# Patient Record
Sex: MALE | Race: WHITE | Employment: OTHER | URBAN - NONMETROPOLITAN AREA
[De-identification: names, ages, dates, MRNs, and addresses within clinical notes are randomized per-mention and may not be internally consistent; named-entity substitution may affect disease eponyms.]

---

## 2017-03-15 ENCOUNTER — OFFICE VISIT (OUTPATIENT)
Dept: WOUND CARE | Facility: OTHER | Age: 72
End: 2017-03-15
Attending: NURSE PRACTITIONER
Payer: COMMERCIAL

## 2017-03-15 VITALS
DIASTOLIC BLOOD PRESSURE: 67 MMHG | TEMPERATURE: 97.7 F | SYSTOLIC BLOOD PRESSURE: 126 MMHG | WEIGHT: 262.8 LBS | OXYGEN SATURATION: 97 % | HEIGHT: 68 IN | HEART RATE: 80 BPM | BODY MASS INDEX: 39.83 KG/M2 | RESPIRATION RATE: 24 BRPM

## 2017-03-15 DIAGNOSIS — E11.65 TYPE 2 DIABETES MELLITUS WITH HYPERGLYCEMIA, WITH LONG-TERM CURRENT USE OF INSULIN (H): Primary | ICD-10-CM

## 2017-03-15 DIAGNOSIS — Z79.4 TYPE 2 DIABETES MELLITUS WITH HYPERGLYCEMIA, WITH LONG-TERM CURRENT USE OF INSULIN (H): Primary | ICD-10-CM

## 2017-03-15 DIAGNOSIS — Z71.89 ACP (ADVANCE CARE PLANNING): ICD-10-CM

## 2017-03-15 LAB — HBA1C MFR BLD: 8.3 % (ref 0–5.7)

## 2017-03-15 PROCEDURE — 83036 HEMOGLOBIN GLYCOSYLATED A1C: CPT | Performed by: NURSE PRACTITIONER

## 2017-03-15 PROCEDURE — 99212 OFFICE O/P EST SF 10 MIN: CPT

## 2017-03-15 PROCEDURE — 36416 COLLJ CAPILLARY BLOOD SPEC: CPT | Performed by: NURSE PRACTITIONER

## 2017-03-15 PROCEDURE — 99213 OFFICE O/P EST LOW 20 MIN: CPT | Performed by: NURSE PRACTITIONER

## 2017-03-15 RX ORDER — ALOE VERA
GEL (GRAM) TOPICAL
Start: 2017-03-15 | End: 2018-06-09

## 2017-03-15 ASSESSMENT — PATIENT HEALTH QUESTIONNAIRE - PHQ9: 5. POOR APPETITE OR OVEREATING: NOT AT ALL

## 2017-03-15 ASSESSMENT — ANXIETY QUESTIONNAIRES
5. BEING SO RESTLESS THAT IT IS HARD TO SIT STILL: NOT AT ALL
6. BECOMING EASILY ANNOYED OR IRRITABLE: NOT AT ALL
2. NOT BEING ABLE TO STOP OR CONTROL WORRYING: NOT AT ALL
1. FEELING NERVOUS, ANXIOUS, OR ON EDGE: NOT AT ALL
GAD7 TOTAL SCORE: 0
3. WORRYING TOO MUCH ABOUT DIFFERENT THINGS: NOT AT ALL
7. FEELING AFRAID AS IF SOMETHING AWFUL MIGHT HAPPEN: NOT AT ALL

## 2017-03-15 ASSESSMENT — PAIN SCALES - GENERAL: PAINLEVEL: NO PAIN (0)

## 2017-03-15 NOTE — PATIENT INSTRUCTIONS
A1c 8.3%    Continue working on healthy eating and moving (start low and slow, work up to 30 min, 5x/week)    BG goals:  Fasting and before meals <130, >80  2 hour after eating <180    We only need 1/2 of these numbers to be within target then your A1c will be within target    Medication changes   STOP TOUJEO    1.  Starting tomorrow (3/16/17) morning, start Tresiba U-200 96 units daily    Follow up   Call me in one week.    Follow up in 3-4    Call me sooner if any problems/concerns and/or questions develop including consistent low BGs <70 or consistent high BGs >200  [phone] (Unit Coordinator)    [phone] (Nurse)

## 2017-03-15 NOTE — MR AVS SNAPSHOT
After Visit Summary   3/15/2017    Duane E Arola    MRN: [id number]           Patient Information     Date Of Birth          1945        Visit Information        Provider Department      3/15/2017 3:00 PM Dahiana Reyna NP [facility]        Today's Diagnoses     Type 2 diabetes mellitus with hyperglycemia, with long-term current use of insulin (H)    -  1    ACP (advance care planning)          Care Instructions    A1c 8.3%    Continue working on healthy eating and moving (start low and slow, work up to 30 min, 5x/week)    BG goals:  Fasting and before meals <130, >80  2 hour after eating <180    We only need 1/2 of these numbers to be within target then your A1c will be within target    Medication changes   STOP TOUJEO    1.  Starting tomorrow (3/16/17) morning, start Tresiba U-200 96 units daily    Follow up   Call me in one week.    Follow up in 3-4    Call me sooner if any problems/concerns and/or questions develop including consistent low BGs <70 or consistent high BGs >200  [phone] (Unit Coordinator)    [phone] (Nurse)          Follow-ups after your visit        Follow-up notes from your care team     Return in about 3 weeks (around 4/5/2017).      Who to contact     If you have questions or need follow up information about today's clinic visit or your schedule please contact [facility] directly at [phone].  Normal or non-critical lab and imaging results will be communicated to you by MyChart, letter or phone within 4 business days after the clinic has received the results. If you do not hear from us within 7 days, please contact the clinic through MyChart or phone. If you have a critical or abnormal lab result, we will notify you by phone as soon as possible.  Submit refill requests through [employer] or call your pharmacy and they will forward the refill request to us. Please allow 3 business days for your refill to be completed.           "Additional Information About Your Visit        MyChart Information     eWise lets you send messages to your doctor, view your test results, renew your prescriptions, schedule appointments and more. To sign up, go to [email or web address] . Click on \"Log in\" on the left side of the screen, which will take you to the Welcome page. Then click on \"Sign up Now\" on the right side of the page.     You will be asked to enter the access code listed below, as well as some personal information. Please follow the directions to create your username and password.     Your access code is: [email or web address]  Expires: 2017  3:02 PM     Your access code will  in 90 days. If you need help or a new code, please call your [facility] clinic or [phone].        Care EveryWhere ID     This is your Care EveryWhere ID. This could be used by other organizations to access your [facility] medical records  [id number]        Your Vitals Were     Pulse Temperature Respirations Height Pulse Oximetry BMI (Body Mass Index)    80 97.7  F (36.5  C) (Tympanic) 24 5' 7.75\" (1.721 m) 97% 40.25 kg/m2       Blood Pressure from Last 3 Encounters:   03/15/17 126/67   16 134/71   16 144/82    Weight from Last 3 Encounters:   03/15/17 262 lb 12.8 oz (119.2 kg)   16 264 lb 6.4 oz (119.9 kg)   16 256 lb (116.1 kg)              Today, you had the following     No orders found for display         Today's Medication Changes          These changes are accurate as of: 3/15/17  3:51 PM.  If you have any questions, ask your nurse or doctor.               Start taking these medicines.        Dose/Directions    aloe vera Gel   Used for:  Type 2 diabetes mellitus with hyperglycemia, with long-term current use of insulin (H)   Started by:  Dahiana Reyna NP        Uses it in his coffee daily   Refills:  0       insulin degludec 200 UNIT/ML pen   Commonly known as:  TRESIBA FLEXTOUCH   Used for:  Type 2 diabetes mellitus with " hyperglycemia, with long-term current use of insulin (H)   Started by:  Dahiana Reyna NP        Dose:  110 Units   Inject 110 Units Subcutaneous daily   Quantity:  15 mL   Refills:  3         Stop taking these medicines if you haven't already. Please contact your care team if you have questions.     insulin glargine 100 UNIT/ML injection   Commonly known as:  LANTUS   Stopped by:  Dahiana Reyna NP           insulin glargine U-300 300 UNIT/ML injection   Commonly known as:  TOUJEO   Stopped by:  Dahiana Reyna NP                Where to get your medicines      These medications were sent to [facility] - [place], [place] - [address]  [address], [facility] [address]     Phone:  [phone]     insulin degludec 200 UNIT/ML pen         Some of these will need a paper prescription and others can be bought over the counter.  Ask your nurse if you have questions.     You don't need a prescription for these medications     aloe vera Gel                Primary Care Provider Office Phone # Fax #    Donavan Melgar [phone]       [facility] [address] [facility] [address]        Thank you!     Thank you for choosing [facility]  for your care. Our goal is always to provide you with excellent care. Hearing back from our patients is one way we can continue to improve our services. Please take a few minutes to complete the written survey that you may receive in the mail after your visit with us. Thank you!             Your Updated Medication List - Protect others around you: Learn how to safely use, store and throw away your medicines at www.disposemymeds.org.          This list is accurate as of: 3/15/17  3:51 PM.  Always use your most recent med list.                   Brand Name Dispense Instructions for use    aloe vera Gel      Uses it in his coffee daily       ASPIRIN PO      Take 325 mg by mouth daily.       CENTRUM SILVER per  tablet      Take 1 tablet by mouth daily.       garlic 150 MG Tabs tablet      Take 150 mg by mouth daily.       Hyaluronic Acid 20-60 MG Caps      Take 1 capsule by mouth daily       insulin degludec 200 UNIT/ML pen    TRESIBA FLEXTOUCH    15 mL    Inject 110 Units Subcutaneous daily       * insulin pen needle 32G X 4 MM    BD CK U/F    200 each    Use 2 pen needles daily.       * insulin pen needle 31G X 6 MM    TOPCARE CLICKFINE PEN NEEDLES    100 each    Use pen needles daily or as directed.       Pioglitazone HCl-Glimepiride 30-2 MG Tabs      Take by mouth daily       SIMVASTATIN PO      Take 20 mg by mouth daily       VITAMIN B 12 PO      Take by mouth daily       * Notice:  This list has 2 medication(s) that are the same as other medications prescribed for you. Read the directions carefully, and ask your doctor or other care provider to review them with you.

## 2017-03-15 NOTE — PROGRESS NOTES
SUBJECTIVE:  Duane E Arola, 72 year old, male presents with the following Chief Complaint(s) with HPI to follow:  Chief Complaint   Patient presents with     Diabetes        Diabetes Follow-up      Patient is checking blood sugars: 1-2x/day.  Results:  Mornin  Midday:  (ave. 147)  Evenin    Overall B      Symptoms of hypoglycemia (low blood sugar): none    Paresthesias (numbness or burning in feet) or sores: Yes , no sores    Diabetic eye exam within the last year: due in April    Breakfast eaten regularly: Yes    Patient counting carbs: trying       HPI:  Duane's here today for the follow up regarding his Diabetes mellitus, Type 2.    Lab Results   Component Value Date    A1C 8.7 2016    A1C 7.8 2013     Current Diabetes medication:   1.  Toujeo 104 units daily  2.  Actos/Glimepiride (), 1 tablet daily  ASA use: yes, 162 mg daily  Statin use: yes, 20 mg daily    Due for his A1c.      States his insurance company told him that they won't cover Toujeo, but will cover Tresiba.      Patient Active Problem List   Diagnosis     Fibula fracture     Cellulitis of leg     Diabetes mellitus, type 2 (H)     Cellulitis     ACP (advance care planning)       History reviewed. No pertinent past medical history.    Past Surgical History   Procedure Laterality Date     Phacoemulsification with standard intraocular lens implant Right 3/22/2016     Procedure: PHACOEMULSIFICATION WITH STANDARD INTRAOCULAR LENS IMPLANT;  Surgeon: Ramses Mcnulty MD;  Location: [facility] OR       History reviewed. No pertinent family history.    Social History   Substance Use Topics     Smoking status: Never Smoker     Smokeless tobacco: Not on file     Alcohol use No       Current Outpatient Prescriptions   Medication Sig Dispense Refill     insulin glargine U-300 (TOUJEO) 300 UNIT/ML PEN Inject 100 Units Subcutaneous At Bedtime 20 mL 1     insulin pen needle (TOPCARE CLICKFINE PEN NEEDLES) 31G X 6 MM Use pen needles  "daily or as directed. 100 each 12     Cyanocobalamin (VITAMIN B 12 PO) Take by mouth daily       Pioglitazone HCl-Glimepiride 30-2 MG TABS Take by mouth daily       Hyaluronic Acid 20-60 MG CAPS Take 1 capsule by mouth daily       SIMVASTATIN PO Take 20 mg by mouth daily       insulin pen needle (BD CK U/F) 32G X 4 MM Use 2 pen needles daily. 200 each 6     ASPIRIN PO Take 325 mg by mouth daily.       garlic 150 MG TABS Take 150 mg by mouth daily.       Multiple Vitamins-Minerals (CENTRUM SILVER) per tablet Take 1 tablet by mouth daily.       [DISCONTINUED] insulin glargine (LANTUS) 100 UNIT/ML injection Inject 90 Units Subcutaneous At Bedtime Inject 50  Units AM, 50 Units PM         Allergies   Allergen Reactions     Metformin      Legs get weak     Seafood Hives       REVIEW OF SYSTEMS  Skin: negative  Eyes: positive for history of cataracts  Ears/Nose/Throat: negative  Respiratory: Cough- recent cold, No shortness of breath, No dyspnea on exertion and No hemoptysis  Cardiovascular: negative  Gastrointestinal: negative  Genitourinary: negative  Musculoskeletal: history of arthritis  Neurologic: positive for numbness or tingling of feet  Psychiatric: negative  Hematologic/Lymphatic/Immunologic: negative  Endocrine: positive for diabetes    OBJECTIVE:  /69  Pulse 80  Temp 97.7  F (36.5  C) (Tympanic)  Resp 24  Ht 5' 7.75\" (1.721 m)  Wt 262 lb 12.8 oz (119.2 kg)  SpO2 97%  BMI 40.25 kg/m2   /67  Pulse 80  Temp 97.7  F (36.5  C) (Tympanic)  Resp 24  Ht 5' 7.75\" (1.721 m)  Wt 262 lb 12.8 oz (119.2 kg)  SpO2 97%  BMI 40.25 kg/m2  Constitutional: healthy, alert and no distress  Neck: neck supple, no lymphadenopathy, trachea midline, thyroid symmetrical with out nodules noted.  Carotid arteries without bruit  Cardiovascular: RRR. No murmurs, clicks gallops or rub; +2 BLE edema--no open areas  Respiratory:  Good diaphragmatic excursion. Lungs clear  Gastrointestinal: Abdomen soft, non-tender. BS normal. "   Musculoskeletal: extremities normal- no gross deformities noted, gait normal and normal muscle tone  Skin: no suspicious lesions or rashes  Psychiatric: mentation appears normal and affect normal/bright  Hematologic/Lymphatic/Immunologic: normal ant/post cervical nodes      LABS  Results for orders placed or performed in visit on 03/15/17   Hemoglobin A1c   Result Value Ref Range    Hemoglobin A1C 8.3 %         ASSESSMENT / PLAN:  (E11.65,  Z79.4) Type 2 diabetes mellitus with hyperglycemia, with long-term current use of insulin (H)  (primary encounter diagnosis)  Comment:   Noted A1c.  Last visit for DM was almost 1 year ago.   Encouraged him to work on healthy eating and moving  Will change him to Tresiba.  Sample pens provided    Plan: insulin degludec (TRESIBA FLEXTOUCH) 200         UNIT/ML pen, aloe vera GEL, Hemoglobin A1c            (Z71.89) ACP (advance care planning)  Comment: noted  Plan: Encouraged to speak to his PCP    Patient Instructions   A1c 8.3%    Continue working on healthy eating and moving (start low and slow, work up to 30 min, 5x/week)    BG goals:  Fasting and before meals <130, >80  2 hour after eating <180    We only need 1/2 of these numbers to be within target then your A1c will be within target    Medication changes   STOP TOUJEO    1.  Starting tomorrow (3/16/17) morning, start Tresiba U-200 96 units daily    Follow up   Call me in one week.    Follow up in 3-4    Call me sooner if any problems/concerns and/or questions develop including consistent low BGs <70 or consistent high BGs >200  [phone] (Unit Coordinator)    [phone] (Nurse)      Time: 40 minutes  Barrier: none  Willingness to learn: accepting    Dahiana KAYE [facility]  Disease Management    Cc: Dr. Melgar      With the electronic record, we can now more quickly and easily track our patient diabetic goals. Our diabetes clinical review is in progress and these are the indicators we are monitoring for good diabetes  "health.     1.) HbA1C less than 7 (measurement of your average blood sugars)  2.) Blood Pressure less than 140/80  3.) LDL less than 100 (bad cholesterol)  4.) HbA1C is checked in the last 6 months and below 7% (more frequently if not at goal or adjusting medications)  5.) LDL is checked in the last 12 months (more frequently if not at goal or adjusting medications)  6.) Taking one baby aspirin daily (unless otherwise instructed)  7.) No tobacco use  8) Statin use     You have achieved 6 out of 8 of these and I am encouraging you to come in and get tuned up to achieve 8 out of 8!  Here is what you have achieved so far in my goals for you:  1.) HbA1C  less than 7:                              NO  Your last  HbA1C :   Lab Results   Component Value Date    A1C 8.3 03/15/2017    A1C 8.7 03/31/2016    A1C 7.8 07/01/2013     2.) Blood Pressure less than 140/80:       YES      Your last    BP Readings from Last 1 Encounters:       /67  Pulse 80  Temp 97.7  F (36.5  C) (Tympanic)  Resp 24  Ht 5' 7.75\" (1.721 m)  Wt 262 lb 12.8 oz (119.2 kg)  SpO2 97%  BMI 40.25 kg/m2  3.) LDL less than 100:                              YES      Your last   LDL         LDL Cholesterol Calculated   Date Value Ref Range Status   11/19/2015 70 mg/dL Final   ]  4.) Checked HbA1C in the past 6 months: YES      5.) Checked LDL in the past 12 months:    NO   6.) Taking one aspirin daily:                       YES     7.) No tobacco use:                                        YES      8.) Statin use      YES       "

## 2017-03-16 ASSESSMENT — PATIENT HEALTH QUESTIONNAIRE - PHQ9: SUM OF ALL RESPONSES TO PHQ QUESTIONS 1-9: 3

## 2017-03-16 ASSESSMENT — ANXIETY QUESTIONNAIRES: GAD7 TOTAL SCORE: 0

## 2017-03-22 ENCOUNTER — TELEPHONE (OUTPATIENT)
Dept: WOUND CARE | Facility: OTHER | Age: 72
End: 2017-03-22

## 2017-03-22 NOTE — TELEPHONE ENCOUNTER
[facility] on the patient Tresiba Flex Pen 200 units they need to verify quantity. Call [phone] and use the reference number [id number]

## 2017-03-24 ENCOUNTER — TELEPHONE (OUTPATIENT)
Dept: WOUND CARE | Facility: OTHER | Age: 72
End: 2017-03-24

## 2017-03-24 DIAGNOSIS — E11.65 TYPE 2 DIABETES MELLITUS WITH HYPERGLYCEMIA, UNSPECIFIED LONG TERM INSULIN USE STATUS: Primary | ICD-10-CM

## 2017-03-29 ENCOUNTER — TRANSFERRED RECORDS (OUTPATIENT)
Dept: HEALTH INFORMATION MANAGEMENT | Facility: HOSPITAL | Age: 72
End: 2017-03-29

## 2017-04-03 ENCOUNTER — OFFICE VISIT (OUTPATIENT)
Dept: WOUND CARE | Facility: OTHER | Age: 72
End: 2017-04-03
Attending: NURSE PRACTITIONER
Payer: COMMERCIAL

## 2017-04-03 VITALS
SYSTOLIC BLOOD PRESSURE: 124 MMHG | DIASTOLIC BLOOD PRESSURE: 62 MMHG | HEIGHT: 68 IN | WEIGHT: 266.1 LBS | BODY MASS INDEX: 40.33 KG/M2 | OXYGEN SATURATION: 95 % | HEART RATE: 72 BPM

## 2017-04-03 DIAGNOSIS — E11.65 TYPE 2 DIABETES MELLITUS WITH HYPERGLYCEMIA, UNSPECIFIED LONG TERM INSULIN USE STATUS: ICD-10-CM

## 2017-04-03 LAB
ANION GAP SERPL CALCULATED.3IONS-SCNC: 7 MMOL/L (ref 3–14)
BUN SERPL-MCNC: 27 MG/DL (ref 7–30)
CALCIUM SERPL-MCNC: 8.6 MG/DL (ref 8.5–10.1)
CHLORIDE SERPL-SCNC: 105 MMOL/L (ref 94–109)
CO2 SERPL-SCNC: 30 MMOL/L (ref 20–32)
CREAT SERPL-MCNC: 1.01 MG/DL (ref 0.66–1.25)
GFR SERPL CREATININE-BSD FRML MDRD: 72 ML/MIN/1.7M2
GLUCOSE SERPL-MCNC: 188 MG/DL (ref 70–99)
POTASSIUM SERPL-SCNC: 4 MMOL/L (ref 3.4–5.3)
SODIUM SERPL-SCNC: 142 MMOL/L (ref 133–144)

## 2017-04-03 PROCEDURE — 99212 OFFICE O/P EST SF 10 MIN: CPT

## 2017-04-03 PROCEDURE — 36415 COLL VENOUS BLD VENIPUNCTURE: CPT | Performed by: NURSE PRACTITIONER

## 2017-04-03 PROCEDURE — 99213 OFFICE O/P EST LOW 20 MIN: CPT | Performed by: NURSE PRACTITIONER

## 2017-04-03 PROCEDURE — 80048 BASIC METABOLIC PNL TOTAL CA: CPT | Performed by: NURSE PRACTITIONER

## 2017-04-03 ASSESSMENT — PAIN SCALES - GENERAL: PAINLEVEL: NO PAIN (0)

## 2017-04-03 NOTE — PATIENT INSTRUCTIONS
A1c 8.3%    Continue working on healthy eating and moving (start low and slow, work up to 30 min, 5x/week)    BG goals:  Fasting and before meals <130, >80  2 hour after eating <180    We only need 1/2 of these numbers to be within target then your A1c will be within target    Medication changes   1.  Tresiba U-200, 100 units daily    Follow up   Follow up in June for     Call me sooner if any problems/concerns and/or questions develop including consistent low BGs <70 or consistent high BGs >200  [phone] (Unit Coordinator)    [phone] (Nurse)    Check your kidney function today.    If it's good, we will start you on Januvia (more specific than Duetact).

## 2017-04-03 NOTE — PROGRESS NOTES
SUBJECTIVE:  Duane E Arola, 72 year old, male presents with the following Chief Complaint(s) with HPI to follow:  Chief Complaint   Patient presents with     Diabetes     follow up        Diabetes Follow-up      Patient is checking blood sugars: 1-2x/day.  Results:  Mornin-238 (ave. 160)  Midday:  (ave. 162)  Evenin-269 (ave. 159)    Overall B      Symptoms of hypoglycemia (low blood sugar): none    Paresthesias (numbness or burning in feet) or sores: no, no sores    Diabetic eye exam within the last year: yesl    Breakfast eaten regularly: Yes    Patient counting carbs: trying    Exercising: daily exercising        HPI:  Duane's here today for the follow up regarding his Diabetes mellitus, Type 2.    Lab Results   Component Value Date    A1C 8.7 2016    A1C 7.8 2013     Current Diabetes medication:   1.  Tresiba 98 units daily  2.  Actos/Glimepiride (), 1 tablet daily  ASA use: yes, 162 mg daily  Statin use: yes, 20 mg daily  Denies any complaints.      Recently saw the eye doctor.    He received a letter from his insurance about the possible side effects that can occur with his Duetact.    Duane doesn't weight himself daily.    Denies any changed with BLE edema or SOB with activity.   Denies any CP.      Patient Active Problem List   Diagnosis     Fibula fracture     Cellulitis of leg     Diabetes mellitus, type 2 (H)     Cellulitis     ACP (advance care planning)       History reviewed. No pertinent past medical history.    Past Surgical History:   Procedure Laterality Date     COLONOSCOPY - HIM SCAN  2012    [facility]     PHACOEMULSIFICATION WITH STANDARD INTRAOCULAR LENS IMPLANT Right 3/22/2016    Procedure: PHACOEMULSIFICATION WITH STANDARD INTRAOCULAR LENS IMPLANT;  Surgeon: Ramses Mcnulty MD;  Location: [facility]       History reviewed. No pertinent family history.    Social History   Substance Use Topics     Smoking status: Never Smoker     Smokeless tobacco: Not on file  "    Alcohol use No       Current Outpatient Prescriptions   Medication Sig Dispense Refill     insulin degludec (TRESIBA FLEXTOUCH) 200 UNIT/ML pen Inject 110 Units Subcutaneous daily 15 mL 3     aloe vera GEL Uses it in his coffee daily       insulin pen needle (TOPCARE CLICKFINE PEN NEEDLES) 31G X 6 MM Use pen needles daily or as directed. 100 each 12     Cyanocobalamin (VITAMIN B 12 PO) Take by mouth daily       Pioglitazone HCl-Glimepiride 30-2 MG TABS Take by mouth daily       Hyaluronic Acid 20-60 MG CAPS Take 1 capsule by mouth daily       SIMVASTATIN PO Take 20 mg by mouth daily       insulin pen needle (BD CK U/F) 32G X 4 MM Use 2 pen needles daily. 200 each 6     ASPIRIN PO Take 325 mg by mouth daily.       garlic 150 MG TABS Take 150 mg by mouth daily.       Multiple Vitamins-Minerals (CENTRUM SILVER) per tablet Take 1 tablet by mouth daily.         Allergies   Allergen Reactions     Metformin      Legs get weak     Seafood Hives       REVIEW OF SYSTEMS  Skin: negative  Eyes: positive for history of cataracts (right eye)  Ears/Nose/Throat: negative  Respiratory: Cough- gone, No shortness of breath, No dyspnea on exertion and No hemoptysis  Cardiovascular: negative  Gastrointestinal: negative  Genitourinary: negative  Musculoskeletal: history of arthritis; history of right shoulder surgery  Neurologic: negative   Psychiatric: negative  Hematologic/Lymphatic/Immunologic: negative  Endocrine: positive for diabetes    OBJECTIVE:  /62 (BP Location: Right arm, Patient Position: Chair, Cuff Size: Adult Large)  Pulse 72  Ht 5' 7.75\" (1.721 m)  Wt 266 lb 1.6 oz (120.7 kg)  SpO2 95%  BMI 40.76 kg/m2   Constitutional: healthy, alert and no distress  Neck: neck supple, no lymphadenopathy, trachea midline, thyroid symmetrical with out nodules noted.  Carotid arteries without bruit  Cardiovascular: RRR. No murmurs, clicks gallops or rub; +3 RLE; +2 LLE edema--no open areas  Respiratory:  Good diaphragmatic " excursion. Lungs clear  Gastrointestinal: Abdomen soft, non-tender. BS normal.   Musculoskeletal: extremities normal- no gross deformities noted, gait normal and normal muscle tone  Skin: no suspicious lesions or rashes  Psychiatric: mentation appears normal and affect normal/bright  Hematologic/Lymphatic/Immunologic: normal ant/post cervical nodes      LABS  Results for orders placed or performed in visit on 03/15/17   Hemoglobin A1c   Result Value Ref Range    Hemoglobin A1C 8.3 %     Results for orders placed or performed in visit on 04/03/17   Basic metabolic panel   Result Value Ref Range    Sodium 142 133 - 144 mmol/L    Potassium 4.0 3.4 - 5.3 mmol/L    Chloride 105 94 - 109 mmol/L    Carbon Dioxide 30 20 - 32 mmol/L    Anion Gap 7 3 - 14 mmol/L    Glucose 188 (H) 70 - 99 mg/dL    Urea Nitrogen 27 7 - 30 mg/dL    Creatinine 1.01 0.66 - 1.25 mg/dL    GFR Estimate 72 >60 mL/min/1.7m2    GFR Estimate If Black 87 >60 mL/min/1.7m2    Calcium 8.6 8.5 - 10.1 mg/dL           ASSESSMENT / PLAN:  (E11.65) Type 2 diabetes mellitus with hyperglycemia, unspecified long term insulin use status (H)  Comment:   Well, his BGs are still variable,     Plan: Basic metabolic panel          I would like to transition him to Januvia or Invokana.    Duane has had UTIs in the past (Invokana might not be the best option for him).    If Januvia doesn't help with his BGs, Victoza might be an option.  He doesn't recall a history of pancreatitis or thyroid cancer.      Patient Instructions   A1c 8.3%    Continue working on healthy eating and moving (start low and slow, work up to 30 min, 5x/week)    BG goals:  Fasting and before meals <130, >80  2 hour after eating <180    We only need 1/2 of these numbers to be within target then your A1c will be within target    Medication changes   1.  Tresiba U-200, 100 units daily    Follow up   Follow up in June for     Call me sooner if any problems/concerns and/or questions develop including  "consistent low BGs <70 or consistent high BGs >200  [phone] (Unit Coordinator)    [phone] (Nurse)    Check your kidney function today.    If it's good, we will start you on Januvia (more specific than Duetact).        Time: 40 minutes  Barrier: none  Willingness to learn: accepting    Dahiana KAYE [facility]  Disease Management    Cc: Dr. Melgar      With the electronic record, we can now more quickly and easily track our patient diabetic goals. Our diabetes clinical review is in progress and these are the indicators we are monitoring for good diabetes health.     1.) HbA1C less than 7 (measurement of your average blood sugars)  2.) Blood Pressure less than 140/80  3.) LDL less than 100 (bad cholesterol)  4.) HbA1C is checked in the last 6 months and below 7% (more frequently if not at goal or adjusting medications)  5.) LDL is checked in the last 12 months (more frequently if not at goal or adjusting medications)  6.) Taking one baby aspirin daily (unless otherwise instructed)  7.) No tobacco use  8) Statin use     You have achieved 7 out of 8 of these and I am encouraging you to come in and get tuned up to achieve 8 out of 8!  Here is what you have achieved so far in my goals for you:  1.) HbA1C  less than 7:                              NO  Your last  HbA1C :   Lab Results   Component Value Date    A1C 8.3 03/15/2017    A1C 8.7 03/31/2016    A1C 7.8 07/01/2013     2.) Blood Pressure less than 140/80:       YES      Your last    BP Readings from Last 1 Encounters:       /62 (BP Location: Right arm, Patient Position: Chair, Cuff Size: Adult Large)  Pulse 72  Ht 5' 7.75\" (1.721 m)  Wt 266 lb 1.6 oz (120.7 kg)  SpO2 95%  BMI 40.76 kg/m2  3.) LDL less than 100:                              YES      Your last   LDL         LDL Cholesterol Calculated   Date Value Ref Range Status   12/08/2016 69 <100 mg/dL Final   ]  4.) Checked HbA1C in the past 6 months: YES      5.) Checked LDL in the past 12 " months:    YES   6.) Taking one aspirin daily:                       YES     7.) No tobacco use:                                        YES      8.) Statin use      YES

## 2017-04-03 NOTE — MR AVS SNAPSHOT
After Visit Summary   4/3/2017    Duane E Arola    MRN: [id number]           Patient Information     Date Of Birth          1945        Visit Information        Provider Department      4/3/2017 10:00 AM Dahiana Reyna NP [facility]        Today's Diagnoses     Type 2 diabetes mellitus with hyperglycemia, unspecified long term insulin use status (H)          Care Instructions    A1c 8.3%    Continue working on healthy eating and moving (start low and slow, work up to 30 min, 5x/week)    BG goals:  Fasting and before meals <130, >80  2 hour after eating <180    We only need 1/2 of these numbers to be within target then your A1c will be within target    Medication changes   1.  Tresiba U-200, 100 units daily    Follow up   Follow up in June for     Call me sooner if any problems/concerns and/or questions develop including consistent low BGs <70 or consistent high BGs >200  [phone] (Unit Coordinator)    [phone] (Nurse)    Check your kidney function today.    If it's good, we will start you on Januvia (more specific than Duetact).          Follow-ups after your visit        Who to contact     If you have questions or need follow up information about today's clinic visit or your schedule please contact [facility] directly at [phone].  Normal or non-critical lab and imaging results will be communicated to you by MyChart, letter or phone within 4 business days after the clinic has received the results. If you do not hear from us within 7 days, please contact the clinic through [employer] or phone. If you have a critical or abnormal lab result, we will notify you by phone as soon as possible.  Submit refill requests through [employer] or call your pharmacy and they will forward the refill request to us. Please allow 3 business days for your refill to be completed.          Additional Information About Your Visit        [employer] Information     [employer] lets you send  "messages to your doctor, view your test results, renew your prescriptions, schedule appointments and more. To sign up, go to [email or web address] . Click on \"Log in\" on the left side of the screen, which will take you to the Welcome page. Then click on \"Sign up Now\" on the right side of the page.     You will be asked to enter the access code listed below, as well as some personal information. Please follow the directions to create your username and password.     Your access code is: [email or web address]  Expires: 2017  3:02 PM     Your access code will  in 90 days. If you need help or a new code, please call your [place] clinic or [phone].        Care EveryWhere ID     This is your Care EveryWhere ID. This could be used by other organizations to access your [place] medical records  [id number]        Your Vitals Were     Pulse Height Pulse Oximetry BMI (Body Mass Index)          72 5' 7.75\" (1.721 m) 95% 40.76 kg/m2         Blood Pressure from Last 3 Encounters:   17 124/62   03/15/17 126/67   16 134/71    Weight from Last 3 Encounters:   17 266 lb 1.6 oz (120.7 kg)   03/15/17 262 lb 12.8 oz (119.2 kg)   16 264 lb 6.4 oz (119.9 kg)              We Performed the Following     Basic metabolic panel        Primary Care Provider Office Phone # Fax #    Donavan Melgar [phone]       [facility] [address] [facility] [address]        Thank you!     Thank you for choosing [facility]  for your care. Our goal is always to provide you with excellent care. Hearing back from our patients is one way we can continue to improve our services. Please take a few minutes to complete the written survey that you may receive in the mail after your visit with us. Thank you!             Your Updated Medication List - Protect others around you: Learn how to safely use, store and throw away your medicines at www.disposemymeds.org.          This list is " accurate as of: 4/3/17 10:44 AM.  Always use your most recent med list.                   Brand Name Dispense Instructions for use    aloe vera Gel      Uses it in his coffee daily       ASPIRIN PO      Take 325 mg by mouth daily.       CENTRUM SILVER per tablet      Take 1 tablet by mouth daily.       garlic 150 MG Tabs tablet      Take 150 mg by mouth daily.       Hyaluronic Acid 20-60 MG Caps      Take 1 capsule by mouth daily       insulin degludec 200 UNIT/ML pen    TRESIBA FLEXTOUCH    15 mL    Inject 110 Units Subcutaneous daily       * insulin pen needle 32G X 4 MM    BD CK U/F    200 each    Use 2 pen needles daily.       * insulin pen needle 31G X 6 MM    TOPCARE CLICKFINE PEN NEEDLES    100 each    Use pen needles daily or as directed.       Pioglitazone HCl-Glimepiride 30-2 MG Tabs      Take by mouth daily       SIMVASTATIN PO      Take 20 mg by mouth daily       VITAMIN B 12 PO      Take by mouth daily       * Notice:  This list has 2 medication(s) that are the same as other medications prescribed for you. Read the directions carefully, and ask your doctor or other care provider to review them with you.

## 2017-04-03 NOTE — PROGRESS NOTES
"Chief Complaint   Patient presents with     Diabetes     follow up       Initial /62 (BP Location: Right arm, Patient Position: Chair, Cuff Size: Adult Large)  Pulse 72  Ht 5' 7.75\" (1.721 m)  Wt 266 lb 1.6 oz (120.7 kg)  SpO2 95%  BMI 40.76 kg/m2 Estimated body mass index is 40.76 kg/(m^2) as calculated from the following:    Height as of this encounter: 5' 7.75\" (1.721 m).    Weight as of this encounter: 266 lb 1.6 oz (120.7 kg).  Medication Reconciliation: complete   Selene Rivas      "

## 2017-04-04 ENCOUNTER — TELEPHONE (OUTPATIENT)
Dept: WOUND CARE | Facility: OTHER | Age: 72
End: 2017-04-04

## 2017-04-04 NOTE — TELEPHONE ENCOUNTER
Duane called.      He has a BG of 66 yesterday.      Didn't have a delayed meal, but didn't eat as much as he typically does.      He has an appointment with Dr. Melgar tomorrow (?).      I would like to transition him to Januvia as compared to Duetact.       Told him to keep his insulin dose the same.      Sent note to Dr. Talia Reyna RN FNP-BC  Disease Management

## 2017-04-04 NOTE — TELEPHONE ENCOUNTER
Pt called concerned that his BS was 66 after he ate. He was going to raise his medication intake, but after the reading he did not.   Would like a call back about these concerns.   Let me know if you want me to do    Called Duane regarding the above information.      He has an appointment with Dr. Melgar.      Told him

## 2017-05-19 LAB
ALT SERPL-CCNC: 45 U/L (ref 18–65)
AST SERPL-CCNC: 48 U/L (ref 45–122)
CHOLEST SERPL-MCNC: 121 MG/DL
CREAT SERPL-MCNC: 1.03 MG/DL (ref 0.8–1.5)
GLUCOSE SERPL-MCNC: 170 MG/DL (ref 60–99)
HDLC SERPL-MCNC: 38 MG/DL
LDLC SERPL CALC-MCNC: 56 MG/DL
POTASSIUM SERPL-SCNC: 4.2 MEQ/L (ref 3.5–5.1)
TRIGL SERPL-MCNC: 135 MG/DL

## 2017-09-21 ENCOUNTER — TRANSFERRED RECORDS (OUTPATIENT)
Dept: HEALTH INFORMATION MANAGEMENT | Facility: HOSPITAL | Age: 72
End: 2017-09-21

## 2017-10-04 ENCOUNTER — TRANSFERRED RECORDS (OUTPATIENT)
Dept: HEALTH INFORMATION MANAGEMENT | Facility: HOSPITAL | Age: 72
End: 2017-10-04

## 2017-10-04 LAB
ALT SERPL-CCNC: 35 U/L (ref 18–65)
AST SERPL-CCNC: 25 U/L (ref 10–40)
CHOLEST SERPL-MCNC: 108 MG/DL
CREAT SERPL-MCNC: 1 MG/DL (ref 0.8–1.5)
GLUCOSE SERPL-MCNC: 129 MG/DL (ref 60–99)
HBA1C MFR BLD: 9.3 % (ref 4–6)
HDLC SERPL-MCNC: 38 MG/DL
LDLC SERPL CALC-MCNC: 49 MG/DL
POTASSIUM SERPL-SCNC: 4.3 MEQ/L (ref 3.5–5.1)
TRIGL SERPL-MCNC: 105 MG/DL
TSH SERPL-ACNC: 1.71 MIU/ML (ref 0.35–4.8)

## 2017-11-02 ENCOUNTER — ANESTHESIA (OUTPATIENT)
Dept: SURGERY | Facility: HOSPITAL | Age: 72
End: 2017-11-02
Payer: COMMERCIAL

## 2017-11-02 ENCOUNTER — ANESTHESIA EVENT (OUTPATIENT)
Dept: SURGERY | Facility: HOSPITAL | Age: 72
End: 2017-11-02
Payer: COMMERCIAL

## 2017-11-02 ENCOUNTER — HOSPITAL ENCOUNTER (OUTPATIENT)
Facility: HOSPITAL | Age: 72
Discharge: HOME | End: 2017-11-02
Attending: OPHTHALMOLOGY | Admitting: OPHTHALMOLOGY
Payer: COMMERCIAL

## 2017-11-02 ENCOUNTER — SURGERY (OUTPATIENT)
Age: 72
End: 2017-11-02

## 2017-11-02 VITALS
RESPIRATION RATE: 16 BRPM | OXYGEN SATURATION: 97 % | SYSTOLIC BLOOD PRESSURE: 155 MMHG | DIASTOLIC BLOOD PRESSURE: 71 MMHG | TEMPERATURE: 98.7 F

## 2017-11-02 PROCEDURE — 27210794 ZZH OR GENERAL SUPPLY STERILE: Performed by: OPHTHALMOLOGY

## 2017-11-02 PROCEDURE — 40000305 ZZH STATISTIC PRE PROC ASSESS I: Performed by: OPHTHALMOLOGY

## 2017-11-02 PROCEDURE — C9447 INJ, PHENYLEPHRINE KETOROLAC: HCPCS | Performed by: OPHTHALMOLOGY

## 2017-11-02 PROCEDURE — 99100 ANES PT EXTEME AGE<1 YR&>70: CPT | Performed by: NURSE ANESTHETIST, CERTIFIED REGISTERED

## 2017-11-02 PROCEDURE — 37000008 ZZH ANESTHESIA TECHNICAL FEE, 1ST 30 MIN: Performed by: OPHTHALMOLOGY

## 2017-11-02 PROCEDURE — 25000125 ZZHC RX 250: Performed by: OPHTHALMOLOGY

## 2017-11-02 PROCEDURE — 36000056 ZZH SURGERY LEVEL 3 1ST 30 MIN: Performed by: OPHTHALMOLOGY

## 2017-11-02 PROCEDURE — 37000009 ZZH ANESTHESIA TECHNICAL FEE, EACH ADDTL 15 MIN: Performed by: OPHTHALMOLOGY

## 2017-11-02 PROCEDURE — V2632 POST CHMBR INTRAOCULAR LENS: HCPCS | Performed by: OPHTHALMOLOGY

## 2017-11-02 PROCEDURE — 66984 XCAPSL CTRC RMVL W/O ECP: CPT | Performed by: ANESTHESIOLOGY

## 2017-11-02 PROCEDURE — 25000128 H RX IP 250 OP 636: Performed by: OPHTHALMOLOGY

## 2017-11-02 PROCEDURE — 71000027 ZZH RECOVERY PHASE 2 EACH 15 MINS: Performed by: OPHTHALMOLOGY

## 2017-11-02 PROCEDURE — 66984 XCAPSL CTRC RMVL W/O ECP: CPT | Performed by: NURSE ANESTHETIST, CERTIFIED REGISTERED

## 2017-11-02 DEVICE — LENS-TECNIS PCB00 21.0 PRELOADED: Type: IMPLANTABLE DEVICE | Site: EYE | Status: FUNCTIONAL

## 2017-11-02 RX ORDER — DEXAMETHASONE SODIUM PHOSPHATE 4 MG/ML
4 INJECTION, SOLUTION INTRA-ARTICULAR; INTRALESIONAL; INTRAMUSCULAR; INTRAVENOUS; SOFT TISSUE EVERY 10 MIN PRN
Status: DISCONTINUED | OUTPATIENT
Start: 2017-11-02 | End: 2017-11-02 | Stop reason: HOSPADM

## 2017-11-02 RX ORDER — SODIUM CHLORIDE, SODIUM LACTATE, POTASSIUM CHLORIDE, CALCIUM CHLORIDE 600; 310; 30; 20 MG/100ML; MG/100ML; MG/100ML; MG/100ML
INJECTION, SOLUTION INTRAVENOUS CONTINUOUS
Status: DISCONTINUED | OUTPATIENT
Start: 2017-11-02 | End: 2017-11-02 | Stop reason: HOSPADM

## 2017-11-02 RX ORDER — PHENYLEPHRINE HYDROCHLORIDE 25 MG/ML
1 SOLUTION/ DROPS OPHTHALMIC
Status: COMPLETED | OUTPATIENT
Start: 2017-11-02 | End: 2017-11-02

## 2017-11-02 RX ORDER — ONDANSETRON 2 MG/ML
4 INJECTION INTRAMUSCULAR; INTRAVENOUS EVERY 30 MIN PRN
Status: DISCONTINUED | OUTPATIENT
Start: 2017-11-02 | End: 2017-11-02 | Stop reason: HOSPADM

## 2017-11-02 RX ORDER — HYDRALAZINE HYDROCHLORIDE 20 MG/ML
2.5-5 INJECTION INTRAMUSCULAR; INTRAVENOUS EVERY 10 MIN PRN
Status: DISCONTINUED | OUTPATIENT
Start: 2017-11-02 | End: 2017-11-02 | Stop reason: HOSPADM

## 2017-11-02 RX ORDER — MEPERIDINE HYDROCHLORIDE 25 MG/ML
12.5 INJECTION INTRAMUSCULAR; INTRAVENOUS; SUBCUTANEOUS
Status: DISCONTINUED | OUTPATIENT
Start: 2017-11-02 | End: 2017-11-02 | Stop reason: HOSPADM

## 2017-11-02 RX ORDER — FENTANYL CITRATE 50 UG/ML
25-50 INJECTION, SOLUTION INTRAMUSCULAR; INTRAVENOUS
Status: DISCONTINUED | OUTPATIENT
Start: 2017-11-02 | End: 2017-11-02 | Stop reason: HOSPADM

## 2017-11-02 RX ORDER — ALBUTEROL SULFATE 0.83 MG/ML
2.5 SOLUTION RESPIRATORY (INHALATION) EVERY 4 HOURS PRN
Status: DISCONTINUED | OUTPATIENT
Start: 2017-11-02 | End: 2017-11-02 | Stop reason: HOSPADM

## 2017-11-02 RX ORDER — TETRACAINE HYDROCHLORIDE 5 MG/ML
SOLUTION OPHTHALMIC PRN
Status: DISCONTINUED | OUTPATIENT
Start: 2017-11-02 | End: 2017-11-02 | Stop reason: HOSPADM

## 2017-11-02 RX ORDER — CYCLOPENTOLATE HYDROCHLORIDE 10 MG/ML
1 SOLUTION/ DROPS OPHTHALMIC
Status: COMPLETED | OUTPATIENT
Start: 2017-11-02 | End: 2017-11-02

## 2017-11-02 RX ORDER — NALOXONE HYDROCHLORIDE 0.4 MG/ML
.1-.4 INJECTION, SOLUTION INTRAMUSCULAR; INTRAVENOUS; SUBCUTANEOUS
Status: DISCONTINUED | OUTPATIENT
Start: 2017-11-02 | End: 2017-11-02 | Stop reason: HOSPADM

## 2017-11-02 RX ORDER — PROMETHAZINE HYDROCHLORIDE 25 MG/ML
12.5 INJECTION, SOLUTION INTRAMUSCULAR; INTRAVENOUS
Status: DISCONTINUED | OUTPATIENT
Start: 2017-11-02 | End: 2017-11-02 | Stop reason: HOSPADM

## 2017-11-02 RX ORDER — PREDNISOLONE ACETATE 10 MG/ML
SUSPENSION/ DROPS OPHTHALMIC PRN
Status: DISCONTINUED | OUTPATIENT
Start: 2017-11-02 | End: 2017-11-02 | Stop reason: HOSPADM

## 2017-11-02 RX ORDER — ONDANSETRON 4 MG/1
4 TABLET, ORALLY DISINTEGRATING ORAL EVERY 30 MIN PRN
Status: DISCONTINUED | OUTPATIENT
Start: 2017-11-02 | End: 2017-11-02 | Stop reason: HOSPADM

## 2017-11-02 RX ORDER — OFLOXACIN 3 MG/ML
1 SOLUTION/ DROPS OPHTHALMIC
Status: COMPLETED | OUTPATIENT
Start: 2017-11-02 | End: 2017-11-02

## 2017-11-02 RX ORDER — PROPARACAINE HYDROCHLORIDE 5 MG/ML
1 SOLUTION/ DROPS OPHTHALMIC ONCE
Status: COMPLETED | OUTPATIENT
Start: 2017-11-02 | End: 2017-11-02

## 2017-11-02 RX ORDER — OFLOXACIN 3 MG/ML
SOLUTION/ DROPS OPHTHALMIC PRN
Status: DISCONTINUED | OUTPATIENT
Start: 2017-11-02 | End: 2017-11-02 | Stop reason: HOSPADM

## 2017-11-02 RX ADMIN — DICLOFENAC SODIUM 0.5 ML: 1 SOLUTION OPHTHALMIC at 09:33

## 2017-11-02 RX ADMIN — PHENYLEPHRINE AND KETOROLAC 250 ML GIVEN: 10.16; 2.88 INJECTION, SOLUTION, CONCENTRATE INTRAOCULAR at 11:38

## 2017-11-02 RX ADMIN — OFLOXACIN 1 DROP: 3 SOLUTION/ DROPS OPHTHALMIC at 11:38

## 2017-11-02 RX ADMIN — PHENYLEPHRINE HYDROCHLORIDE 2 ML: 25 SOLUTION/ DROPS OPHTHALMIC at 11:38

## 2017-11-02 RX ADMIN — PHENYLEPHRINE HYDROCHLORIDE 1 DROP: 25 SOLUTION/ DROPS OPHTHALMIC at 09:20

## 2017-11-02 RX ADMIN — OFLOXACIN 1 DROP: 3 SOLUTION/ DROPS OPHTHALMIC at 09:20

## 2017-11-02 RX ADMIN — Medication 0.5 ML: at 11:40

## 2017-11-02 RX ADMIN — PHENYLEPHRINE HYDROCHLORIDE 1 DROP: 25 SOLUTION/ DROPS OPHTHALMIC at 09:32

## 2017-11-02 RX ADMIN — TETRACAINE HYDROCHLORIDE 1 DROP: 5 SOLUTION OPHTHALMIC at 11:39

## 2017-11-02 RX ADMIN — CYCLOPENTOLATE HYDROCHLORIDE 1 DROP: 10 SOLUTION/ DROPS OPHTHALMIC at 09:32

## 2017-11-02 RX ADMIN — PREDNISOLONE ACETATE 1 DROP: 10 SUSPENSION/ DROPS OPHTHALMIC at 11:39

## 2017-11-02 RX ADMIN — CYCLOPENTOLATE HYDROCHLORIDE 1 DROP: 10 SOLUTION/ DROPS OPHTHALMIC at 09:20

## 2017-11-02 RX ADMIN — PROPARACAINE HYDROCHLORIDE 1 DROP: 5 SOLUTION/ DROPS OPHTHALMIC at 09:19

## 2017-11-02 RX ADMIN — Medication 0.8 ML: at 11:39

## 2017-11-02 ASSESSMENT — LIFESTYLE VARIABLES: TOBACCO_USE: 1

## 2017-11-02 NOTE — IP AVS SNAPSHOT
MRN:[id number]                      After Visit Summary   11/2/2017    Duane E Arola    MRN: [id number]           Thank you!     Thank you for choosing [facility] for your care. Our goal is always to provide you with excellent care. Hearing back from our patients is one way we can continue to improve our services. Please take a few minutes to complete the written survey that you may receive in the mail after you visit with us. Thank you!        Patient Information     Date Of Birth          1945        About your hospital stay     You were admitted on:  November 2, 2017 You last received care in the:  [facility] Preop/Phase II    You were discharged on:  November 2, 2017       Who to Call     For medical emergencies, please call 911.  For non-urgent questions about your medical care, please call your primary care provider or clinic, [phone]  For questions related to your surgery, please call your surgery clinic        Attending Provider     Provider Severiano Patel MD Ophthalmology       Primary Care Provider Office Phone # Fax #    Donavan Melgar [phone]      Further instructions from your care team       AFTER CATARACT SURGERY RESTRICTIONS  SHIELD: WEAR OVER SURGICAL EYE AT NIGHT FOR 1 WEEK  ACTIVITY/LIFTING: Avoid activities, which increase abdominal/head pressure such as pulling on a starter cord, heavy lifting (over 15-20 lbs) or bending with the head below the waist, for 1 week. Avoid sudden jerky movements (chopping, shoveling) for 1 week. Waking and lower body exercise such as biking is okay.   WATER: Showering/washing hair is okay the day after surgery, but avoid excess water, soap, or shampoo in your eyes. Clean eyelids gently with a clean, wet washcloth as needed. Avoid pressure on the eye.   SUNLIGHT: If the eye is light sensitive after surgery, dark glasses may be worn.   DIET/MEDICATIONS: Resume you usual diet and medications your family doctor  "ehas prescribed. Continue to use/follow the instructions for your eye drops.   DRIVING: Avoid driving with a patch. Resume driving when you feel safe, but don't drive on the day of surgery.  PAIN: Minor pain and itching is normal during healing. DO NOT RUB THE EYE! Report any unusual swelling, increased discharge or pain that is not relieved by Tylenol (or equivalent). If sudden drop/change in vision call immediately. [facility] [phone]  CONTINUE TO USE ALL THE EYE DROPS PER THE INSTRUCTIONS ORDERED BY DR. WILKERSON'S TECHNICIANS  FOR EMERGENCY DR. MARTIN: [phone]  FOLLOW EYE DROP INSTRUCTIONS GIVEN BY 's OFFICE    Pending Results     No orders found from 10/31/2017 to 11/3/2017.            Admission Information     Date & Time Provider Department Dept. Phone    2017 Severiano Martin MD [facility] Preop/Phase [phone]      Your Vitals Were     Blood Pressure Temperature Respirations Pulse Oximetry          140/75 98.7  F (37.1  C) (Oral) 16 97%        MyChart Information     [employer] lets you send messages to your doctor, view your test results, renew your prescriptions, schedule appointments and more. To sign up, go to [email or web address] . Click on \"Log in\" on the left side of the screen, which will take you to the Welcome page. Then click on \"Sign up Now\" on the right side of the page.     You will be asked to enter the access code listed below, as well as some personal information. Please follow the directions to create your username and password.     Your access code is: [email or web address]  Expires: 2018 12:02 PM     Your access code will  in 90 days. If you need help or a new code, please call your [place] clinic or [phone].        Care EveryWhere ID     This is your Care EveryWhere ID. This could be used by other organizations to access your [place] medical records  [id number]        Equal Access to Services     MATT RIOS AH: kyra Kennedy " esdras maynor roxannelucila helm, nithin strangeaaandrea ah. So [facility] [phone].    ATENCIÓN: Si shayy lee, tiene a bell disposición servicios gratuitos de asistencia lingüística. Llame al [phone].    We comply with applicable federal civil rights laws and [place] laws. We do not discriminate on the basis of race, color, national origin, age, disability, sex, sexual orientation, or gender identity.               Review of your medicines      CONTINUE these medicines which may have CHANGED, or have new prescriptions. If we are uncertain of the size of tablets/capsules you have at home, strength may be listed as something that might have changed.        Dose / Directions    insulin degludec 200 UNIT/ML pen   Commonly known as:  TRESIBA FLEXTOUCH   This may have changed:  how much to take   Used for:  Type 2 diabetes mellitus with hyperglycemia, with long-term current use of insulin (H)        Dose:  110 Units   Inject 110 Units Subcutaneous daily   Quantity:  15 mL   Refills:  3         CONTINUE these medicines which have NOT CHANGED        Dose / Directions    aloe vera Gel   Used for:  Type 2 diabetes mellitus with hyperglycemia, with long-term current use of insulin (H)        Uses it in his coffee daily   Refills:  0       ASPIRIN PO        Dose:  162 mg   Take 162 mg by mouth daily   Refills:  0       CENTRUM SILVER per tablet        Dose:  1 tablet   Take 1 tablet by mouth daily.   Refills:  0       garlic 150 MG Tabs tablet        Dose:  150 mg   Take 150 mg by mouth daily.   Refills:  0       GLIMEPIRIDE PO        Refills:  0       * insulin pen needle 32G X 4 MM   Commonly known as:  BD CK U/F   Used for:  Diabetes mellitus, type 2 (H)        Use 2 pen needles daily.   Quantity:  200 each   Refills:  6       * insulin pen needle 31G X 6 MM   Commonly known as:  TOPCARE CLICKFINE PEN NEEDLES   Used for:  Type 2 diabetes mellitus with hyperglycemia (H)        Use pen needles  daily or as directed.   Quantity:  100 each   Refills:  12       PIOGLITAZONE HCL PO        Refills:  0       SIMVASTATIN PO        Dose:  20 mg   Take 20 mg by mouth daily   Refills:  0       * Notice:  This list has 2 medication(s) that are the same as other medications prescribed for you. Read the directions carefully, and ask your doctor or other care provider to review them with you.             Protect others around you: Learn how to safely use, store and throw away your medicines at www.disposemymeds.org.             Medication List: This is a list of all your medications and when to take them. Check marks below indicate your daily home schedule. Keep this list as a reference.      Medications           Morning Afternoon Evening Bedtime As Needed    aloe vera Gel   Uses it in his coffee daily                                ASPIRIN PO   Take 162 mg by mouth daily                                CENTRUM SILVER per tablet   Take 1 tablet by mouth daily.                                garlic 150 MG Tabs tablet   Take 150 mg by mouth daily.                                GLIMEPIRIDE PO                                insulin degludec 200 UNIT/ML pen   Commonly known as:  TRESIBA FLEXTOUCH   Inject 110 Units Subcutaneous daily                                * insulin pen needle 32G X 4 MM   Commonly known as:  BD CK U/F   Use 2 pen needles daily.                                * insulin pen needle 31G X 6 MM   Commonly known as:  TOPCARE CLICKFINE PEN NEEDLES   Use pen needles daily or as directed.                                PIOGLITAZONE HCL PO                                SIMVASTATIN PO   Take 20 mg by mouth daily                                * Notice:  This list has 2 medication(s) that are the same as other medications prescribed for you. Read the directions carefully, and ask your doctor or other care provider to review them with you.

## 2017-11-02 NOTE — DISCHARGE INSTRUCTIONS
AFTER CATARACT SURGERY RESTRICTIONS  SHIELD: WEAR OVER SURGICAL EYE AT NIGHT FOR 1 WEEK  ACTIVITY/LIFTING: Avoid activities, which increase abdominal/head pressure such as pulling on a starter cord, heavy lifting (over 15-20 lbs) or bending with the head below the waist, for 1 week. Avoid sudden jerky movements (chopping, shoveling) for 1 week. Waking and lower body exercise such as biking is okay.   WATER: Showering/washing hair is okay the day after surgery, but avoid excess water, soap, or shampoo in your eyes. Clean eyelids gently with a clean, wet washcloth as needed. Avoid pressure on the eye.   SUNLIGHT: If the eye is light sensitive after surgery, dark glasses may be worn.   DIET/MEDICATIONS: Resume you usual diet and medications your family doctor ehas prescribed. Continue to use/follow the instructions for your eye drops.   DRIVING: Avoid driving with a patch. Resume driving when you feel safe, but don't drive on the day of surgery.  PAIN: Minor pain and itching is normal during healing. DO NOT RUB THE EYE! Report any unusual swelling, increased discharge or pain that is not relieved by Tylenol (or equivalent). If sudden drop/change in vision call immediately. [facility] [phone]  CONTINUE TO USE ALL THE EYE DROPS PER THE INSTRUCTIONS ORDERED BY DR. WILKERSON'S TECHNICIANS  FOR EMERGENCY DR. QUINONES: [phone]  FOLLOW EYE DROP INSTRUCTIONS GIVEN BY 's OFFICE

## 2017-11-02 NOTE — OP NOTE
DATE OF SERVICE: 11/2/17      PREOPERATIVE DIAGNOSIS:     Cataract, left eye.       POSTOPERATIVE DIAGNOSIS:  Cataract, left eye.       PROCEDURE:  Phacoemulsification with intraocular lens implant, Model PCB00, 21.0 diopter power.       ANESTHESIA:  Topical with IV sedation.         DESCRIPTION OF PROCEDURE:   Operative risks, benefits and alternatives to the procedure were discussed at length with the patient including loss of vision, loss of the eye.  Patient voiced understanding and informed consent was signed.  The patient was taken to the operating suite, where an appropriate level of anesthesia was given.  Attention was placed to the left eye.  The patient was then prepped and draped in the usual sterile ophthalmic manner.  A lid speculum was placed in the eye to provide exposure and MVR blade was used to make a paracentesis.  Once this was performed, Shugarcaine was then placed intracamerally.  This was then followed by intracameral Viscoat.  A 2.75 mm blade was then used to make a biplanar temporal clear corneal incision.  A cystotome and uttrata were used to make a continuous curvilinear capsulorrhexis.  BSS on Mays cannula was then used to hydrodissect the lens.  Phacoemulsification was then performed in a divide-and-conquer technique to remove all pieces of the nucleus.  Irrigation aspiration was then used to remove all remaining cortical material and debris.  Amvisc was then used to fill the capsular bag.  A PCB00, 21.0 diopter lens was then injected into the capsular bag using the injector.  Once this was performed, a Goldberg secondary instrument was used to rotate the lens into proper position.  Irrigation aspiration was then used to remove all remaining viscoelastic material and center the lens appropriately.  BSS on 30 gauge cannula was then used to hydrate both wounds.  A Weck-Yesi was used to assess intraocular IOP.  Once this was stable and the lens was found to be in good position, the patient  was undraped.  The patient was brought to the recovery area in stable condition.  Family was made aware of the patient's condition and the patient will follow up with us later this afternoon.         COMPLICATIONS:  No complications.         Severiano Martin MD

## 2017-11-02 NOTE — ANESTHESIA PREPROCEDURE EVALUATION
Anesthesia Evaluation     . Pt has had prior anesthetic.     No history of anesthetic complications          ROS/MED HX    ENT/Pulmonary:     (+)tobacco use, Past use , . .    Neurologic:     (+)neuropathy - Diabetic,     Cardiovascular:     (+) Dyslipidemia, hypertension-range: not on beta blocker, ---. : . . . :. .       METS/Exercise Tolerance:     Hematologic:  - neg hematologic  ROS       Musculoskeletal:   (+) arthritis, , , -       GI/Hepatic:         Renal/Genitourinary:  - ROS Renal section negative       Endo:     (+) type II DM Last HgA1c: 9.3 date: 10/4/2017 Using insulin Diabetic complications: neuropathy, .      Psychiatric:     (+) psychiatric history depression      Infectious Disease:  - neg infectious disease ROS       Malignancy:      - no malignancy   Other:    - neg other ROS                 Physical Exam      Airway   Mallampati: III  TM distance: >3 FB  Neck ROM: full  Comment: Full beard    Dental   (+) upper dentures, lower dentures, partials, missing and chipped    Cardiovascular   Rhythm and rate: regular and normal      Pulmonary    breath sounds clear to auscultation                    Anesthesia Plan      History & Physical Review  History and physical reviewed and following examination; no interval change.    ASA Status:  2 .    NPO Status:  > 8 hours    Plan for MAC with Other induction. Maintenance will be Other.  Reason for MAC:  Procedure to face, neck, head or breast and Deep or markedly invasive procedure (G8)  PONV prophylaxis:  Ondansetron (or other 5HT-3) and Dexamethasone or Solumedrol       Postoperative Care  Postoperative pain management:  IV analgesics, Oral pain medications and Multi-modal analgesia.      Consents  Anesthetic plan, risks, benefits and alternatives discussed with:  Patient..                          .

## 2017-11-02 NOTE — IP AVS SNAPSHOT
HI Preop/Phase II    [address] [address]    Phone:  [phone]                                       After Visit Summary   11/2/2017    Duane E Arola    MRN: [id number]           After Visit Summary Signature Page     I have received my discharge instructions, and my questions have been answered. I have discussed any challenges I see with this plan with the nurse or doctor.    ..........................................................................................................................................  Patient/Patient Representative Signature      ..........................................................................................................................................  Patient Representative Print Name and Relationship to Patient    ..................................................               ................................................  Date                                            Time    ..........................................................................................................................................  Reviewed by Signature/Title    ...................................................              ..............................................  Date                                                            Time

## 2017-11-02 NOTE — ANESTHESIA CARE TRANSFER NOTE
Patient: Duane E Arola    Procedure(s):  CATARACT EXTRACTION LEFT EYE WITH IMPLANT - Wound Class: I-Clean    Diagnosis: CATARACT LEFT EYE  Diagnosis Additional Information: No value filed.    Anesthesia Type:   MAC     Note:    Patient transferred to:Phase II  Handoff Report: Identifed the Patient, Identified the Reponsible Provider, Reviewed the pertinent medical history, Discussed the surgical course, Reviewed Intra-OP anesthesia mangement and issues during anesthesia, Set expectations for post-procedure period and Allowed opportunity for questions and acknowledgement of understanding      Vitals: (Last set prior to Anesthesia Care Transfer)    CRNA VITALS  11/2/2017 1117 - 11/2/2017 1154      11/2/2017             Pulse: 72    SpO2: 100 %    Resp Rate (set): 8                Electronically Signed By: VARGAS Coppola CRNA  November 2, 2017  11:54 AM

## 2017-11-02 NOTE — OR NURSING
Patient and responsible adult given discharge instructions with no questions regarding instructions. Fredy score 20. Pain level 0/10.  Discharged from unit via amb. Patient discharged to home.

## 2017-11-03 NOTE — ANESTHESIA POSTPROCEDURE EVALUATION
Patient: Duane E Arola    Procedure(s):  CATARACT EXTRACTION LEFT EYE WITH IMPLANT - Wound Class: I-Clean    Diagnosis:CATARACT LEFT EYE  Diagnosis Additional Information: No value filed.    Anesthesia Type:  MAC    Note:  Anesthesia Post Evaluation    Patient location during evaluation: Phase 2 and Bedside  Patient participation: Able to fully participate in evaluation  Level of consciousness: awake and alert  Pain management: adequate  Airway patency: patent  Cardiovascular status: acceptable  Respiratory status: acceptable  Hydration status: stable  PONV: none     Anesthetic complications: None          Last vitals:  Vitals:    11/02/17 1155 11/02/17 1200 11/02/17 1205   BP: 155/82 180/92 155/71   Resp:      Temp:      SpO2: 97% 96% 97%         Electronically Signed By: Beau Tiwari MD  November 3, 2017  10:46 AM

## 2017-11-21 ENCOUNTER — TELEPHONE (OUTPATIENT)
Dept: EDUCATION SERVICES | Facility: HOSPITAL | Age: 72
End: 2017-11-21

## 2017-11-21 NOTE — TELEPHONE ENCOUNTER
Per your message called patient to schedule a follow up with Dahiana Reyna. Patient declined apt and stated he is following up with Dr. Forde.

## 2017-11-22 NOTE — TELEPHONE ENCOUNTER
Requested records from [facility] in [place] and they will be sending over last H&P, A1C, Lipid profile and Albumin Urine.

## 2018-01-16 ENCOUNTER — TRANSFERRED RECORDS (OUTPATIENT)
Dept: HEALTH INFORMATION MANAGEMENT | Facility: HOSPITAL | Age: 73
End: 2018-01-16

## 2018-01-16 LAB — HBA1C MFR BLD: 7.7 % (ref 4–6)

## 2018-01-25 RX ORDER — PIOGLITAZONE AND GLIMEPIRIDE 30; 2 MG/1; MG/1
1 TABLET ORAL DAILY
COMMUNITY

## 2018-01-26 ENCOUNTER — HOSPITAL ENCOUNTER (OUTPATIENT)
Facility: HOSPITAL | Age: 73
Discharge: HOME | End: 2018-01-26
Attending: INTERNAL MEDICINE | Admitting: INTERNAL MEDICINE
Payer: COMMERCIAL

## 2018-01-26 ENCOUNTER — ANESTHESIA EVENT (OUTPATIENT)
Dept: SURGERY | Facility: HOSPITAL | Age: 73
End: 2018-01-26
Payer: COMMERCIAL

## 2018-01-26 ENCOUNTER — ANESTHESIA (OUTPATIENT)
Dept: SURGERY | Facility: HOSPITAL | Age: 73
End: 2018-01-26
Payer: COMMERCIAL

## 2018-01-26 VITALS
BODY MASS INDEX: 39.55 KG/M2 | WEIGHT: 267 LBS | SYSTOLIC BLOOD PRESSURE: 130 MMHG | RESPIRATION RATE: 18 BRPM | OXYGEN SATURATION: 94 % | HEIGHT: 69 IN | DIASTOLIC BLOOD PRESSURE: 82 MMHG

## 2018-01-26 LAB
GLUCOSE BLDC GLUCOMTR-MCNC: 57 MG/DL (ref 70–99)
GLUCOSE BLDC GLUCOMTR-MCNC: 60 MG/DL (ref 70–99)
GLUCOSE BLDC GLUCOMTR-MCNC: 71 MG/DL (ref 70–99)
GLUCOSE BLDC GLUCOMTR-MCNC: 92 MG/DL (ref 70–99)

## 2018-01-26 PROCEDURE — 36000050 ZZH SURGERY LEVEL 2 1ST 30 MIN: Performed by: INTERNAL MEDICINE

## 2018-01-26 PROCEDURE — 45385 COLONOSCOPY W/LESION REMOVAL: CPT | Performed by: ANESTHESIOLOGY

## 2018-01-26 PROCEDURE — 25000128 H RX IP 250 OP 636: Performed by: ANESTHESIOLOGY

## 2018-01-26 PROCEDURE — 71000027 ZZH RECOVERY PHASE 2 EACH 15 MINS: Performed by: INTERNAL MEDICINE

## 2018-01-26 PROCEDURE — 99100 ANES PT EXTEME AGE<1 YR&>70: CPT | Performed by: NURSE ANESTHETIST, CERTIFIED REGISTERED

## 2018-01-26 PROCEDURE — 40000306 ZZH STATISTIC PRE PROC ASSESS II: Performed by: INTERNAL MEDICINE

## 2018-01-26 PROCEDURE — 45385 COLONOSCOPY W/LESION REMOVAL: CPT | Performed by: NURSE ANESTHETIST, CERTIFIED REGISTERED

## 2018-01-26 PROCEDURE — 25000128 H RX IP 250 OP 636: Performed by: NURSE ANESTHETIST, CERTIFIED REGISTERED

## 2018-01-26 PROCEDURE — 82962 GLUCOSE BLOOD TEST: CPT

## 2018-01-26 PROCEDURE — 37000008 ZZH ANESTHESIA TECHNICAL FEE, 1ST 30 MIN: Performed by: INTERNAL MEDICINE

## 2018-01-26 RX ORDER — DEXTROSE MONOHYDRATE 25 G/50ML
25 INJECTION, SOLUTION INTRAVENOUS
Status: DISCONTINUED | OUTPATIENT
Start: 2018-01-26 | End: 2018-01-26 | Stop reason: HOSPADM

## 2018-01-26 RX ORDER — FENTANYL CITRATE 50 UG/ML
25-50 INJECTION, SOLUTION INTRAMUSCULAR; INTRAVENOUS
Status: DISCONTINUED | OUTPATIENT
Start: 2018-01-26 | End: 2018-01-26 | Stop reason: HOSPADM

## 2018-01-26 RX ORDER — HYDRALAZINE HYDROCHLORIDE 20 MG/ML
2.5-5 INJECTION INTRAMUSCULAR; INTRAVENOUS EVERY 10 MIN PRN
Status: DISCONTINUED | OUTPATIENT
Start: 2018-01-26 | End: 2018-01-26 | Stop reason: HOSPADM

## 2018-01-26 RX ORDER — DEXAMETHASONE SODIUM PHOSPHATE 4 MG/ML
4 INJECTION, SOLUTION INTRA-ARTICULAR; INTRALESIONAL; INTRAMUSCULAR; INTRAVENOUS; SOFT TISSUE EVERY 10 MIN PRN
Status: DISCONTINUED | OUTPATIENT
Start: 2018-01-26 | End: 2018-01-26 | Stop reason: HOSPADM

## 2018-01-26 RX ORDER — NALOXONE HYDROCHLORIDE 0.4 MG/ML
.1-.4 INJECTION, SOLUTION INTRAMUSCULAR; INTRAVENOUS; SUBCUTANEOUS
Status: DISCONTINUED | OUTPATIENT
Start: 2018-01-26 | End: 2018-01-26 | Stop reason: HOSPADM

## 2018-01-26 RX ORDER — LIDOCAINE 40 MG/G
CREAM TOPICAL
Status: DISCONTINUED | OUTPATIENT
Start: 2018-01-26 | End: 2018-01-26 | Stop reason: HOSPADM

## 2018-01-26 RX ORDER — ONDANSETRON 4 MG/1
4 TABLET, ORALLY DISINTEGRATING ORAL EVERY 30 MIN PRN
Status: DISCONTINUED | OUTPATIENT
Start: 2018-01-26 | End: 2018-01-26 | Stop reason: HOSPADM

## 2018-01-26 RX ORDER — MEPERIDINE HYDROCHLORIDE 25 MG/ML
12.5 INJECTION INTRAMUSCULAR; INTRAVENOUS; SUBCUTANEOUS
Status: DISCONTINUED | OUTPATIENT
Start: 2018-01-26 | End: 2018-01-26 | Stop reason: HOSPADM

## 2018-01-26 RX ORDER — SODIUM CHLORIDE, SODIUM LACTATE, POTASSIUM CHLORIDE, CALCIUM CHLORIDE 600; 310; 30; 20 MG/100ML; MG/100ML; MG/100ML; MG/100ML
INJECTION, SOLUTION INTRAVENOUS CONTINUOUS
Status: DISCONTINUED | OUTPATIENT
Start: 2018-01-26 | End: 2018-01-26 | Stop reason: HOSPADM

## 2018-01-26 RX ORDER — PROMETHAZINE HYDROCHLORIDE 25 MG/ML
12.5 INJECTION, SOLUTION INTRAMUSCULAR; INTRAVENOUS
Status: DISCONTINUED | OUTPATIENT
Start: 2018-01-26 | End: 2018-01-26 | Stop reason: HOSPADM

## 2018-01-26 RX ORDER — ALBUTEROL SULFATE 0.83 MG/ML
2.5 SOLUTION RESPIRATORY (INHALATION) EVERY 4 HOURS PRN
Status: DISCONTINUED | OUTPATIENT
Start: 2018-01-26 | End: 2018-01-26 | Stop reason: HOSPADM

## 2018-01-26 RX ORDER — PROPOFOL 10 MG/ML
INJECTION, EMULSION INTRAVENOUS PRN
Status: DISCONTINUED | OUTPATIENT
Start: 2018-01-26 | End: 2018-01-26

## 2018-01-26 RX ORDER — ONDANSETRON 2 MG/ML
4 INJECTION INTRAMUSCULAR; INTRAVENOUS EVERY 30 MIN PRN
Status: DISCONTINUED | OUTPATIENT
Start: 2018-01-26 | End: 2018-01-26 | Stop reason: HOSPADM

## 2018-01-26 RX ADMIN — PROPOFOL 30 MG: 10 INJECTION, EMULSION INTRAVENOUS at 09:34

## 2018-01-26 RX ADMIN — PROPOFOL 40 MG: 10 INJECTION, EMULSION INTRAVENOUS at 09:44

## 2018-01-26 RX ADMIN — PROPOFOL 30 MG: 10 INJECTION, EMULSION INTRAVENOUS at 09:45

## 2018-01-26 RX ADMIN — PROPOFOL 30 MG: 10 INJECTION, EMULSION INTRAVENOUS at 09:40

## 2018-01-26 RX ADMIN — PROPOFOL 20 MG: 10 INJECTION, EMULSION INTRAVENOUS at 09:36

## 2018-01-26 RX ADMIN — PROPOFOL 30 MG: 10 INJECTION, EMULSION INTRAVENOUS at 09:38

## 2018-01-26 RX ADMIN — PROPOFOL 30 MG: 10 INJECTION, EMULSION INTRAVENOUS at 09:39

## 2018-01-26 RX ADMIN — PROPOFOL 30 MG: 10 INJECTION, EMULSION INTRAVENOUS at 09:41

## 2018-01-26 RX ADMIN — SODIUM CHLORIDE, POTASSIUM CHLORIDE, SODIUM LACTATE AND CALCIUM CHLORIDE: 600; 310; 30; 20 INJECTION, SOLUTION INTRAVENOUS at 08:53

## 2018-01-26 ASSESSMENT — LIFESTYLE VARIABLES: TOBACCO_USE: 1

## 2018-01-26 NOTE — ANESTHESIA CARE TRANSFER NOTE
Patient: Duane E Arola    Procedure(s):  COLONOSCOPY - Wound Class: II-Clean Contaminated    Diagnosis: HX OF COLON POLYPS  Diagnosis Additional Information: No value filed.    Anesthesia Type:   MAC     Note:  Airway :Nasal Cannula  Patient transferred to:Phase II  Handoff Report: Identifed the Patient, Identified the Reponsible Provider, Reviewed the pertinent medical history, Discussed the surgical course, Reviewed Intra-OP anesthesia mangement and issues during anesthesia, Set expectations for post-procedure period and Allowed opportunity for questions and acknowledgement of understanding      Vitals: (Last set prior to Anesthesia Care Transfer)    CRNA VITALS  1/26/2018 0921 - 1/26/2018 0952      1/26/2018             Pulse: 79    SpO2: 98 %    Resp Rate (set): 8                Electronically Signed By: VARGAS Trujillo CRNA  January 26, 2018  9:52 AM

## 2018-01-26 NOTE — IP AVS SNAPSHOT
MRN:[id number]                      After Visit Summary   1/26/2018    Duane E Arola    MRN: [id number]           Thank you!     Thank you for choosing [facility] for your care. Our goal is always to provide you with excellent care. Hearing back from our patients is one way we can continue to improve our services. Please take a few minutes to complete the written survey that you may receive in the mail after you visit with us. Thank you!        Patient Information     Date Of Birth          1945        About your hospital stay     You were admitted on:  January 26, 2018 You last received care in the:  [facility] Pre[facility]/Phase II    You were discharged on:  January 26, 2018       Who to Call     For medical emergencies, please call 911.  For non-urgent questions about your medical care, please call your primary care provider or clinic, [phone]  For questions related to your surgery, please call your surgery clinic        Attending Provider     Provider Specialty    Arnel Childers MD Gastroenterology       Primary Care Provider Office Phone # Fax #    Federico Sullivan  [phone]      Further instructions from your care team           INSTRUCTIONS AFTER COLONOSCOPY    WHEN YOU ARE BACK HOME:    Plan to rest for an hour or two after you get home.    You may have some cramping or pressure until you pass gas.    You may resume your regular medications.    Eat a small, light meal at first, and then gradually return to normal meal sizes.  If you had a polyp removed:    Slight bleeding may occur.  You may have a slight blood stain on the toilet paper after a bowel movement.    To lessen the chance of bleeding, avoid heavy exercise for ONE WEEK.  This includes heavy lifting, vigorous sport activities, and heavy physical labor.  You may resume your normal sexual activity.      Avoid aspirin or aspirin products if instructed by your doctor.    WHAT TO WATCH FOR:  Problems rarely occur  "after the exam; however, it is important for you to watch for early signs of possible problems.  If you have     Unusual pain in your abdomen    Nausea and vomiting that persists    Excessive bleeding    Black or bloody bowel movements    Fever or temperature above 100.6 F  Please call your doctor ([facility] [phone]) or go to the nearest hospital emergency room.    Post-Anesthesia Patient Instructions    IMMEDIATELY FOLLOWING SURGERY:  Do not drive or operate machinery for the first twenty four hours after surgery.  Do not make any important decisions for twenty four hours after surgery or while taking narcotic pain medications or sedatives.  If you develop intractable nausea and vomiting or a severe headache please notify your doctor immediately.    FOLLOW-UP:  Please make an appointment with your surgeon as instructed. You do not need to follow up with anesthesia unless specifically instructed to do so.    WOUND CARE INSTRUCTIONS (if applicable):  Keep a dry clean dressing on the anesthesia/puncture wound site if there is drainage.  Once the wound has quit draining you may leave it open to air.  Generally you should leave the bandage intact for twenty four hours unless there is drainage.  If the epidural site drains for more than 36-48 hours please call the anesthesia department.    QUESTIONS?:  Please feel free to call your physician or the hospital  if you have any questions, and they will be happy to assist you.       Pending Results     No orders found from 1/24/2018 to 1/27/2018.            Admission Information     Date & Time Provider Department Dept. Phone    1/26/2018 Arnel Childers MD [facility] Preop/Phase [phone]      Your Vitals Were     Blood Pressure Respirations Height Weight Pulse Oximetry BMI (Body Mass Index)    99/54 16 1.753 m (5' 9\") 121.1 kg (267 lb) 95% 39.43 kg/m2      MyChart Information     [employer] lets you send messages to your doctor, view your test results, renew your " "prescriptions, schedule appointments and more. To sign up, go to [email or web address] . Click on \"Log in\" on the left side of the screen, which will take you to the Welcome page. Then click on \"Sign up Now\" on the right side of the page.     You will be asked to enter the access code listed below, as well as some personal information. Please follow the directions to create your username and password.     Your access code is: [email or web address]  Expires: 2018 11:02 AM     Your access code will  in 90 days. If you need help or a new code, please call your [facility] clinic or [phone].        Care EveryWhere ID     This is your Care EveryWhere ID. This could be used by other organizations to access your [facility] medical records  [id number]        Equal Access to Services     MATT RIOS : Piedad Murray, kyra dewitt, maynor helm, nithin russell . So [facility] [phone].    ATENCIÓN: Si habla español, tiene a bell disposición servicios gratuitos de asistencia lingüística. Petrona al [phone].    We comply with applicable federal civil rights laws and [place] laws. We do not discriminate on the basis of race, color, national origin, age, disability, sex, sexual orientation, or gender identity.               Review of your medicines      CONTINUE these medicines which may have CHANGED, or have new prescriptions. If we are uncertain of the size of tablets/capsules you have at home, strength may be listed as something that might have changed.        Dose / Directions    insulin degludec 200 UNIT/ML pen   Commonly known as:  TRESIBA FLEXTOUCH   This may have changed:  how much to take   Used for:  Type 2 diabetes mellitus with hyperglycemia, with long-term current use of insulin (H)        Dose:  110 Units   Inject 110 Units Subcutaneous daily   Quantity:  15 mL   Refills:  3         CONTINUE these medicines which have NOT CHANGED        Dose / " Directions    aloe vera Gel   Used for:  Type 2 diabetes mellitus with hyperglycemia, with long-term current use of insulin (H)        Uses it in his coffee daily   Refills:  0       ASPIRIN PO        Dose:  162 mg   Take 162 mg by mouth daily   Refills:  0       CENTRUM SILVER per tablet        Dose:  1 tablet   Take 1 tablet by mouth daily.   Refills:  0       garlic 150 MG Tabs tablet        Dose:  150 mg   Take 150 mg by mouth daily.   Refills:  0       hypromellose 0.5 % Soln ophthalmic solution   Commonly known as:  ARTIFICIAL TEARS        Dose:  1 drop   1 drop every hour as needed for dry eyes   Refills:  0       * insulin pen needle 32G X 4 MM   Commonly known as:  BD CK U/F   Used for:  Diabetes mellitus, type 2 (H)        Use 2 pen needles daily.   Quantity:  200 each   Refills:  6       * insulin pen needle 31G X 6 MM   Commonly known as:  TOPCARE CLICKFINE PEN NEEDLES   Used for:  Type 2 diabetes mellitus with hyperglycemia (H)        Use pen needles daily or as directed.   Quantity:  100 each   Refills:  12       Pioglitazone HCl-Glimepiride 30-2 MG Tabs        Dose:  1 tablet   Take 1 tablet by mouth daily   Refills:  0       SIMVASTATIN PO        Dose:  20 mg   Take 20 mg by mouth daily   Refills:  0       VITAMIN B-12 PO        Take by mouth daily   Refills:  0       * Notice:  This list has 2 medication(s) that are the same as other medications prescribed for you. Read the directions carefully, and ask your doctor or other care provider to review them with you.             Protect others around you: Learn how to safely use, store and throw away your medicines at www.disposemymeds.org.             Medication List: This is a list of all your medications and when to take them. Check marks below indicate your daily home schedule. Keep this list as a reference.      Medications           Morning Afternoon Evening Bedtime As Needed    aloe vera Gel   Uses it in his coffee daily                                 ASPIRIN PO   Take 162 mg by mouth daily                                CENTRUM SILVER per tablet   Take 1 tablet by mouth daily.                                garlic 150 MG Tabs tablet   Take 150 mg by mouth daily.                                hypromellose 0.5 % Soln ophthalmic solution   Commonly known as:  ARTIFICIAL TEARS   1 drop every hour as needed for dry eyes                                insulin degludec 200 UNIT/ML pen   Commonly known as:  TRESIBA FLEXTOUCH   Inject 110 Units Subcutaneous daily                                * insulin pen needle 32G X 4 MM   Commonly known as:  BD CK U/F   Use 2 pen needles daily.                                * insulin pen needle 31G X 6 MM   Commonly known as:  TOPCARE CLICKFINE PEN NEEDLES   Use pen needles daily or as directed.                                Pioglitazone HCl-Glimepiride 30-2 MG Tabs   Take 1 tablet by mouth daily                                SIMVASTATIN PO   Take 20 mg by mouth daily                                VITAMIN B-12 PO   Take by mouth daily                                * Notice:  This list has 2 medication(s) that are the same as other medications prescribed for you. Read the directions carefully, and ask your doctor or other care provider to review them with you.

## 2018-01-26 NOTE — IP AVS SNAPSHOT
[facility] Preop/Phase II    [address] [address]    Phone:  [phone]                                       After Visit Summary   1/26/2018    Duane E Arola    MRN: [id number]           After Visit Summary Signature Page     I have received my discharge instructions, and my questions have been answered. I have discussed any challenges I see with this plan with the nurse or doctor.    ..........................................................................................................................................  Patient/Patient Representative Signature      ..........................................................................................................................................  Patient Representative Print Name and Relationship to Patient    ..................................................               ................................................  Date                                            Time    ..........................................................................................................................................  Reviewed by Signature/Title    ...................................................              ..............................................  Date                                                            Time

## 2018-01-26 NOTE — OR NURSING
Blood glucose upon arrival from colonoscopy 52,grape juice and cookies given, increased to 60.Patient alert no sweating or clamminess.  Orange juice and arcelia crackers given. Blood glucose up to 70. Dr Perry and Liseth aware and ok to discharge home, patient checked glucose on home meter and result was 130. Remains alert and denies pain or nausea. Discharge instructions given to patient and friend, verbalized understanding. Patiemt discharged to home.  Fredy score 19. Pain level 0/10.  Discharged from unit via ambulation.

## 2018-01-26 NOTE — DISCHARGE INSTRUCTIONS
INSTRUCTIONS AFTER COLONOSCOPY    WHEN YOU ARE BACK HOME:    Plan to rest for an hour or two after you get home.    You may have some cramping or pressure until you pass gas.    You may resume your regular medications.    Eat a small, light meal at first, and then gradually return to normal meal sizes.  If you had a polyp removed:    Slight bleeding may occur.  You may have a slight blood stain on the toilet paper after a bowel movement.    To lessen the chance of bleeding, avoid heavy exercise for ONE WEEK.  This includes heavy lifting, vigorous sport activities, and heavy physical labor.  You may resume your normal sexual activity.      Avoid aspirin or aspirin products if instructed by your doctor.    WHAT TO WATCH FOR:  Problems rarely occur after the exam; however, it is important for you to watch for early signs of possible problems.  If you have     Unusual pain in your abdomen    Nausea and vomiting that persists    Excessive bleeding    Black or bloody bowel movements    Fever or temperature above 100.6 F  Please call your doctor ([facility] [phone]) or go to the nearest hospital emergency room.    Post-Anesthesia Patient Instructions    IMMEDIATELY FOLLOWING SURGERY:  Do not drive or operate machinery for the first twenty four hours after surgery.  Do not make any important decisions for twenty four hours after surgery or while taking narcotic pain medications or sedatives.  If you develop intractable nausea and vomiting or a severe headache please notify your doctor immediately.    FOLLOW-UP:  Please make an appointment with your surgeon as instructed. You do not need to follow up with anesthesia unless specifically instructed to do so.    WOUND CARE INSTRUCTIONS (if applicable):  Keep a dry clean dressing on the anesthesia/puncture wound site if there is drainage.  Once the wound has quit draining you may leave it open to air.  Generally you should leave the bandage intact for twenty four  hours unless there is drainage.  If the epidural site drains for more than 36-48 hours please call the anesthesia department.    QUESTIONS?:  Please feel free to call your physician or the hospital  if you have any questions, and they will be happy to assist you.

## 2018-01-26 NOTE — ANESTHESIA PREPROCEDURE EVALUATION
Anesthesia Evaluation     . Pt has had prior anesthetic.            ROS/MED HX    ENT/Pulmonary:     (+)ELISA risk factors (BMI: 37.62) hypertension, obese, tobacco use, Past use , . .    Neurologic:     (+)neuropathy - Diabetic,     Cardiovascular:     (+) Dyslipidemia, hypertension-range: not on beta blocker, ---. : . . . :. . Previous cardiac testing date:results:date: results:ECG reviewed date:1/16/2018 results:SR@71, LAD c/w Pulmonary Disease, NS TWA date: results:          METS/Exercise Tolerance:     Hematologic:  - neg hematologic  ROS       Musculoskeletal:   (+) arthritis, , , -       GI/Hepatic:     (+) bowel prep,       Renal/Genitourinary:  - ROS Renal section negative       Endo:     (+) type II DM Last HgA1c: 7.7 date: 1/16/2018 Using insulin Normal glucose range: 115 --> 92 Diabetic complications: neuropathy, Obesity, .      Psychiatric:     (+) psychiatric history depression      Infectious Disease:  - neg infectious disease ROS       Malignancy:      - no malignancy   Other:    - neg other ROS                 Physical Exam      Airway   Mallampati: III  TM distance: >3 FB  Neck ROM: full  Comment: Full Beard    Dental   (+) upper dentures, lower dentures, partials and missing    Cardiovascular   Rhythm and rate: regular and normal      Pulmonary    breath sounds clear to auscultation                    Anesthesia Plan      History & Physical Review  History and physical reviewed and following examination; no interval change.    ASA Status:  3 .    NPO Status:  > 8 hours    Plan for MAC with Intravenous and Propofol induction. Maintenance will be TIVA.  Reason for MAC:  Chronic cardiopulmonary disease (G9) and Other - see comments  PONV prophylaxis:  Ondansetron (or other 5HT-3)  Surgeon requests deep sedation. Patient is an ASA 3 and has advanced age >70. Will provide MAC.      Postoperative Care  Postoperative pain management:  IV analgesics.      Consents  Anesthetic plan, risks, benefits and  alternatives discussed with:  Patient..                          .

## 2018-01-26 NOTE — ANESTHESIA POSTPROCEDURE EVALUATION
Patient: Duane E Arola    Procedure(s):  COLONOSCOPY - Wound Class: II-Clean Contaminated    Diagnosis:HX OF COLON POLYPS  Diagnosis Additional Information: No value filed.    Anesthesia Type:  MAC    Note:  Anesthesia Post Evaluation    Patient location during evaluation: Phase 2  Patient participation: Able to fully participate in evaluation  Level of consciousness: awake and alert  Pain management: adequate  Airway patency: patent  Cardiovascular status: acceptable  Respiratory status: acceptable  Hydration status: acceptable  PONV: none             Last vitals:  Vitals:    01/26/18 1030 01/26/18 1045 01/26/18 1100   BP: 125/69 125/67 130/82   Resp: 18 18 18   SpO2: 95% 95% 94%         Electronically Signed By: VARGAS Lin CRNA  January 26, 2018  2:29 PM

## 2018-01-26 NOTE — BRIEF OP NOTE
[facility] Brief Op Note    Pre-operative diagnosis: HX OF COLON POLYPS   Post-operative diagnosis Normal Colonoscopy   Procedure: Procedure(s):  COLONOSCOPY - Wound Class: II-Clean Contaminated   Surgeon: Arnel Childers MD   Anesthesia: Monitor Anesthesia Care    Total IV fluids: (See anesthesia record)   Drains: None   Specimens: None   Findings: Normal colonoscopy   Complications: None   Condition: Stable   Comments: See dictated operative report for full details     Arnel Childers MD  1/26/2018  9:51 AM

## 2018-01-26 NOTE — H&P
Pre-Endoscopy History and Physical     Duane E Arola MRN# [id number]   YOB: 1945 Age: 72 year old     Date of Procedure: 1/26/2018  Primary care provider: Federico Sullivan  Type of Endoscopy: Colonoscopy with possible biopsy, possible polypectomy  Reason for Procedure: History of Polyps, Family History of Colon Cancer  Type of Anesthesia Anticipated: MAC    HPI:    Duane is a 72 year old male who will be undergoing the above procedure.      A history and physical has been performed. The patient's medications and allergies have been reviewed. The risks and benefits of the procedure and the sedation options and risks were discussed with the patient.  All questions were answered and informed consent was obtained.      He denies a personal or family history of anesthesia complications or bleeding disorders.     Patient Active Problem List   Diagnosis     Fibula fracture     Cellulitis of leg     Diabetes mellitus, type 2 (H)     Cellulitis     ACP (advance care planning)        History reviewed. No pertinent past medical history.     Past Surgical History:   Procedure Laterality Date     COLONOSCOPY - HIM SCAN  06/2012    [facility]     COLONOSCOPY - HIM SCAN  06/01/2006    Colonoscopy, Dr Marquez, [facility] 6/2006     PHACOEMULSIFICATION WITH STANDARD INTRAOCULAR LENS IMPLANT Right 3/22/2016    Procedure: PHACOEMULSIFICATION WITH STANDARD INTRAOCULAR LENS IMPLANT;  Surgeon: Ramses Mcnulty MD;  Location: [facility] OR     PHACOEMULSIFICATION WITH STANDARD INTRAOCULAR LENS IMPLANT Left 11/2/2017    Procedure: PHACOEMULSIFICATION WITH STANDARD INTRAOCULAR LENS IMPLANT;  CATARACT EXTRACTION LEFT EYE WITH IMPLANT;  Surgeon: Severiano Martin MD;  Location: [facility] OR       Social History   Substance Use Topics     Smoking status: Never Smoker     Smokeless tobacco: Not on file     Alcohol use No       History reviewed. No pertinent family history.    Prior to Admission medications    Medication Sig Start Date End Date Taking?  "Authorizing Provider   Pioglitazone HCl-Glimepiride 30-2 MG TABS Take 1 tablet by mouth daily   Yes Reported, Patient   insulin degludec (TRESIBA FLEXTOUCH) 200 UNIT/ML pen Inject 110 Units Subcutaneous daily  Patient taking differently: Inject 120 Units Subcutaneous daily  3/15/17  Yes Dahiana Reyna NP   insulin pen needle (TOPCARE CLICKFINE PEN NEEDLES) 31G X 6 MM Use pen needles daily or as directed. 4/7/16  Yes Dahiana Reyna NP   SIMVASTATIN PO Take 20 mg by mouth daily   Yes Reported, Patient   insulin pen needle (BD CK U/F) 32G X 4 MM Use 2 pen needles daily. 3/31/15  Yes Donavan Melgar MD   ASPIRIN PO Take 162 mg by mouth daily    Yes Reported, Patient   garlic 150 MG TABS Take 150 mg by mouth daily.   Yes Reported, Patient   Multiple Vitamins-Minerals (CENTRUM SILVER) per tablet Take 1 tablet by mouth daily.   Yes Reported, Patient   Cyanocobalamin (VITAMIN B-12 PO) Take by mouth daily    Reported, Patient   hypromellose (ARTIFICIAL TEARS) 0.5 % SOLN ophthalmic solution 1 drop every hour as needed for dry eyes    Reported, Patient   aloe vera GEL Uses it in his coffee daily 3/15/17   Dahiana Reyna NP       Allergies   Allergen Reactions     Metformin      Legs get weak     Seafood Hives        REVIEW OF SYSTEMS:   5 point ROS negative except as noted above in HPI, including Gen., Resp., CV, GI &  system review.    PHYSICAL EXAM:   Resp 16  Ht 1.753 m (5' 9\")  Wt 121.1 kg (267 lb)  SpO2 97%  BMI 39.43 kg/m2 Estimated body mass index is 39.43 kg/(m^2) as calculated from the following:    Height as of this encounter: 1.753 m (5' 9\").    Weight as of this encounter: 121.1 kg (267 lb).   GENERAL APPEARANCE: alert, and oriented  MENTAL STATUS: alert  AIRWAY EXAM: Mallampatti Class II (visualization of the soft palate, fauces, and uvula)  RESP: lungs clear to auscultation - no rales, rhonchi or wheezes  CV: regular rates and rhythm  DIAGNOSTICS:    Not indicated    IMPRESSION   ASA Class 2 - Mild " systemic disease    PLAN:   Plan for Colonoscopy with possible biopsy, possible polypectomy. We discussed the risks, benefits and alternatives and the patient wished to proceed.    The above has been forwarded to the consulting provider.      Signed Electronically by: Arnel Childers  January 26, 2018

## 2018-01-26 NOTE — OP NOTE
Procedure Date: 2018      DATE OF SURGERY:  2018      PRIMARY CARE PHYSICIAN:  Federico Sullivan.      NAME OF PROCEDURE:  Colonoscopy.      PREPROCEDURE DIAGNOSES:    1.  History of polyps.   2.  Family medical history of colon cancer.      HISTORY OF PRESENT ILLNESS:  Please refer to H&P for further details.      PHYSICAL EXAMINATION:  Cardiopulmonary examination unchanged from previous exam.  Please refer to H&P for further details.      MEDICATIONS:  MAC per anesthesia service.  Monitored parameters were within normal limits throughout.      PROCEDURE:  After informed consent was obtained, the patient was placed in the left lateral decubitus position and adult video colonoscope was advanced all the way to the cecum.  Slow withdrawal with copious irrigation and suctioning improved our views.  There were hyperplastic polyps in the rectum, but no abnormal mucosa.  The air was then desufflated and the scope was withdrawn fully and completely without any complications.      POST PROCEDURE DIAGNOSIS:  Normal colonoscopy.      RECOMMENDATIONS:   1.  Repeat colonoscopy in 5 years.   2.  Discharge home.      Thank you for allowing us to participate in his management.         BE FAJARDO MD             D: 2018   T: 2018   MT: TAL      Name:     AROLA, DUANE   MRN:      [id number]        Account:        [id number]   :      1945           Procedure Date: 2018      Document: [id number]       cc: Federico Sullivan DO

## 2018-03-16 ENCOUNTER — HOSPITAL ENCOUNTER (EMERGENCY)
Facility: HOSPITAL | Age: 73
Discharge: HOME | End: 2018-03-16
Attending: FAMILY MEDICINE | Admitting: FAMILY MEDICINE
Payer: COMMERCIAL

## 2018-03-16 ENCOUNTER — APPOINTMENT (OUTPATIENT)
Dept: ULTRASOUND IMAGING | Facility: HOSPITAL | Age: 73
End: 2018-03-16
Attending: FAMILY MEDICINE
Payer: COMMERCIAL

## 2018-03-16 ENCOUNTER — APPOINTMENT (OUTPATIENT)
Dept: GENERAL RADIOLOGY | Facility: HOSPITAL | Age: 73
End: 2018-03-16
Attending: FAMILY MEDICINE
Payer: COMMERCIAL

## 2018-03-16 VITALS
DIASTOLIC BLOOD PRESSURE: 86 MMHG | TEMPERATURE: 96.7 F | WEIGHT: 250 LBS | OXYGEN SATURATION: 97 % | BODY MASS INDEX: 36.92 KG/M2 | HEART RATE: 86 BPM | RESPIRATION RATE: 16 BRPM | SYSTOLIC BLOOD PRESSURE: 138 MMHG

## 2018-03-16 DIAGNOSIS — R10.84 ABDOMINAL PAIN, GENERALIZED: ICD-10-CM

## 2018-03-16 DIAGNOSIS — K82.8 GALLBLADDER SLUDGE: ICD-10-CM

## 2018-03-16 LAB
ALBUMIN SERPL-MCNC: 3.7 G/DL (ref 3.4–5)
ALP SERPL-CCNC: 43 U/L (ref 40–150)
ALT SERPL W P-5'-P-CCNC: 45 U/L (ref 0–70)
ANION GAP SERPL CALCULATED.3IONS-SCNC: 5 MMOL/L (ref 3–14)
AST SERPL W P-5'-P-CCNC: 39 U/L (ref 0–45)
BILIRUB SERPL-MCNC: 0.4 MG/DL (ref 0.2–1.3)
BUN SERPL-MCNC: 19 MG/DL (ref 7–30)
CALCIUM SERPL-MCNC: 8.1 MG/DL (ref 8.5–10.1)
CHLORIDE SERPL-SCNC: 104 MMOL/L (ref 94–109)
CO2 SERPL-SCNC: 29 MMOL/L (ref 20–32)
CREAT SERPL-MCNC: 0.97 MG/DL (ref 0.66–1.25)
D DIMER PPP DDU-MCNC: 209 NG/ML D-DU (ref 0–300)
FLUAV+FLUBV AG SPEC QL: NEGATIVE
FLUAV+FLUBV AG SPEC QL: NEGATIVE
GFR SERPL CREATININE-BSD FRML MDRD: 75 ML/MIN/1.7M2
GLUCOSE BLDC GLUCOMTR-MCNC: 222 MG/DL (ref 70–99)
GLUCOSE SERPL-MCNC: 249 MG/DL (ref 70–99)
LIPASE SERPL-CCNC: 63 U/L (ref 73–393)
NT-PROBNP SERPL-MCNC: 64 PG/ML (ref 0–1800)
POTASSIUM SERPL-SCNC: 4.2 MMOL/L (ref 3.4–5.3)
PROT SERPL-MCNC: 7.9 G/DL (ref 6.8–8.8)
SODIUM SERPL-SCNC: 138 MMOL/L (ref 133–144)
SPECIMEN SOURCE: NORMAL
TROPONIN I SERPL-MCNC: 0.03 UG/L (ref 0–0.04)
TROPONIN I SERPL-MCNC: 0.03 UG/L (ref 0–0.04)

## 2018-03-16 PROCEDURE — 85379 FIBRIN DEGRADATION QUANT: CPT | Performed by: FAMILY MEDICINE

## 2018-03-16 PROCEDURE — 99285 EMERGENCY DEPT VISIT HI MDM: CPT | Mod: 25

## 2018-03-16 PROCEDURE — 84484 ASSAY OF TROPONIN QUANT: CPT | Performed by: FAMILY MEDICINE

## 2018-03-16 PROCEDURE — 99285 EMERGENCY DEPT VISIT HI MDM: CPT | Mod: Z6 | Performed by: FAMILY MEDICINE

## 2018-03-16 PROCEDURE — 76705 ECHO EXAM OF ABDOMEN: CPT | Mod: TC

## 2018-03-16 PROCEDURE — 00000146 ZZHCL STATISTIC GLUCOSE BY METER IP

## 2018-03-16 PROCEDURE — 36415 COLL VENOUS BLD VENIPUNCTURE: CPT | Performed by: FAMILY MEDICINE

## 2018-03-16 PROCEDURE — 96374 THER/PROPH/DIAG INJ IV PUSH: CPT

## 2018-03-16 PROCEDURE — 87804 INFLUENZA ASSAY W/OPTIC: CPT | Performed by: FAMILY MEDICINE

## 2018-03-16 PROCEDURE — 83880 ASSAY OF NATRIURETIC PEPTIDE: CPT | Performed by: FAMILY MEDICINE

## 2018-03-16 PROCEDURE — 93010 ELECTROCARDIOGRAM REPORT: CPT | Performed by: INTERNAL MEDICINE

## 2018-03-16 PROCEDURE — 83690 ASSAY OF LIPASE: CPT | Performed by: FAMILY MEDICINE

## 2018-03-16 PROCEDURE — 25000132 ZZH RX MED GY IP 250 OP 250 PS 637: Performed by: FAMILY MEDICINE

## 2018-03-16 PROCEDURE — 93005 ELECTROCARDIOGRAM TRACING: CPT

## 2018-03-16 PROCEDURE — 71045 X-RAY EXAM CHEST 1 VIEW: CPT | Mod: TC

## 2018-03-16 PROCEDURE — 80053 COMPREHEN METABOLIC PANEL: CPT | Performed by: FAMILY MEDICINE

## 2018-03-16 PROCEDURE — 25000128 H RX IP 250 OP 636: Performed by: FAMILY MEDICINE

## 2018-03-16 RX ORDER — ONDANSETRON 4 MG/1
4 TABLET, ORALLY DISINTEGRATING ORAL EVERY 8 HOURS PRN
Qty: 10 TABLET | Refills: 0 | Status: SHIPPED | OUTPATIENT
Start: 2018-03-16 | End: 2018-03-19

## 2018-03-16 RX ORDER — ONDANSETRON 2 MG/ML
4 INJECTION INTRAMUSCULAR; INTRAVENOUS ONCE
Status: COMPLETED | OUTPATIENT
Start: 2018-03-16 | End: 2018-03-16

## 2018-03-16 RX ORDER — NITROGLYCERIN 0.4 MG/1
0.4 TABLET SUBLINGUAL EVERY 5 MIN PRN
Status: DISCONTINUED | OUTPATIENT
Start: 2018-03-16 | End: 2018-03-16 | Stop reason: HOSPADM

## 2018-03-16 RX ORDER — ASPIRIN 325 MG
325 TABLET ORAL ONCE
Status: COMPLETED | OUTPATIENT
Start: 2018-03-16 | End: 2018-03-16

## 2018-03-16 RX ADMIN — ONDANSETRON 4 MG: 2 INJECTION INTRAMUSCULAR; INTRAVENOUS at 06:49

## 2018-03-16 RX ADMIN — ASPIRIN 325 MG ORAL TABLET 325 MG: 325 PILL ORAL at 07:25

## 2018-03-16 RX ADMIN — NITROGLYCERIN 0.4 MG: 0.4 TABLET SUBLINGUAL at 07:25

## 2018-03-16 ASSESSMENT — ENCOUNTER SYMPTOMS
ANAL BLEEDING: 0
BLOOD IN STOOL: 0
ACTIVITY CHANGE: 0
PALPITATIONS: 0
CHEST TIGHTNESS: 0
FEVER: 0
NAUSEA: 1
UNEXPECTED WEIGHT CHANGE: 0
APNEA: 0
CONSTIPATION: 0
COUGH: 0
CHILLS: 1
MUSCULOSKELETAL NEGATIVE: 1
ABDOMINAL PAIN: 1
SORE THROAT: 0
DIAPHORESIS: 0
CHOKING: 0
FACIAL SWELLING: 0
PSYCHIATRIC NEGATIVE: 1
SINUS PAIN: 0
EYES NEGATIVE: 1
WHEEZING: 0
RECTAL PAIN: 0
VOMITING: 1
ABDOMINAL DISTENTION: 0
TROUBLE SWALLOWING: 0
SHORTNESS OF BREATH: 0
RHINORRHEA: 0
FATIGUE: 0
STRIDOR: 0
VOICE CHANGE: 0
APPETITE CHANGE: 0
NEUROLOGICAL NEGATIVE: 1
SINUS PRESSURE: 0
DIARRHEA: 0
HEMATOLOGIC/LYMPHATIC NEGATIVE: 1

## 2018-03-16 NOTE — ED AVS SNAPSHOT
[facility]    [address] [address]    Phone:  [phone]                                       Duane E Arola   MRN: [id number]    Department:  [facility]   Date of Visit:  3/16/2018           Patient Information     Date Of Birth          1945        Your diagnoses for this visit were:     Gallbladder sludge     Abdominal pain, generalized        You were seen by Gena Talavera MD and Alma Gomez MD.        Discharge Instructions       Duane,    Your labs and X-rays were normal.  Your gallbladder has some debris in it.  I have made a referral to general surgery and they will be contacting you for an appointment.  Nancy has been e-scribed to [facility] pharmacy at [facility].     Discharge References/Attachments     GASTROENTERITIS, VIRAL (ADULT) (ENGLISH)      ED Discharge Orders     GENERAL SURG ADULT REFERRAL       Your provider has referred you to: [facility] [phone]  [email or web address]    Please be aware that coverage of these services is subject to the terms and limitations of your health insurance plan.  Call member services at your health plan with any benefit or coverage questions.      Please bring the following with you to your appointment:    (1) Any X-Rays, CTs or MRIs which have been performed.  Contact the facility where they were done to arrange for  prior to your scheduled appointment.   (2) List of current medications   (3) This referral request   (4) Any documents/labs given to you for this referral                     Review of your medicines      START taking        Dose / Directions Last dose taken    ondansetron 4 MG ODT tab   Commonly known as:  ZOFRAN ODT   Dose:  4 mg   Quantity:  10 tablet        Take 1 tablet (4 mg) by mouth every 8 hours as needed for nausea   Refills:  0          Our records show that you are taking the  medicines listed below. If these are incorrect, please call your family doctor or clinic.        Dose / Directions Last dose taken    aloe vera Gel        Uses it in his coffee daily   Refills:  0        ASPIRIN PO   Dose:  162 mg        Take 162 mg by mouth daily   Refills:  0        CENTRUM SILVER per tablet   Dose:  1 tablet        Take 1 tablet by mouth daily.   Refills:  0        Cranberry 1000 MG Caps        Refills:  0        garlic 150 MG Tabs tablet   Dose:  150 mg        Take 150 mg by mouth daily.   Refills:  0        hypromellose 0.5 % Soln ophthalmic solution   Commonly known as:  ARTIFICIAL TEARS   Dose:  1 drop        1 drop every hour as needed for dry eyes   Refills:  0        insulin degludec 200 UNIT/ML pen   Commonly known as:  TRESIBA FLEXTOUCH   Dose:  110 Units   Quantity:  15 mL        Inject 110 Units Subcutaneous daily   Refills:  3        * insulin pen needle 32G X 4 MM   Commonly known as:  BD CK U/F   Quantity:  200 each        Use 2 pen needles daily.   Refills:  6        * insulin pen needle 31G X 6 MM   Commonly known as:  TOPCARE CLICKFINE PEN NEEDLES   Quantity:  100 each        Use pen needles daily or as directed.   Refills:  12        Pioglitazone HCl-Glimepiride 30-2 MG Tabs   Dose:  1 tablet        Take 1 tablet by mouth daily   Refills:  0        SIMVASTATIN PO   Dose:  20 mg        Take 20 mg by mouth daily   Refills:  0        * Notice:  This list has 2 medication(s) that are the same as other medications prescribed for you. Read the directions carefully, and ask your doctor or other care provider to review them with you.            Prescriptions were sent or printed at these locations (1 Prescription)                   [facility] [address] [address]    Telephone:  [phone]   Fax:  [phone]   Hours:                  E-Prescribed (1 of 1)         ondansetron (ZOFRAN ODT) 4 MG ODT tab               "  Procedures and tests performed during your visit     Procedure/Test Number of Times Performed    Cardiac Continuous Monitoring 2    Chest  XR, 1 view portable 1    Comprehensive metabolic panel 1    D-Dimer ([facility]) 1    EKG 12 lead 1    Glucose by meter 1    Glucose monitor nursing POCT 1    Influenza A/B antigen 1    Lipase 1    NT pro BNP 1    Troponin I 1    Troponin I (second draw) 1    US Abdomen Limited 1      Orders Needing Specimen Collection     None      Pending Results     No orders found from 3/14/2018 to 3/17/2018.            Pending Culture Results     No orders found from 3/14/2018 to 3/17/2018.            Thank you for choosing [facility]       Thank you for choosing [facility] for your care. Our goal is always to provide you with excellent care. Hearing back from our patients is one way we can continue to improve our services. Please take a few minutes to complete the written survey that you may receive in the mail after you visit with us. Thank you!        [facility] Information     [email or web address] lets you send messages to your doctor, view your test results, renew your prescriptions, schedule appointments and more. To sign up, go to [email or web address] . Click on \"Log in\" on the left side of the screen, which will take you to the Welcome page. Then click on \"Sign up Now\" on the right side of the page.     You will be asked to enter the access code listed below, as well as some personal information. Please follow the directions to create your username and password.     Your access code is: [email or web address]  Expires: 2018 10:53 AM     Your access code will  in 90 days. If you need help or a new code, please call your [facility] clinic or [phone].        Care EveryWhere ID     This is your Care EveryWhere ID. This could be used by other organizations to access your [facility] medical records  [id number]        Equal Access to Services     MATT RIOS AH: kyra Kennedy " maynor dewitt waxay idiin hayaan adeeg kharash la'aan ah. So [facility] [phone].    ATENCIÓN: Si habla rosa, tiene a bell disposición servicios gratuitos de asistencia lingüística. Llame al [phone].    We comply with applicable federal civil rights laws and [place] laws. We do not discriminate on the basis of race, color, national origin, age, disability, sex, sexual orientation, or gender identity.            After Visit Summary       This is your record. Keep this with you and show to your community pharmacist(s) and doctor(s) at your next visit.

## 2018-03-16 NOTE — ED AVS SNAPSHOT
[facility]    [address] [address]    Phone:  [phone]                                       Duane E Arola   MRN: [id number]    Department:  [facility]   Date of Visit:  3/16/2018           After Visit Summary Signature Page     I have received my discharge instructions, and my questions have been answered. I have discussed any challenges I see with this plan with the nurse or doctor.    ..........................................................................................................................................  Patient/Patient Representative Signature      ..........................................................................................................................................  Patient Representative Print Name and Relationship to Patient    ..................................................               ................................................  Date                                            Time    ..........................................................................................................................................  Reviewed by Signature/Title    ...................................................              ..............................................  Date                                                            Time

## 2018-03-16 NOTE — ED NOTES
Face to face report given with opportunity to observe patient.    Report given to ARNOLDO Briggs   3/16/2018  7:03 AM

## 2018-03-16 NOTE — ED NOTES
"74 y/o male presents with son with reports of epigastric/chest pain which began at 8pm last night. Patient states symptoms began after eating at [employer] \"and eating what I'm not supposed to.\" Patient locates pain to epigastric region and R side of lower chest. Unable to describe how the pain feels. Rates 5/10. Patient denies nausea at this time, does state he vomited earlier in the night \"and that made it better.\" Also states he had an episode of diarrhea \"which made it feel a bit better.\" Denies shortness of breath. Hx of DM. States \"my sugars have been worse.\" States blood sugar was in the 200s last night. Patient states pain made him unable to sleep.  "

## 2018-03-16 NOTE — ED PROVIDER NOTES
I assumed care at change of shift.  Duane's second troponin is unchanged.  He describes some abdominal tenderness.  Gallbladder ultrasound was performed that showed some gallbladder sludge with normal wall thickness and no pericholecystic fluid.  He is feeling better.  Dr. Sullivan is ordering an outpatient Lexiscan for him will discharge him on Zofran to use as needed and have him return here if not improving in the meantime.  I will be here over the weekend and can see him again.  Certainly return if pain worsens or changes in any way.     Alma Gomez MD  03/16/18 2642

## 2018-03-16 NOTE — ED PROVIDER NOTES
"  History     Chief Complaint   Patient presents with     Chest Pain     epigastric pain since 8pm last night      Patient is a 73 year old male presenting with chest pain.   Chest Pain   Associated symptoms: abdominal pain, nausea and vomiting    Associated symptoms: no cough, no diaphoresis, no dysphagia, no fatigue, no fever, no palpitations and no shortness of breath      Duane E Arola is a 73 year old male who presents with concern of epigastric pain Patient states symptoms started at 8pm last night . Patient developed several episodes of gastric regurgitation , emesis . Also developed mid epigastric pain . Describes pain as vague . Could nt' sleep but only describes as ' not a bad pain \" . This morning starting to have chills and wondering if he may have the flu.  Slight cough with episode of emesis . NO  Shortness of breath . NO diaphoresis . NO fever.  NO PND . NO orthopnea. NO history of CAD .     Problem List:    Patient Active Problem List    Diagnosis Date Noted     Cellulitis of leg 06/30/2013     Priority: High     ACP (advance care planning) 03/15/2017     Priority: Medium     Advance Care Planning 3/15/2017: ACP Review of Chart / Resources Provided:  Reviewed chart for advance care plan.  Duane E Arola has no plan or code status on file. Discussed available resources and patient declined information.Juliet DAPHNEY [place]               Fibula fracture 06/30/2013     Priority: Medium     Diabetes mellitus, type 2 (H) 06/30/2013     Priority: Medium     Cellulitis 06/30/2013     Priority: Medium        Past Medical History:    No past medical history on file.    Past Surgical History:    Past Surgical History:   Procedure Laterality Date     COLONOSCOPY N/A 1/26/2018    Procedure: COLONOSCOPY;  COLONOSCOPY;  Surgeon: Arnel Childers MD;  Location: [facility]     COLONOSCOPY - HIM SCAN  06/2012    [facility]     COLONOSCOPY - HIM SCAN  06/01/2006    Colonoscopy, Dr Marquez, [facility] 6/2006     PHACOEMULSIFICATION WITH " STANDARD INTRAOCULAR LENS IMPLANT Right 3/22/2016    Procedure: PHACOEMULSIFICATION WITH STANDARD INTRAOCULAR LENS IMPLANT;  Surgeon: Ramses Mcnulty MD;  Location: [facility]     PHACOEMULSIFICATION WITH STANDARD INTRAOCULAR LENS IMPLANT Left 11/2/2017    Procedure: PHACOEMULSIFICATION WITH STANDARD INTRAOCULAR LENS IMPLANT;  CATARACT EXTRACTION LEFT EYE WITH IMPLANT;  Surgeon: Severiano Martin MD;  Location: [facility]       Family History:    No family history on file.    Social History:  Marital Status:   [5]  Social History   Substance Use Topics     Smoking status: Never Smoker     Smokeless tobacco: Not on file     Alcohol use No        Medications:      Cranberry 1000 MG CAPS   Pioglitazone HCl-Glimepiride 30-2 MG TABS   insulin degludec (TRESIBA FLEXTOUCH) 200 UNIT/ML pen   aloe vera GEL   insulin pen needle (TOPCARE CLICKFINE PEN NEEDLES) 31G X 6 MM   SIMVASTATIN PO   insulin pen needle (BD CK U/F) 32G X 4 MM   ASPIRIN PO   garlic 150 MG TABS   Multiple Vitamins-Minerals (CENTRUM SILVER) per tablet   hypromellose (ARTIFICIAL TEARS) 0.5 % SOLN ophthalmic solution         Review of Systems   Constitutional: Positive for chills. Negative for activity change, appetite change, diaphoresis, fatigue, fever and unexpected weight change.   HENT: Negative.  Negative for congestion, dental problem, drooling, ear discharge, ear pain, facial swelling, hearing loss, mouth sores, nosebleeds, postnasal drip, rhinorrhea, sinus pain, sinus pressure, sneezing, sore throat, tinnitus, trouble swallowing and voice change.    Eyes: Negative.    Respiratory: Negative for apnea, cough, choking, chest tightness, shortness of breath, wheezing and stridor.    Cardiovascular: Positive for chest pain. Negative for palpitations and leg swelling.   Gastrointestinal: Positive for abdominal pain, nausea and vomiting. Negative for abdominal distention, anal bleeding, blood in stool, constipation, diarrhea and rectal pain.   Genitourinary:  Negative.    Musculoskeletal: Negative.    Skin: Negative.    Neurological: Negative.    Hematological: Negative.    Psychiatric/Behavioral: Negative.    All other systems reviewed and are negative.      Physical Exam   BP: 166/80  Heart Rate: 71  Temp: 98.1  F (36.7  C)  Resp: 18  Weight: 113.4 kg (250 lb)  SpO2: 98 %      Physical Exam   Constitutional: He appears well-developed and well-nourished. No distress.   HENT:   Head: Normocephalic and atraumatic.   Right Ear: External ear normal.   Left Ear: External ear normal.   Nose: Nose normal.   Mouth/Throat: No oropharyngeal exudate.   Eyes: Pupils are equal, round, and reactive to light. Right eye exhibits no discharge. Left eye exhibits no discharge. No scleral icterus.   Neck: Normal range of motion. Neck supple. No JVD present. No tracheal deviation present. No thyromegaly present.   Cardiovascular: Normal rate and regular rhythm.    Pulmonary/Chest: No stridor.   Lymphadenopathy:     He has no cervical adenopathy.   Skin: He is not diaphoretic.   Nursing note and vitals reviewed.      ED Course     ED Course     Procedures           EKG No acute findings.   Patient arrived to ER with complaint of chest pain starting 6 hours previously . Patient triaged to exam room. No significant abnormalities on initial vital signs. Cardiac monitors placed. EKG obtained. NO acute ST/T wave changes. Medical records reviewed History and exam complete. Labs and diagnostics ordered. Initial labs significant for borderline troponin at .034 . Repeat troponin ordered. Patient without change in pain with nitro and aspirin  Exam also suspicious for possible gall bladder pathology . At this time patient care is signed out to DR bonnie Gomez            No results found for this or any previous visit (from the past 24 hour(s)).    Medications - No data to display    Assessments & Plan (with Medical Decision Making)     I have reviewed the nursing notes.    I have reviewed the findings,  diagnosis, plan and need for follow up with the patient.      New Prescriptions    No medications on file       Final diagnoses:   Gallbladder sludge   Abdominal pain, generalized       3/16/2018   [facility]     Gena Talavera MD  03/18/18 0048

## 2018-03-16 NOTE — ED NOTES
Discharge instructions completed with patient with no questions or concerns. Vital signs stable. Patient to follow up with general surgery. Aware of Rx to .

## 2018-03-16 NOTE — DISCHARGE INSTRUCTIONS
Duane,    Your labs and X-rays were normal.  Your gallbladder has some debris in it.  I have made a referral to general surgery and they will be contacting you for an appointment.  Nancy has been e-scribed to [facility] pharmacy at [facility].

## 2018-03-27 ENCOUNTER — OFFICE VISIT (OUTPATIENT)
Dept: SURGERY | Facility: OTHER | Age: 73
End: 2018-03-27
Attending: FAMILY MEDICINE
Payer: COMMERCIAL

## 2018-03-27 ENCOUNTER — HOSPITAL ENCOUNTER (OUTPATIENT)
Facility: HOSPITAL | Age: 73
End: 2018-03-27
Attending: SURGERY | Admitting: SURGERY
Payer: COMMERCIAL

## 2018-03-27 VITALS
OXYGEN SATURATION: 94 % | TEMPERATURE: 97.1 F | HEIGHT: 69 IN | HEART RATE: 95 BPM | SYSTOLIC BLOOD PRESSURE: 136 MMHG | WEIGHT: 249 LBS | DIASTOLIC BLOOD PRESSURE: 78 MMHG | BODY MASS INDEX: 36.88 KG/M2

## 2018-03-27 DIAGNOSIS — K83.8 BILIARY SLUDGE DETERMINED BY ULTRASOUND: Primary | ICD-10-CM

## 2018-03-27 PROCEDURE — G0463 HOSPITAL OUTPT CLINIC VISIT: HCPCS

## 2018-03-27 PROCEDURE — 99203 OFFICE O/P NEW LOW 30 MIN: CPT | Performed by: SURGERY

## 2018-03-27 ASSESSMENT — PAIN SCALES - GENERAL: PAINLEVEL: NO PAIN (0)

## 2018-03-27 NOTE — NURSING NOTE
"Chief Complaint   Patient presents with     Consult     Gallbladder/Patricia referring        Initial /82 (BP Location: Right arm, Patient Position: Chair, Cuff Size: Adult Large)  Pulse 95  Temp 97.1  F (36.2  C) (Tympanic)  Ht 5' 9\" (1.753 m)  Wt 249 lb (112.9 kg)  SpO2 94%  BMI 36.77 kg/m2 Estimated body mass index is 36.77 kg/(m^2) as calculated from the following:    Height as of this encounter: 5' 9\" (1.753 m).    Weight as of this encounter: 249 lb (112.9 kg).  Medication Reconciliation: complete     Bhargavi Iqbal LPN           "

## 2018-03-27 NOTE — MR AVS SNAPSHOT
After Visit Summary   3/27/2018    Duane E Arola    MRN: [id number]           Patient Information     Date Of Birth          1945        Visit Information        Provider Department      3/27/2018 1:30 PM Gildardo Shelley MD [facility] [place]        Today's Diagnoses     Biliary sludge determined by ultrasound    -  1      Care Instructions    Thank you for allowing Dr. Shelley and our surgical team to participate in your care.  If you have a scheduling or an appointment question please contact our Health Unit Coordinator, Iris,  at her direct line [phone].   ALL nursing questions or concerns can be directed to your surgical nurse at: [phone]    You are scheduled for a: laparoscopic cholecystectomy   Your procedure date is: 4/23/18        HOW TO PREPARE-        You need a friend or family member available to drive you home AND stay with you for 24 hours after you leave the hospital. You will not be allowed to drive yourself. IF you need to take a taxi or the bus you MUST have a responsible person to ride with you. YOUR PROCEDURE WILL BE CANCELLED IF YOU DO NOT HAVE A RESPONSIBLE ADULT TO DRIVE YOU HOME.       You CANNOT have anything to eat or drink after midnight the night before your surgery, ncluding water and coffee. Your stomach needs to be completely empty. Do NOT chew gum, suck on hard candy, or smoke. You can brush your teeth the morning of surgery.       You need to call our Surgery Education Nurses 1-2 weeks prior to your surgery date at  [phone] or toll free [phone]. Please have you medication and allergy lists ready.      Stop your aspirin or other NSAIDs(Ibuprofen, Motrin, Aleve, Celebrex, Naproxen, etc...) 7 days before your surgery.      Hospital admitting will call you the day before your surgery with your arrival time. If you are scheduled on a Monday admitting will call you the Friday before.      Please call your primary care physician if you  should become ill within 24 hours of scheduled surgery. (ex.vomiting, diarrhea, fever)          You will need to wash the night before AND the morning of you procedure with the supplied Hibiclens. Wash your Surgical area with your bare hands, apply friction and rinse. KEEP IT AWAY FROM YOUR EYES, EARS, NOSE AND MOUTH.             Follow-ups after your visit        Your next 10 appointments already scheduled     Apr 03, 2018  7:00 AM CDT   (Arrive by 6:45 AM)   NM INJECTION with [id number]   [facility] ([facility] )    [address] [phone] [place] [address]            Apr 03, 2018  8:15 AM CDT   (Arrive by 8:00 AM)   NM SCAN3 with HINM1   [facility] ([facility] )    [address] [place], [place] [phone] [place] [address]            Apr 03, 2018  9:00 AM CDT   (Arrive by 8:45 AM)   Ekg Stress Nm Lexiscan 30m with HIXRRN, HI STRESS RM1   [facility] XRAY [facility] ([facility] )    [address] [place] [phone] [place], [place] [id number]            Apr 03, 2018 10:15 AM CDT   (Arrive by 10:00 AM)   NM MPI WITH LEXISCAN with HINM1   [facility] ([facility] )    [address] [place] [phone] [place] [address]           For a ONE day exam: Allow 3-4 hours for test. For a TWO day exam: Allow  minutes PER day for test.  On the day of your resting scan: Please stop eating 3 hours before the test. You may drink water or juice.  On the day of your stress test: Stop all caffeine 12 hours before the test. This includes coffee, tea, soda pop, chocolate and certain medicines (such as Anacin, Excedrin and NoDoz). Also avoid decaf coffee and tea, as these contain small amounts of caffeine.  Stop eating 3 hours before the test. You may drink water.  You may need to stop some medicines before the test. Follow your doctor s orders. - If you take a beta blocker: Do not take your beta-blocker on the day before your test, unless  "specifically told to by your doctor. And do not take it on the day of your test. Bring it with you to take after the test. - If you take Aggrenox or dipyridamole (Persantine, Permole), stop taking it 48 hours before your test. - If you take Viagra, Cialis or Levitra, stop taking it 48 hours before your test. - If you take theophylline or aminophylline, stop taking it 12 hours before your test.  Do not take nitrates on the day of your test. Do not wear your Nitro-Patch.  Please wear a loose two-piece outfit. If you will have an exercise test, bring rubber-soled walking shoes.  When you arrive, please tell us if you have a pacemaker or ICD (implantable defibrillator).  Please call your Imaging Department at your exam site with any questions.              Future tests that were ordered for you today     Open Future Orders        Priority Expected Expires Ordered    NM Hepatobiliary Scan w GB EF Routine  3/27/2019 3/27/2018            Who to contact     If you have questions or need follow up information about today's clinic visit or your schedule please contact [facility] directly at [phone].  Normal or non-critical lab and imaging results will be communicated to you by MyChart, letter or phone within 4 business days after the clinic has received the results. If you do not hear from us within 7 days, please contact the clinic through Q.branchhart or phone. If you have a critical or abnormal lab result, we will notify you by phone as soon as possible.  Submit refill requests through Luminal or call your pharmacy and they will forward the refill request to us. Please allow 3 business days for your refill to be completed.          Additional Information About Your Visit        Q.branchhart Information     Luminal lets you send messages to your doctor, view your test results, renew your prescriptions, schedule appointments and more. To sign up, go to [email or web address] . Click on \"Log in\" on the left side of " "the screen, which will take you to the Welcome page. Then click on \"Sign up Now\" on the right side of the page.     You will be asked to enter the access code listed below, as well as some personal information. Please follow the directions to create your username and password.     Your access code is: [email or web address]  Expires: 2018 10:53 AM     Your access code will  in 90 days. If you need help or a new code, please call your [facility] or [phone].        Care EveryWhere ID     This is your Care EveryWhere ID. This could be used by other organizations to access your [place] medical records  [id number]        Your Vitals Were     Pulse Temperature Height Pulse Oximetry BMI (Body Mass Index)       95 97.1  F (36.2  C) (Tympanic) 5' 9\" (1.753 m) 94% 36.77 kg/m2        Blood Pressure from Last 3 Encounters:   18 136/78   18 138/86   18 130/82    Weight from Last 3 Encounters:   18 249 lb (112.9 kg)   18 250 lb (113.4 kg)   18 267 lb (121.1 kg)                 Today's Medication Changes          These changes are accurate as of 3/27/18  1:52 PM.  If you have any questions, ask your nurse or doctor.               These medicines have changed or have updated prescriptions.        Dose/Directions    insulin degludec 200 UNIT/ML pen   Commonly known as:  TRESIBA FLEXTOUCH   This may have changed:  how much to take   Used for:  Type 2 diabetes mellitus with hyperglycemia, with long-term current use of insulin (H)        Dose:  110 Units   Inject 110 Units Subcutaneous daily   Quantity:  15 mL   Refills:  3                Primary Care Provider Office Phone # Fax #    Federico Sullivan [phone]       [facility] [address] [facility] [address]        Equal Access to Services     MATT RIOS AH: Piedad Murray, waaxda luqadaha, qaybta kaalmanithin thompson . So wa " [phone].    ATENCIÓN: Si shayy lee, tiene a bell disposición servicios gratuitos de asistencia lingüística. Petrona clay [phone].    We comply with applicable federal civil rights laws and [place] laws. We do not discriminate on the basis of race, color, national origin, age, disability, sex, sexual orientation, or gender identity.            Thank you!     Thank you for choosing [facility]  for your care. Our goal is always to provide you with excellent care. Hearing back from our patients is one way we can continue to improve our services. Please take a few minutes to complete the written survey that you may receive in the mail after your visit with us. Thank you!             Your Updated Medication List - Protect others around you: Learn how to safely use, store and throw away your medicines at www.disposemymeds.org.          This list is accurate as of 3/27/18  1:52 PM.  Always use your most recent med list.                   Brand Name Dispense Instructions for use Diagnosis    aloe vera Gel      Uses it in his coffee daily    Type 2 diabetes mellitus with hyperglycemia, with long-term current use of insulin (H)       ASPIRIN PO      Take 162 mg by mouth daily        CENTRUM SILVER per tablet      Take 1 tablet by mouth daily.        Cranberry 1000 MG Caps           garlic 150 MG Tabs tablet      Take 150 mg by mouth daily.        hypromellose 0.5 % Soln ophthalmic solution    ARTIFICIAL TEARS     1 drop every hour as needed for dry eyes        insulin degludec 200 UNIT/ML pen    TRESIBA FLEXTOUCH    15 mL    Inject 110 Units Subcutaneous daily    Type 2 diabetes mellitus with hyperglycemia, with long-term current use of insulin (H)       * insulin pen needle 32G X 4 MM    BD CK U/F    200 each    Use 2 pen needles daily.    Diabetes mellitus, type 2 (H)       * insulin pen needle 31G X 6 MM    [facility] CLICKFINE PEN NEEDLES    100 each    Use pen needles daily or as directed.    Type 2  diabetes mellitus with hyperglycemia (H)       Pioglitazone HCl-Glimepiride 30-2 MG Tabs      Take 1 tablet by mouth daily        SIMVASTATIN PO      Take 20 mg by mouth daily        * Notice:  This list has 2 medication(s) that are the same as other medications prescribed for you. Read the directions carefully, and ask your doctor or other care provider to review them with you.

## 2018-03-27 NOTE — PATIENT INSTRUCTIONS
Thank you for allowing Dr. Shelley and our surgical team to participate in your care.  If you have a scheduling or an appointment question please contact our Health Unit Coordinator, Iris,  at her direct line [phone].   ALL nursing questions or concerns can be directed to your surgical nurse at: [phone]    You are scheduled for a: laparoscopic cholecystectomy   Your procedure date is: 4/23/18        HOW TO PREPARE-        You need a friend or family member available to drive you home AND stay with you for 24 hours after you leave the hospital. You will not be allowed to drive yourself. IF you need to take a taxi or the bus you MUST have a responsible person to ride with you. YOUR PROCEDURE WILL BE CANCELLED IF YOU DO NOT HAVE A RESPONSIBLE ADULT TO DRIVE YOU HOME.       You CANNOT have anything to eat or drink after midnight the night before your surgery, ncluding water and coffee. Your stomach needs to be completely empty. Do NOT chew gum, suck on hard candy, or smoke. You can brush your teeth the morning of surgery.       You need to call our Surgery Education Nurses 1-2 weeks prior to your surgery date at  [phone] or toll free [phone]. Please have you medication and allergy lists ready.      Stop your aspirin or other NSAIDs(Ibuprofen, Motrin, Aleve, Celebrex, Naproxen, etc...) 7 days before your surgery.      Hospital admitting will call you the day before your surgery with your arrival time. If you are scheduled on a Monday admitting will call you the Friday before.      Please call your primary care physician if you should become ill within 24 hours of scheduled surgery. (ex.vomiting, diarrhea, fever)          You will need to wash the night before AND the morning of you procedure with the supplied Hibiclens. Wash your Surgical area with your bare hands, apply friction and rinse. KEEP IT AWAY FROM YOUR EYES, EARS, NOSE AND MOUTH.

## 2018-03-27 NOTE — PROGRESS NOTES
[facility] Surgery Consultation    CC:  Epigastric and Right upper quadrant pain    HPI:  This 73 year old year old male is seen at the request of Alma Gomez for evaluation of gallbladder sludge.  The history is obtained from the patient, and reviewing the medical record.  He is good medical historian. Mr Tellez states that the other week he was in the emergency room for epigastric and right upper quadrant pain. He was seen and evaluated and found to have biliary sludge with no evidence of cholecystitis. During their evaluation there was concern for atypical chest pain. He was worked up with TNIs and an EKG. He has a Lexiscan pending at this time. He says that for the past 40 years he has had some mild pain in his right upper quadrant. He is unsure if it appears to be related to food at all. He has never been worked up for any biliary difficulties or problems in the past. With his ultrasound showing sludge he was referred to general surgery. He has not had any bowel changes or urinary changes. His bilirubin was normal during his ER visit. He says the pain lasted for a few days and resolved on its own. He has never had pain like that before.    No past medical history on file.    Past Surgical History:   Procedure Laterality Date     COLONOSCOPY N/A 1/26/2018    Procedure: COLONOSCOPY;  COLONOSCOPY;  Surgeon: Arnel Childers MD;  Location: [facility]     COLONOSCOPY - HIM SCAN  06/2012    [facility]     COLONOSCOPY - HIM SCAN  06/01/2006    Colonoscopy, Dr Marquez, [facility] 6/2006     PHACOEMULSIFICATION WITH STANDARD INTRAOCULAR LENS IMPLANT Right 3/22/2016    Procedure: PHACOEMULSIFICATION WITH STANDARD INTRAOCULAR LENS IMPLANT;  Surgeon: Ramses Mcnulty MD;  Location: [facility] OR     PHACOEMULSIFICATION WITH STANDARD INTRAOCULAR LENS IMPLANT Left 11/2/2017    Procedure: PHACOEMULSIFICATION WITH STANDARD INTRAOCULAR LENS IMPLANT;  CATARACT EXTRACTION LEFT EYE WITH IMPLANT;  Surgeon: Severiano Martin MD;  Location: [facility] OR       Pt  "denied problems with bleeding or anesthesia    Prior to Admission medications    Medication Sig Start Date End Date Taking? Authorizing Provider   Cranberry 1000 MG CAPS    Yes Reported, Patient   hypromellose (ARTIFICIAL TEARS) 0.5 % SOLN ophthalmic solution 1 drop every hour as needed for dry eyes   Yes Reported, Patient   Pioglitazone HCl-Glimepiride 30-2 MG TABS Take 1 tablet by mouth daily   Yes Reported, Patient   insulin degludec (TRESIBA FLEXTOUCH) 200 UNIT/ML pen Inject 110 Units Subcutaneous daily  Patient taking differently: Inject 120 Units Subcutaneous daily  3/15/17  Yes Dahaina Reyna NP   aloe vera GEL Uses it in his coffee daily 3/15/17  Yes Dahiana Reyna NP   insulin pen needle (TOPCARE CLICKFINE PEN NEEDLES) 31G X 6 MM Use pen needles daily or as directed. 4/7/16  Yes Dahiana Reyna NP   SIMVASTATIN PO Take 20 mg by mouth daily   Yes Reported, Patient   insulin pen needle (BD CK U/F) 32G X 4 MM Use 2 pen needles daily. 3/31/15  Yes Donavan Melgar MD   ASPIRIN PO Take 162 mg by mouth daily    Yes Reported, Patient   garlic 150 MG TABS Take 150 mg by mouth daily.   Yes Reported, Patient   Multiple Vitamins-Minerals (CENTRUM SILVER) per tablet Take 1 tablet by mouth daily.   Yes Reported, Patient          Allergies   Allergen Reactions     Metformin      Legs get weak     Seafood Hives         HABITS:    Social History   Substance Use Topics     Smoking status: Never Smoker     Smokeless tobacco: Never Used     Alcohol use No     No mood altering drug use.    No family history on file.    REVIEW OF SYSTEMS:  Ten point review of systems negative except those mentioned in the HPI.     The patient denies sleep apnea, latex allergies or MRSA    OBJECTIVE:    /78 (BP Location: Right arm, Patient Position: Chair, Cuff Size: Adult Large)  Pulse 95  Temp 97.1  F (36.2  C) (Tympanic)  Ht 5' 9\" (1.753 m)  Wt 249 lb (112.9 kg)  SpO2 94%  BMI 36.77 kg/m2    GENERAL: Generally appears well, " in no distress with appropriate affect.  HEENT:   Sclerae anicteric - No cervical, supra/infraclavicular lymphadenopathy, no thyroid masses  Respiratory:  Lungs clear to ausculation bilaterally with good air excursion  Cardiovascular:  Regular Rate and Rhythm with no murmurs gallops or rubs, normal   Abdomen: obese, soft, non-tender, non-distended  :  deferred  Extremities:  Extremities normal. No deformities, edema, or skin discoloration.  Skin:  no suspicious lesions or rashes  Neurological: grossly intact    Psych:  Alert, oriented, affect appropriate with normal decision making ability.      IMPRESSION:  73 year old male with RUQ/epigastric abdominal pain and biliary sludge    PLAN:  I had a long discussion with the patient regarding his symptoms and how they appear to be biliary in origin. I stated that patients may develop sludge within the gallbladder and never have any symptoms present. As he appears to have symptoms that could be related to his gallbladder I would like to further identify if the gallbladder is functioning appropriately. A HIDA scan with GB ejection fracture will be performed. Once his HIDA and Lexiscan are performed we can then plan for a laparoscopic cholecystectomy. A discussion regarding the procedure was then had. An informed consent was obtained documenting the risks including but not limited to bleeding, infection, damage to surrounding structures. All questions and concerns were addressed.     Thank you for allowing me to participate in the care of your patient.           Gildardo Shelley MD    3/27/2018  2:29 PM    cc:  Alma Cerna

## 2018-04-03 ENCOUNTER — HOSPITAL ENCOUNTER (OUTPATIENT)
Dept: NUCLEAR MEDICINE | Facility: HOSPITAL | Age: 73
Setting detail: NUCLEAR MEDICINE
Discharge: HOME | End: 2018-04-03
Attending: FAMILY MEDICINE | Admitting: FAMILY MEDICINE
Payer: COMMERCIAL

## 2018-04-03 ENCOUNTER — HOSPITAL ENCOUNTER (OUTPATIENT)
Dept: GENERAL RADIOLOGY | Facility: HOSPITAL | Age: 73
Setting detail: NUCLEAR MEDICINE
End: 2018-04-03
Attending: FAMILY MEDICINE
Payer: COMMERCIAL

## 2018-04-03 ENCOUNTER — HOSPITAL ENCOUNTER (OUTPATIENT)
Dept: NUCLEAR MEDICINE | Facility: HOSPITAL | Age: 73
Setting detail: NUCLEAR MEDICINE
End: 2018-04-03
Attending: FAMILY MEDICINE
Payer: COMMERCIAL

## 2018-04-03 DIAGNOSIS — R94.31 ABNORMAL EKG: ICD-10-CM

## 2018-04-03 DIAGNOSIS — R07.89 ATYPICAL CHEST PAIN: ICD-10-CM

## 2018-04-03 PROCEDURE — 93016 CV STRESS TEST SUPVJ ONLY: CPT | Performed by: INTERNAL MEDICINE

## 2018-04-03 PROCEDURE — 25000128 H RX IP 250 OP 636: Performed by: INTERNAL MEDICINE

## 2018-04-03 PROCEDURE — 34300033 ZZH RX 343: Performed by: RADIOLOGY

## 2018-04-03 PROCEDURE — 78452 HT MUSCLE IMAGE SPECT MULT: CPT | Mod: TC

## 2018-04-03 PROCEDURE — 93017 CV STRESS TEST TRACING ONLY: CPT

## 2018-04-03 PROCEDURE — A9500 TC99M SESTAMIBI: HCPCS | Performed by: RADIOLOGY

## 2018-04-03 PROCEDURE — 93018 CV STRESS TEST I&R ONLY: CPT | Performed by: INTERNAL MEDICINE

## 2018-04-03 RX ORDER — REGADENOSON 0.08 MG/ML
0.4 INJECTION, SOLUTION INTRAVENOUS ONCE
Status: COMPLETED | OUTPATIENT
Start: 2018-04-03 | End: 2018-04-03

## 2018-04-03 RX ORDER — AMINOPHYLLINE 25 MG/ML
INJECTION, SOLUTION INTRAVENOUS
Status: DISCONTINUED
Start: 2018-04-03 | End: 2018-04-03 | Stop reason: WASHOUT

## 2018-04-03 RX ADMIN — REGADENOSON 0.4 MG: 0.08 INJECTION, SOLUTION INTRAVENOUS at 09:15

## 2018-04-03 RX ADMIN — Medication 30 MILLICURIE: at 09:35

## 2018-04-03 RX ADMIN — Medication 10 MILLICURIE: at 07:15

## 2018-04-06 ENCOUNTER — TELEPHONE (OUTPATIENT)
Dept: SURGERY | Facility: OTHER | Age: 73
End: 2018-04-06

## 2018-04-06 NOTE — TELEPHONE ENCOUNTER
Patient called and has questions regarding his surgery that is scheduled with Dr Shelley on 4-23-18. Please call patient back at [phone] or [phone]. Thanks.

## 2018-04-09 ENCOUNTER — HOSPITAL ENCOUNTER (OUTPATIENT)
Dept: NUCLEAR MEDICINE | Facility: HOSPITAL | Age: 73
Discharge: HOME | End: 2018-04-09
Attending: SURGERY | Admitting: SURGERY
Payer: COMMERCIAL

## 2018-04-09 ENCOUNTER — HOSPITAL ENCOUNTER (OUTPATIENT)
Dept: NUCLEAR MEDICINE | Facility: HOSPITAL | Age: 73
End: 2018-04-09
Attending: SURGERY
Payer: COMMERCIAL

## 2018-04-09 DIAGNOSIS — K83.8 BILIARY SLUDGE DETERMINED BY ULTRASOUND: ICD-10-CM

## 2018-04-09 PROCEDURE — 78226 HEPATOBILIARY SYSTEM IMAGING: CPT | Mod: TC

## 2018-04-09 PROCEDURE — 34300033 ZZH RX 343: Performed by: RADIOLOGY

## 2018-04-09 PROCEDURE — A9537 TC99M MEBROFENIN: HCPCS | Performed by: RADIOLOGY

## 2018-04-09 RX ORDER — KIT FOR THE PREPARATION OF TECHNETIUM TC 99M MEBROFENIN 45 MG/10ML
5 INJECTION, POWDER, LYOPHILIZED, FOR SOLUTION INTRAVENOUS ONCE
Status: COMPLETED | OUTPATIENT
Start: 2018-04-09 | End: 2018-04-09

## 2018-04-09 RX ADMIN — MEBROFENIN 5 MILLICURIE: 45 INJECTION, POWDER, LYOPHILIZED, FOR SOLUTION INTRAVENOUS at 12:04

## 2018-04-09 NOTE — H&P (VIEW-ONLY)
[facility] Surgery Consultation    CC:  Epigastric and Right upper quadrant pain    HPI:  This 73 year old year old male is seen at the request of Alma Gomez for evaluation of gallbladder sludge.  The history is obtained from the patient, and reviewing the medical record.  He is good medical historian. Mr Tellez states that the other week he was in the emergency room for epigastric and right upper quadrant pain. He was seen and evaluated and found to have biliary sludge with no evidence of cholecystitis. During their evaluation there was concern for atypical chest pain. He was worked up with TNIs and an EKG. He has a Lexiscan pending at this time. He says that for the past 40 years he has had some mild pain in his right upper quadrant. He is unsure if it appears to be related to food at all. He has never been worked up for any biliary difficulties or problems in the past. With his ultrasound showing sludge he was referred to general surgery. He has not had any bowel changes or urinary changes. His bilirubin was normal during his ER visit. He says the pain lasted for a few days and resolved on its own. He has never had pain like that before.    No past medical history on file.    Past Surgical History:   Procedure Laterality Date     COLONOSCOPY N/A 1/26/2018    Procedure: COLONOSCOPY;  COLONOSCOPY;  Surgeon: Arnel Childers MD;  Location: [facility]     COLONOSCOPY - HIM SCAN  06/2012    [facility]     COLONOSCOPY - HIM SCAN  06/01/2006    Colonoscopy, Dr Marquez, [facility] 6/2006     PHACOEMULSIFICATION WITH STANDARD INTRAOCULAR LENS IMPLANT Right 3/22/2016    Procedure: PHACOEMULSIFICATION WITH STANDARD INTRAOCULAR LENS IMPLANT;  Surgeon: Ramses Mcnulty MD;  Location: [facility] OR     PHACOEMULSIFICATION WITH STANDARD INTRAOCULAR LENS IMPLANT Left 11/2/2017    Procedure: PHACOEMULSIFICATION WITH STANDARD INTRAOCULAR LENS IMPLANT;  CATARACT EXTRACTION LEFT EYE WITH IMPLANT;  Surgeon: Severiano Martin MD;  Location: [facility] OR       Pt  "denied problems with bleeding or anesthesia    Prior to Admission medications    Medication Sig Start Date End Date Taking? Authorizing Provider   Cranberry 1000 MG CAPS    Yes Reported, Patient   hypromellose (ARTIFICIAL TEARS) 0.5 % SOLN ophthalmic solution 1 drop every hour as needed for dry eyes   Yes Reported, Patient   Pioglitazone HCl-Glimepiride 30-2 MG TABS Take 1 tablet by mouth daily   Yes Reported, Patient   insulin degludec (TRESIBA FLEXTOUCH) 200 UNIT/ML pen Inject 110 Units Subcutaneous daily  Patient taking differently: Inject 120 Units Subcutaneous daily  3/15/17  Yes Dahiana Reyna NP   aloe vera GEL Uses it in his coffee daily 3/15/17  Yes Dahiana Reyna NP   insulin pen needle (TOPCARE CLICKFINE PEN NEEDLES) 31G X 6 MM Use pen needles daily or as directed. 4/7/16  Yes Dahiana Reyna NP   SIMVASTATIN PO Take 20 mg by mouth daily   Yes Reported, Patient   insulin pen needle (BD CK U/F) 32G X 4 MM Use 2 pen needles daily. 3/31/15  Yes Donavan Melgar MD   ASPIRIN PO Take 162 mg by mouth daily    Yes Reported, Patient   garlic 150 MG TABS Take 150 mg by mouth daily.   Yes Reported, Patient   Multiple Vitamins-Minerals (CENTRUM SILVER) per tablet Take 1 tablet by mouth daily.   Yes Reported, Patient          Allergies   Allergen Reactions     Metformin      Legs get weak     Seafood Hives         HABITS:    Social History   Substance Use Topics     Smoking status: Never Smoker     Smokeless tobacco: Never Used     Alcohol use No     No mood altering drug use.    No family history on file.    REVIEW OF SYSTEMS:  Ten point review of systems negative except those mentioned in the HPI.     The patient denies sleep apnea, latex allergies or MRSA    OBJECTIVE:    /78 (BP Location: Right arm, Patient Position: Chair, Cuff Size: Adult Large)  Pulse 95  Temp 97.1  F (36.2  C) (Tympanic)  Ht 5' 9\" (1.753 m)  Wt 249 lb (112.9 kg)  SpO2 94%  BMI 36.77 kg/m2    GENERAL: Generally appears well, " in no distress with appropriate affect.  HEENT:   Sclerae anicteric - No cervical, supra/infraclavicular lymphadenopathy, no thyroid masses  Respiratory:  Lungs clear to ausculation bilaterally with good air excursion  Cardiovascular:  Regular Rate and Rhythm with no murmurs gallops or rubs, normal   Abdomen: obese, soft, non-tender, non-distended  :  deferred  Extremities:  Extremities normal. No deformities, edema, or skin discoloration.  Skin:  no suspicious lesions or rashes  Neurological: grossly intact    Psych:  Alert, oriented, affect appropriate with normal decision making ability.      IMPRESSION:  73 year old male with RUQ/epigastric abdominal pain and biliary sludge    PLAN:  I had a long discussion with the patient regarding his symptoms and how they appear to be biliary in origin. I stated that patients may develop sludge within the gallbladder and never have any symptoms present. As he appears to have symptoms that could be related to his gallbladder I would like to further identify if the gallbladder is functioning appropriately. A HIDA scan with GB ejection fracture will be performed. Once his HIDA and Lexiscan are performed we can then plan for a laparoscopic cholecystectomy. A discussion regarding the procedure was then had. An informed consent was obtained documenting the risks including but not limited to bleeding, infection, damage to surrounding structures. All questions and concerns were addressed.     Thank you for allowing me to participate in the care of your patient.           Gildardo Shelley MD    3/27/2018  2:29 PM    cc:  Alma Cerna

## 2018-04-10 NOTE — TELEPHONE ENCOUNTER
Writer called patient back.  Patient stated he is getting a second opinion on 04/12/2018 and will call back to let us know if he needs to cancel or not.  Patient stated he was told he is high risk because of age and being a diabetic and would feel more comfortable getting a second opinion.

## 2018-04-13 ENCOUNTER — TELEPHONE (OUTPATIENT)
Dept: SURGERY | Facility: OTHER | Age: 73
End: 2018-04-13

## 2018-04-13 NOTE — TELEPHONE ENCOUNTER
Patient left VM on writers phone and would like to cancel his surgery with Dr Shelley on 4-23-18. Please call patient back at [phone], thanks.

## 2018-04-13 NOTE — TELEPHONE ENCOUNTER
Writer spoke with patient and he is seeing [facility] for other issues he is having, so he is having his gallbladder removed during another procedure he is having done down at [facility]. OR notified of cancellation and no reschedule necessary.    Sarah Beth Escobar

## 2018-06-09 ENCOUNTER — APPOINTMENT (OUTPATIENT)
Dept: ULTRASOUND IMAGING | Facility: HOSPITAL | Age: 73
DRG: 603 | End: 2018-06-09
Attending: FAMILY MEDICINE
Payer: COMMERCIAL

## 2018-06-09 ENCOUNTER — HOSPITAL ENCOUNTER (INPATIENT)
Facility: HOSPITAL | Age: 73
LOS: 4 days | Discharge: HOME | DRG: 603 | End: 2018-06-13
Attending: FAMILY MEDICINE | Admitting: HOSPITALIST
Payer: COMMERCIAL

## 2018-06-09 DIAGNOSIS — L03.115 CELLULITIS OF RIGHT LOWER EXTREMITY: ICD-10-CM

## 2018-06-09 LAB
ALBUMIN SERPL-MCNC: 3.3 G/DL (ref 3.4–5)
ALP SERPL-CCNC: 80 U/L (ref 40–150)
ALT SERPL W P-5'-P-CCNC: 38 U/L (ref 0–70)
ANION GAP SERPL CALCULATED.3IONS-SCNC: 7 MMOL/L (ref 3–14)
AST SERPL W P-5'-P-CCNC: 28 U/L (ref 0–45)
BASOPHILS # BLD AUTO: 0.1 10E9/L (ref 0–0.2)
BASOPHILS NFR BLD AUTO: 0.6 %
BILIRUB SERPL-MCNC: 0.4 MG/DL (ref 0.2–1.3)
BUN SERPL-MCNC: 23 MG/DL (ref 7–30)
CALCIUM SERPL-MCNC: 8.8 MG/DL (ref 8.5–10.1)
CHLORIDE SERPL-SCNC: 103 MMOL/L (ref 94–109)
CO2 SERPL-SCNC: 28 MMOL/L (ref 20–32)
CREAT SERPL-MCNC: 0.98 MG/DL (ref 0.66–1.25)
DIFFERENTIAL METHOD BLD: ABNORMAL
EOSINOPHIL # BLD AUTO: 0.1 10E9/L (ref 0–0.7)
EOSINOPHIL NFR BLD AUTO: 0.8 %
ERYTHROCYTE [DISTWIDTH] IN BLOOD BY AUTOMATED COUNT: 13.4 % (ref 10–15)
EST. AVERAGE GLUCOSE BLD GHB EST-MCNC: 151 MG/DL
GFR SERPL CREATININE-BSD FRML MDRD: 75 ML/MIN/1.7M2
GLUCOSE BLDC GLUCOMTR-MCNC: 80 MG/DL (ref 70–99)
GLUCOSE SERPL-MCNC: 142 MG/DL (ref 70–99)
HBA1C MFR BLD: 6.9 % (ref 0–5.6)
HCT VFR BLD AUTO: 33.9 % (ref 40–53)
HGB BLD-MCNC: 11.4 G/DL (ref 13.3–17.7)
IMM GRANULOCYTES # BLD: 0.1 10E9/L (ref 0–0.4)
IMM GRANULOCYTES NFR BLD: 0.6 %
LACTATE SERPL-SCNC: 1.2 MMOL/L (ref 0.4–2)
LYMPHOCYTES # BLD AUTO: 2.2 10E9/L (ref 0.8–5.3)
LYMPHOCYTES NFR BLD AUTO: 15.5 %
MCH RBC QN AUTO: 30.4 PG (ref 26.5–33)
MCHC RBC AUTO-ENTMCNC: 33.6 G/DL (ref 31.5–36.5)
MCV RBC AUTO: 90 FL (ref 78–100)
MONOCYTES # BLD AUTO: 1.5 10E9/L (ref 0–1.3)
MONOCYTES NFR BLD AUTO: 10.1 %
NEUTROPHILS # BLD AUTO: 10.5 10E9/L (ref 1.6–8.3)
NEUTROPHILS NFR BLD AUTO: 72.4 %
NRBC # BLD AUTO: 0 10*3/UL
NRBC BLD AUTO-RTO: 0 /100
PLATELET # BLD AUTO: 375 10E9/L (ref 150–450)
POTASSIUM SERPL-SCNC: 4.4 MMOL/L (ref 3.4–5.3)
PROT SERPL-MCNC: 8.8 G/DL (ref 6.8–8.8)
RBC # BLD AUTO: 3.75 10E12/L (ref 4.4–5.9)
SODIUM SERPL-SCNC: 138 MMOL/L (ref 133–144)
WBC # BLD AUTO: 14.5 10E9/L (ref 4–11)

## 2018-06-09 PROCEDURE — 83036 HEMOGLOBIN GLYCOSYLATED A1C: CPT | Performed by: HOSPITALIST

## 2018-06-09 PROCEDURE — 96367 TX/PROPH/DG ADDL SEQ IV INF: CPT

## 2018-06-09 PROCEDURE — 96375 TX/PRO/DX INJ NEW DRUG ADDON: CPT

## 2018-06-09 PROCEDURE — 25000128 H RX IP 250 OP 636: Performed by: HOSPITALIST

## 2018-06-09 PROCEDURE — 99285 EMERGENCY DEPT VISIT HI MDM: CPT | Mod: 25

## 2018-06-09 PROCEDURE — 25000128 H RX IP 250 OP 636: Performed by: FAMILY MEDICINE

## 2018-06-09 PROCEDURE — 85025 COMPLETE CBC W/AUTO DIFF WBC: CPT | Performed by: FAMILY MEDICINE

## 2018-06-09 PROCEDURE — 36415 COLL VENOUS BLD VENIPUNCTURE: CPT | Performed by: FAMILY MEDICINE

## 2018-06-09 PROCEDURE — 40000788 ZZHCL STATISTIC ESTIMATED AVERAGE GLUCOSE: Performed by: HOSPITALIST

## 2018-06-09 PROCEDURE — 40000786 ZZHCL STATISTIC ACTIVE MRSA SURVEILLANCE CULTURE: Performed by: HOSPITALIST

## 2018-06-09 PROCEDURE — 99285 EMERGENCY DEPT VISIT HI MDM: CPT | Mod: Z6 | Performed by: FAMILY MEDICINE

## 2018-06-09 PROCEDURE — 00000146 ZZHCL STATISTIC GLUCOSE BY METER IP

## 2018-06-09 PROCEDURE — 87040 BLOOD CULTURE FOR BACTERIA: CPT | Performed by: FAMILY MEDICINE

## 2018-06-09 PROCEDURE — 25000132 ZZH RX MED GY IP 250 OP 250 PS 637: Performed by: HOSPITALIST

## 2018-06-09 PROCEDURE — 25000128 H RX IP 250 OP 636

## 2018-06-09 PROCEDURE — 93971 EXTREMITY STUDY: CPT | Mod: TC,RT

## 2018-06-09 PROCEDURE — 12000000 ZZH R&B MED SURG/OB

## 2018-06-09 PROCEDURE — 80053 COMPREHEN METABOLIC PANEL: CPT | Performed by: FAMILY MEDICINE

## 2018-06-09 PROCEDURE — 96365 THER/PROPH/DIAG IV INF INIT: CPT

## 2018-06-09 PROCEDURE — 99223 1ST HOSP IP/OBS HIGH 75: CPT | Mod: AI | Performed by: HOSPITALIST

## 2018-06-09 PROCEDURE — 83605 ASSAY OF LACTIC ACID: CPT | Performed by: FAMILY MEDICINE

## 2018-06-09 RX ORDER — NICOTINE POLACRILEX 4 MG
15-30 LOZENGE BUCCAL
Status: DISCONTINUED | OUTPATIENT
Start: 2018-06-09 | End: 2018-06-13 | Stop reason: HOSPADM

## 2018-06-09 RX ORDER — CEFEPIME HYDROCHLORIDE 1 G/1
INJECTION, POWDER, FOR SOLUTION INTRAMUSCULAR; INTRAVENOUS
Status: COMPLETED
Start: 2018-06-09 | End: 2018-06-09

## 2018-06-09 RX ORDER — NALOXONE HYDROCHLORIDE 0.4 MG/ML
.1-.4 INJECTION, SOLUTION INTRAMUSCULAR; INTRAVENOUS; SUBCUTANEOUS
Status: DISCONTINUED | OUTPATIENT
Start: 2018-06-09 | End: 2018-06-13 | Stop reason: HOSPADM

## 2018-06-09 RX ORDER — ASPIRIN 81 MG/1
162 TABLET, CHEWABLE ORAL EVERY EVENING
Status: DISCONTINUED | OUTPATIENT
Start: 2018-06-09 | End: 2018-06-13 | Stop reason: HOSPADM

## 2018-06-09 RX ORDER — PROCHLORPERAZINE 25 MG
12.5 SUPPOSITORY, RECTAL RECTAL EVERY 12 HOURS PRN
Status: DISCONTINUED | OUTPATIENT
Start: 2018-06-09 | End: 2018-06-13 | Stop reason: HOSPADM

## 2018-06-09 RX ORDER — ONDANSETRON 2 MG/ML
4 INJECTION INTRAMUSCULAR; INTRAVENOUS EVERY 6 HOURS PRN
Status: DISCONTINUED | OUTPATIENT
Start: 2018-06-09 | End: 2018-06-13 | Stop reason: HOSPADM

## 2018-06-09 RX ORDER — DEXTROSE MONOHYDRATE 25 G/50ML
25-50 INJECTION, SOLUTION INTRAVENOUS
Status: DISCONTINUED | OUTPATIENT
Start: 2018-06-09 | End: 2018-06-13 | Stop reason: HOSPADM

## 2018-06-09 RX ORDER — POLYETHYLENE GLYCOL 3350 17 G/17G
17 POWDER, FOR SOLUTION ORAL DAILY PRN
Status: DISCONTINUED | OUTPATIENT
Start: 2018-06-09 | End: 2018-06-13 | Stop reason: HOSPADM

## 2018-06-09 RX ORDER — ONDANSETRON 4 MG/1
4 TABLET, ORALLY DISINTEGRATING ORAL EVERY 6 HOURS PRN
Status: DISCONTINUED | OUTPATIENT
Start: 2018-06-09 | End: 2018-06-13 | Stop reason: HOSPADM

## 2018-06-09 RX ORDER — ACETAMINOPHEN 325 MG/1
650 TABLET ORAL EVERY 4 HOURS PRN
Status: DISCONTINUED | OUTPATIENT
Start: 2018-06-09 | End: 2018-06-13 | Stop reason: HOSPADM

## 2018-06-09 RX ORDER — MORPHINE SULFATE 2 MG/ML
2 INJECTION, SOLUTION INTRAMUSCULAR; INTRAVENOUS ONCE
Status: COMPLETED | OUTPATIENT
Start: 2018-06-09 | End: 2018-06-09

## 2018-06-09 RX ORDER — OXYCODONE HYDROCHLORIDE 5 MG/1
5-10 TABLET ORAL
Status: DISCONTINUED | OUTPATIENT
Start: 2018-06-09 | End: 2018-06-13 | Stop reason: HOSPADM

## 2018-06-09 RX ORDER — SIMVASTATIN 20 MG
20 TABLET ORAL EVERY EVENING
Status: DISCONTINUED | OUTPATIENT
Start: 2018-06-09 | End: 2018-06-13 | Stop reason: HOSPADM

## 2018-06-09 RX ORDER — SODIUM CHLORIDE 9 MG/ML
INJECTION, SOLUTION INTRAVENOUS
Status: DISPENSED
Start: 2018-06-09 | End: 2018-06-10

## 2018-06-09 RX ORDER — PROCHLORPERAZINE MALEATE 5 MG
5 TABLET ORAL EVERY 6 HOURS PRN
Status: DISCONTINUED | OUTPATIENT
Start: 2018-06-09 | End: 2018-06-13 | Stop reason: HOSPADM

## 2018-06-09 RX ORDER — SODIUM CHLORIDE 9 MG/ML
INJECTION, SOLUTION INTRAVENOUS CONTINUOUS
Status: DISCONTINUED | OUTPATIENT
Start: 2018-06-09 | End: 2018-06-10

## 2018-06-09 RX ORDER — HYDROMORPHONE HYDROCHLORIDE 1 MG/ML
.3-.5 INJECTION, SOLUTION INTRAMUSCULAR; INTRAVENOUS; SUBCUTANEOUS
Status: DISCONTINUED | OUTPATIENT
Start: 2018-06-09 | End: 2018-06-13 | Stop reason: HOSPADM

## 2018-06-09 RX ADMIN — ASPIRIN 81 MG 162 MG: 81 TABLET ORAL at 21:33

## 2018-06-09 RX ADMIN — SIMVASTATIN 20 MG: 20 TABLET, FILM COATED ORAL at 21:33

## 2018-06-09 RX ADMIN — VANCOMYCIN HYDROCHLORIDE 2500 MG: 1 INJECTION, POWDER, LYOPHILIZED, FOR SOLUTION INTRAVENOUS at 18:57

## 2018-06-09 RX ADMIN — ENOXAPARIN SODIUM 40 MG: 40 INJECTION SUBCUTANEOUS at 21:33

## 2018-06-09 RX ADMIN — TAZOBACTAM SODIUM AND PIPERACILLIN SODIUM 4.5 G: 500; 4 INJECTION, SOLUTION INTRAVENOUS at 17:54

## 2018-06-09 RX ADMIN — SODIUM CHLORIDE: 9 INJECTION, SOLUTION INTRAVENOUS at 20:48

## 2018-06-09 RX ADMIN — MORPHINE SULFATE 2 MG: 2 INJECTION, SOLUTION INTRAMUSCULAR; INTRAVENOUS at 18:49

## 2018-06-09 RX ADMIN — CEFEPIME HYDROCHLORIDE 1 G: 1 INJECTION, POWDER, FOR SOLUTION INTRAMUSCULAR; INTRAVENOUS at 21:51

## 2018-06-09 ASSESSMENT — ENCOUNTER SYMPTOMS
CONSTIPATION: 0
ABDOMINAL PAIN: 0
FATIGUE: 1
VOMITING: 0
ACTIVITY CHANGE: 1
FEVER: 1
PSYCHIATRIC NEGATIVE: 1
MYALGIAS: 1
NAUSEA: 0
DIAPHORESIS: 0
DYSURIA: 0
COLOR CHANGE: 1
NEUROLOGICAL NEGATIVE: 1
SHORTNESS OF BREATH: 0
DIARRHEA: 0

## 2018-06-09 NOTE — ED PROVIDER NOTES
History     Chief Complaint   Patient presents with     Cellulitis     right leg for 11 days, already on oral antibiotics     HPI  Duane E Arola is a 73 year old male who presents to the emergency room with cellulitis of his right leg that has been going on for 11 days.  He has taken a course of Keflex, this did not help significantly and in fact in the last 2 days the leg ha s been getting redder and the redness has been expanding.  Today his leg is exquisitely tender especially at the medial ankle.  The anterior calf and approximately two thirds of the circumference are deep red in color and there are lesions on the shin area that patient states have been there for years.  They are not open, there is nothing that is draining.  He has not been running a major fever at home, believes he may have had a low-grade fever last night.  He is afebrile on arrival here today.  Vital signs are stable.  Patient is diabetic, blood glucoses have been running 120-170 per the patient's account.    Problem List:    Patient Active Problem List    Diagnosis Date Noted     Cellulitis of leg 06/30/2013     Priority: High     ACP (advance care planning) 03/15/2017     Priority: Medium     Advance Care Planning 3/15/2017: ACP Review of Chart / Resources Provided:  Reviewed chart for advance care plan.  Duane E Arola has no plan or code status on file. Discussed available resources and patient declined information.Juliet DAPHNEY Cyrloo               Fibula fracture 06/30/2013     Priority: Medium     Diabetes mellitus, type 2 (H) 06/30/2013     Priority: Medium     Cellulitis 06/30/2013     Priority: Medium        Past Medical History:    No past medical history on file.    Past Surgical History:    Past Surgical History:   Procedure Laterality Date     COLONOSCOPY N/A 1/26/2018    Procedure: COLONOSCOPY;  COLONOSCOPY;  Surgeon: Arnel Childers MD;  Location: [facility]     COLONOSCOPY - HIM SCAN  06/2012    [facility]     COLONOSCOPY - HIM SCAN   06/01/2006    Colonoscopy, Dr Marquez, [facility] [place]     PHACOEMULSIFICATION WITH STANDARD INTRAOCULAR LENS IMPLANT Right 3/22/2016    Procedure: PHACOEMULSIFICATION WITH STANDARD INTRAOCULAR LENS IMPLANT;  Surgeon: Ramses Mcnulty MD;  Location: [facility]     PHACOEMULSIFICATION WITH STANDARD INTRAOCULAR LENS IMPLANT Left 11/2/2017    Procedure: PHACOEMULSIFICATION WITH STANDARD INTRAOCULAR LENS IMPLANT;  CATARACT EXTRACTION LEFT EYE WITH IMPLANT;  Surgeon: Severiano Martin MD;  Location: [facility]       Family History:    No family history on file.    Social History:  Marital Status:   [5]  Social History   Substance Use Topics     Smoking status: Never Smoker     Smokeless tobacco: Never Used     Alcohol use No        Medications:      ASPIRIN PO   CEPHALEXIN PO   Cranberry 1000 MG CAPS   garlic 150 MG TABS   insulin degludec (TRESIBA FLEXTOUCH) 200 UNIT/ML pen   insulin pen needle (BD CK U/F) 32G X 4 MM   insulin pen needle (TOPCARE CLICKFINE PEN NEEDLES) 31G X 6 MM   Multiple Vitamins-Minerals (CENTRUM SILVER) per tablet   Pioglitazone HCl-Glimepiride 30-2 MG TABS   SIMVASTATIN PO   hypromellose (ARTIFICIAL TEARS) 0.5 % SOLN ophthalmic solution         Review of Systems   Constitutional: Positive for activity change, fatigue and fever. Negative for diaphoresis.   HENT: Negative.    Respiratory: Negative for shortness of breath.    Cardiovascular: Negative for chest pain.   Gastrointestinal: Negative for abdominal pain, constipation, diarrhea, nausea and vomiting.   Genitourinary: Negative for dysuria.   Musculoskeletal: Positive for myalgias.   Skin: Positive for color change.        Erythema of right calf   Neurological: Negative.    Psychiatric/Behavioral: Negative.        Physical Exam   BP: 143/60  Pulse: 90  Heart Rate: 88  Temp: 97  F (36.1  C)  Resp: 19  Weight: 110.7 kg (244 lb)  SpO2: 97 %      Physical Exam   Constitutional: He is oriented to person, place, and time. He appears well-developed and  well-nourished. No distress.   HENT:   Head: Atraumatic.   Neck: Normal range of motion.   Cardiovascular: Normal rate, regular rhythm, normal heart sounds and intact distal pulses.    No murmur heard.  Pulmonary/Chest: Effort normal and breath sounds normal. No respiratory distress.   Abdominal: Soft. Bowel sounds are normal. He exhibits no distension. There is no tenderness.   Musculoskeletal: He exhibits edema and tenderness. He exhibits no deformity.        Right ankle: He exhibits swelling. Tenderness.        Right lower leg: He exhibits tenderness and swelling.        Legs:  Neurological: He is alert and oriented to person, place, and time.   Skin: Skin is warm and dry. There is erythema.   Psychiatric: He has a normal mood and affect.   Nursing note and vitals reviewed.      ED Course     ED Course     Procedures    Results for orders placed or performed during the hospital encounter of 06/09/18 (from the past 24 hour(s))   CBC with platelets differential   Result Value Ref Range    WBC 14.5 (H) 4.0 - 11.0 10e9/L    RBC Count 3.75 (L) 4.4 - 5.9 10e12/L    Hemoglobin 11.4 (L) 13.3 - 17.7 g/dL    Hematocrit 33.9 (L) 40.0 - 53.0 %    MCV 90 78 - 100 fl    MCH 30.4 26.5 - 33.0 pg    MCHC 33.6 31.5 - 36.5 g/dL    RDW 13.4 10.0 - 15.0 %    Platelet Count 375 150 - 450 10e9/L    Diff Method Automated Method     % Neutrophils 72.4 %    % Lymphocytes 15.5 %    % Monocytes 10.1 %    % Eosinophils 0.8 %    % Basophils 0.6 %    % Immature Granulocytes 0.6 %    Nucleated RBCs 0 0 /100    Absolute Neutrophil 10.5 (H) 1.6 - 8.3 10e9/L    Absolute Lymphocytes 2.2 0.8 - 5.3 10e9/L    Absolute Monocytes 1.5 (H) 0.0 - 1.3 10e9/L    Absolute Eosinophils 0.1 0.0 - 0.7 10e9/L    Absolute Basophils 0.1 0.0 - 0.2 10e9/L    Abs Immature Granulocytes 0.1 0 - 0.4 10e9/L    Absolute Nucleated RBC 0.0    Comprehensive metabolic panel   Result Value Ref Range    Sodium 138 133 - 144 mmol/L    Potassium 4.4 3.4 - 5.3 mmol/L    Chloride 103  94 - 109 mmol/L    Carbon Dioxide 28 20 - 32 mmol/L    Anion Gap 7 3 - 14 mmol/L    Glucose 142 (H) 70 - 99 mg/dL    Urea Nitrogen 23 7 - 30 mg/dL    Creatinine 0.98 0.66 - 1.25 mg/dL    GFR Estimate 75 >60 mL/min/1.7m2    GFR Estimate If Black >90 >60 mL/min/1.7m2    Calcium 8.8 8.5 - 10.1 mg/dL    Bilirubin Total 0.4 0.2 - 1.3 mg/dL    Albumin 3.3 (L) 3.4 - 5.0 g/dL    Protein Total 8.8 6.8 - 8.8 g/dL    Alkaline Phosphatase 80 40 - 150 U/L    ALT 38 0 - 70 U/L    AST 28 0 - 45 U/L   Lactic acid   Result Value Ref Range    Lactic Acid 1.2 0.4 - 2.0 mmol/L       Medications   vancomycin (VANCOCIN) 2,500 mg in sodium chloride 0.9 % 500 mL intermittent infusion (not administered)   morphine (PF) injection 2 mg (not administered)   piperacillin-tazobactam (ZOSYN) intermittent infusion 4.5 g (4.5 g Intravenous New Bag 6/9/18 [id number])       Assessments & Plan (with Medical Decision Making)   Patient has failed outpatient antibiotics, will need IV antibiotics for his cellulitis.  He is not septic per lactic acid, however he has a white count of 14.5 and an ANC of 10.5.  Ultrasound of the calf was performed in the emergency room, if results by radiology were pending at the time of transfer to the floor.  Spoke to Dr. Ricketts who accepted the patient for admission to the medical floor.  Patient was given Zosyn and vancomycin while in the emergency room per protocol.  Given morphine for pain prior to ultrasound.    I have reviewed the nursing notes.    I have reviewed the findings, diagnosis, plan and need for follow up with the patient.  New Prescriptions    No medications on file       Final diagnoses:   Cellulitis of right lower extremity       6/9/2018   [facility]     Elida Wise MD  06/09/18 [id number]

## 2018-06-09 NOTE — IP AVS SNAPSHOT
[facility]    [address] [place] [id number]    Phone:  [phone]    Fax:  [phone]                                       After Visit Summary   6/9/2018    Duane E Arola    MRN: [id number]           After Visit Summary Signature Page     I have received my discharge instructions, and my questions have been answered. I have discussed any challenges I see with this plan with the nurse or doctor.    ..........................................................................................................................................  Patient/Patient Representative Signature      ..........................................................................................................................................  Patient Representative Print Name and Relationship to Patient    ..................................................               ................................................  Date                                            Time    ..........................................................................................................................................  Reviewed by Signature/Title    ...................................................              ..............................................  Date                                                            Time

## 2018-06-09 NOTE — H&P
[facility]    History and Physical  Hospitalist       Date of Admission:  6/9/2018    Assessment & Plan   Duane E Arola is a 73 year old male with history of hypertension, hyperlipidemia, type 2 diabetes mellitus who underwent lap cholecystectomy about a month ago presented to the ED with a complaint of cellulitis of his right leg going on for 11 days.  His issues are the following:    Right leg cellulitis: DVT has been ruled out.  Patient has right leg cellulitis with leukocytosis.  The patient failed Keflex as an outpatient.  I have started him on vancomycin and cefepime.  Will also do MRSA screen.  If it continued to get worse then we will probably need an MRI to evaluate for necrotizing fasciitis.  Continue pain control with Tylenol, oxycodone and Dilaudid as needed.     Type 2 diabetes mellitus: Continue on Tresiba 110 units Qdaily.  Cover with correction scale insulin.  Check HbA1c.    Hypertension: The patient is hypertensive.  This might be contributed by the pain.  I will start him on lisinopril as he will probably need it at home with his diabetes mellitus and hypertension.    Hyperlipidemia: Continue on simvastatin    DVT Prophylaxis: Enoxaparin (Lovenox) SQ  Code Status: Full Code    Disposition: Expected discharge in 2-3 days once improved.     Donta Ricketts    Primary Care Physician   Donavan Melgar    Chief Complaint   Right leg redness, swelling and pain for 11 days    History is obtained from the patient    History of Present Illness   Duane E Arola is a 73 year old male with history of hypertension, hyperlipidemia, type 2 diabetes mellitus who underwent lap cholecystectomy about a month ago presented to the ED with a complaint of cellulitis of his right leg going on for 11 days.  The patient started having redness and swelling of the right leg 11 days ago.  He went to his primary care physician when he was prescribed 10 days course of Keflex.  He was getting slightly better but about 2 days  ago the leg has been getting more swollen with redness also has worsening pain that he could not walk today.  He had to come to the ED for further evaluation.  The patient denies any chest pain or shortness of breath.  He denies fevers or chills.  No nausea or vomiting.      Past Medical History    I have reviewed this patient's medical history and updated it with pertinent information if needed.     Hypertension  Diabetes mellitus  Hyperlipidemia    Past Surgical History   I have reviewed this patient's surgical history and updated it with pertinent information if needed.  Past Surgical History:   Procedure Laterality Date     COLONOSCOPY N/A 1/26/2018    Procedure: COLONOSCOPY;  COLONOSCOPY;  Surgeon: Arnel Childers MD;  Location: [facility]     COLONOSCOPY - HIM SCAN  06/2012    [facility]     COLONOSCOPY - HIM SCAN  06/01/2006    Colonoscopy, Dr Marquez, [facility] 6/2006     PHACOEMULSIFICATION WITH STANDARD INTRAOCULAR LENS IMPLANT Right 3/22/2016    Procedure: PHACOEMULSIFICATION WITH STANDARD INTRAOCULAR LENS IMPLANT;  Surgeon: Ramses Mcnulty MD;  Location: [facility]     PHACOEMULSIFICATION WITH STANDARD INTRAOCULAR LENS IMPLANT Left 11/2/2017    Procedure: PHACOEMULSIFICATION WITH STANDARD INTRAOCULAR LENS IMPLANT;  CATARACT EXTRACTION LEFT EYE WITH IMPLANT;  Surgeon: Severiano Martin MD;  Location: [facility]   Status post cholecystectomy about a month ago    Prior to Admission Medications   Prior to Admission Medications   Prescriptions Last Dose Informant Patient Reported? Taking?   ASPIRIN PO 6/8/2018 at 2100  Yes Yes   Sig: Take 162 mg by mouth daily    CEPHALEXIN PO 6/9/2018 at 0700  Yes Yes   Sig: Take 500 mg by mouth 2 times daily   Cranberry 1000 MG CAPS 6/9/2018 at 0700  Yes Yes   Multiple Vitamins-Minerals (CENTRUM SILVER) per tablet 6/9/2018 at 0700  Yes Yes   Sig: Take 1 tablet by mouth daily.   Pioglitazone HCl-Glimepiride 30-2 MG TABS 6/9/2018 at 0700  Yes Yes   Sig: Take 1 tablet by mouth daily   SIMVASTATIN PO  2018 at 2100  Yes Yes   Sig: Take 20 mg by mouth daily   garlic 150 MG TABS 2018 at 0700  Yes Yes   Sig: Take 150 mg by mouth daily.   hypromellose (ARTIFICIAL TEARS) 0.5 % SOLN ophthalmic solution More than a month at Unknown time  Yes No   Si drop every hour as needed for dry eyes   insulin degludec (TRESIBA FLEXTOUCH) 200 UNIT/ML pen 2018 at 0700  No Yes   Sig: Inject 110 Units Subcutaneous daily   Patient taking differently: Inject 112 Units Subcutaneous every morning    insulin pen needle (BD CK U/F) 32G X 4 MM 2018 at Unknown time  No Yes   Sig: Use 2 pen needles daily.   insulin pen needle (TOPCARE CLICKFINE PEN NEEDLES) 31G X 6 MM 2018 at Unknown time  No Yes   Sig: Use pen needles daily or as directed.      Facility-Administered Medications: None     Allergies   Allergies   Allergen Reactions     Metformin      Legs get weak     Seafood Hives       Social History   I have reviewed this patient's social history and updated it with pertinent information if needed. Duane E Geoff  reports that he has never smoked. He has never used smokeless tobacco. He reports that he does not drink alcohol.    Family History   I have reviewed this patient's family history and updated it with pertinent information if needed.   No specific cancers in the family    Review of Systems   GENERAL/CONSTITUTIONAL: The patient denies fever, fatigue, weakness, weight gain or weight loss.  HEAD, EYES, EARS, NOSE AND THROAT: Eyes - The patient denies pain, redness, loss of vision,blurred vision,Ears, nose, mouth and throat. The patient denies ringing in the ears, loss of hearing, nosebleeds, loss of sense of smell,   CARDIOVASCULAR: The patient denies chest pain, irregular heartbeats, palpitation, shortness of breath, orthopnea or PND  RESPIRATORY: The patient denies chronic dry cough, Hemoptysis,  repeated pneumonias, wheezing or night sweats.  GASTROINTESTINAL: The patient denies decreased appetite, nausea,  vomiting, ,hematochezia, melena or hematemesis. No abdominal pain  GENITOURINARY: The patient denies difficult urination, pain or burning with urination, blood in the urine,  MUSCULOSKELETAL: As per HPI   SKIN : As per HPI  NEUROLOGIC: The patient denies headache, dizziness, fainting, seizures, loss of consciousness, sensitivity or pain in the hands and feet or memory loss.  PSYCHIATRIC: The patient denies depression with thoughts of suicide,  ENDOCRINE: The patient denies intolerance to hot or cold temperature,  HEMATOLOGIC/LYMPHATIC: The patient denies anemia, bleeding tendency or clotting tendency.  ALLERGIC/IMMUNOLOGIC: The patient denies rhinitis, skin sensitivity, latex allergies or sensitivity.    Physical Exam   Temp: 98.6  F (37  C) Temp src: Oral BP: 155/79 Pulse: 90 Heart Rate: 87 Resp: 19 SpO2: 98 % O2 Device: None (Room air)    Vital Signs with Ranges  Temp:  [97  F (36.1  C)-98.6  F (37  C)] 98.6  F (37  C)  Pulse:  [90] 90  Heart Rate:  [87-88] 87  Resp:  [19] 19  BP: (143-158)/(60-79) 155/79  SpO2:  [97 %-99 %] 98 %  244 lbs 0 oz  GENERAL APPEARANCE: The patient is  well-developed, well-nourished ,  in no acute distress.  HEENT: Normocephalic and atraumatic. No scleral icterus.PERRLA. No conjunctival injection is noted. No oropharyngeal lesions.  NECK: Supple. Trachea is midline. No evidence of thyroid enlargement. No lymphadenopathy or tenderness.  CHEST: Symmetric. Nontender to palpation.  LUNGS: Breath sounds are equal and clear bilaterally. No wheezes, rhonchi, or rales.  HEART: Regular rate and rhythm with normal S1 and S2. No murmurs, gallops, or rubs.  ABDOMEN: Soft, flat, and benign. No mass, tenderness, guarding, or rebound. No organomegaly or hernia. Bowel sounds are present. No CVA tenderness or flank mass.  RECTAL: Deferred  EXTREMITIES:The right anterior leg and approximately two thirds of the circumference are deep red in color and there are lesions on the shin area that patient states  have been there for years.  No fluctuance  NEUROLOGIC: No focal sensory or motor deficits are noted. Cranial nerves II through XII are intact. Grossly nonfocal examination  PSYCHIATRIC: Appropriate mood and affect.  SKIN: Redness of the right anterior leg extending into the right thigh  LYMPHATICS: No cervical, axillary, or groin adenopathy is noted.    Data   Data reviewed today:      Recent Labs  Lab 06/09/18  1748   WBC 14.5*   HGB 11.4*   MCV 90         POTASSIUM 4.4   CHLORIDE 103   CO2 28   BUN 23   CR 0.98   ANIONGAP 7   BUSTER 8.8   *   ALBUMIN 3.3*   PROTTOTAL 8.8   BILITOTAL 0.4   ALKPHOS 80   ALT 38   AST 28       Dopplers of the right lower extremity: No DVT.  Total time spent in patient care was55 minutes. >  50% of time was spent in coordination of care and patient counseling.

## 2018-06-09 NOTE — PHARMACY-VANCOMYCIN DOSING SERVICE
Initial loading dose of vancomycin dosed at maximum recommended single dose of 2500 mg (approximately 22 mg/kg).  Pharmacy will continue to follow if consult upon admission.    Alma Clemons, PharmD  June 9, 2018

## 2018-06-09 NOTE — ED NOTES
Pt with redness of right lower leg for 11 days,  Pt has been on Keflex for 11 days.  States leg was getting better but now is worse today.  More pain now with weight bearing.

## 2018-06-09 NOTE — IP AVS SNAPSHOT
MRN:[id number]                      After Visit Summary   6/9/2018    Duane E Arola    MRN: [id number]           Thank you!     Thank you for choosing [facility] for your care. Our goal is always to provide you with excellent care. Hearing back from our patients is one way we can continue to improve our services. Please take a few minutes to complete the written survey that you may receive in the mail after you visit with us. Thank you!        Patient Information     Date Of Birth          1945        Designated Caregiver       Most Recent Value    Caregiver    Will someone help with your care after discharge? no      About your hospital stay     You were admitted on:  June 9, 2018 You last received care in the:  [facility]    You were discharged on:  June 13, 2018        Reason for your hospital stay       Pt treated for RLE cellulitis with IV abx (failed OP oral Abx treatment).                  Who to Call     For medical emergencies, please call 911.  For non-urgent questions about your medical care, please call your primary care provider or clinic, [phone]          Attending Provider     Provider Specialty    Elida Wise MD Emergency Medicine    [facility]    William Ferreira MD Internal Medicine    Nara Gracia MD Internal Medicine       Primary Care Provider Office Phone # Fax #    Donavan Melgar [phone]       When to contact your care team       Call primary  if you have any of the following: temp >=101.4, pain and redness returns.                  After Care Instructions     Activity       Your activity upon discharge: activity as tolerated and elevate the RLE when resting. Use walker with activity            Diet       Follow this diet upon discharge: Orders Placed This Encounter      Combination Diet Regular Diet Adult            Discharge Instructions                 Follow-up Appointments     Follow-up  "and recommended labs and tests        Follow up with primary care provider, Donavan Melgar, within 5 days to evaluate medication change and for hospital follow- up.  Primary will follow blood cultures final results                  Further instructions from your care team       Follow up at [facility] with Dr. Melgar Wednesday 6/20/18 at 10:30 am.     Pending Results     Date and Time Order Name Status Description    6/9/2018 1751 Blood culture Preliminary     6/9/2018 1750 Blood culture Preliminary             Statement of Approval     Ordered          06/13/18 0953  I have reviewed and agree with all the recommendations and orders detailed in this document.  EFFECTIVE NOW     Approved and electronically signed by:  Nara Gracia MD             Admission Information     Date & Time Provider Department Dept. Phone    6/9/2018 Nara Grcaia MD [facility] [phone]      Your Vitals Were     Blood Pressure Pulse Temperature Respirations Height Weight    150/77 84 98.1  F (36.7  C) (Tympanic) 18 1.753 m (5' 9\") 109.6 kg (241 lb 10 oz)    Pulse Oximetry BMI (Body Mass Index)                95% 35.68 kg/m2          MyChart Information     [employer] gives you secure access to your electronic health record. If you see a primary care provider, you can also send messages to your care team and make appointments. If you have questions, please call your primary care clinic.  If you do not have a primary care provider, please call [phone] and they will assist you.        Care EveryWhere ID     This is your Care EveryWhere ID. This could be used by other organizations to access your [place] medical records  [id number]        Equal Access to Services     LAMONTE RIOS : Piedad Murray, wachula luyajaira, qaybta kaalmanithin thompson. So [facility] [phone].    ATENCIÓN: Si habla español, tiene a bell disposición servicios gratuitos de " asistencia lingüística. [place] al [phone].    We comply with applicable federal civil rights laws and [place] laws. We do not discriminate on the basis of race, color, national origin, age, disability, sex, sexual orientation, or gender identity.               Review of your medicines      START taking        Dose / Directions    acetaminophen 325 MG tablet   Commonly known as:  TYLENOL   Used for:  Cellulitis of right lower extremity        Dose:  650 mg   Take 2 tablets (650 mg) by mouth every 6 hours as needed for mild pain   Quantity:  100 tablet   Refills:  0       clindamycin 300 MG capsule   Commonly known as:  CLEOCIN   Indication:  Infection of the Skin and/or Related Soft Tissue   Used for:  Cellulitis of right lower extremity        Dose:  300 mg   Take 1 capsule (300 mg) by mouth every 8 hours for 3 days   Quantity:  9 capsule   Refills:  0         CONTINUE these medicines which may have CHANGED, or have new prescriptions. If we are uncertain of the size of tablets/capsules you have at home, strength may be listed as something that might have changed.        Dose / Directions    insulin degludec 200 UNIT/ML pen   Commonly known as:  TRESIBA FLEXTOUCH   This may have changed:    - how much to take  - when to take this   Used for:  Type 2 diabetes mellitus with hyperglycemia, with long-term current use of insulin (H)        Dose:  110 Units   Inject 110 Units Subcutaneous daily   Quantity:  15 mL   Refills:  3         CONTINUE these medicines which have NOT CHANGED        Dose / Directions    ASPIRIN PO        Dose:  162 mg   Take 162 mg by mouth daily   Refills:  0       CENTRUM SILVER per tablet        Dose:  1 tablet   Take 1 tablet by mouth daily.   Refills:  0       Cranberry 1000 MG Caps        Refills:  0       garlic 150 MG Tabs tablet        Dose:  150 mg   Take 150 mg by mouth daily.   Refills:  0       hypromellose 0.5 % Soln ophthalmic solution   Commonly known as:  ARTIFICIAL TEARS         Dose:  1 drop   1 drop every hour as needed for dry eyes   Refills:  0       * insulin pen needle 32G X 4 MM   Commonly known as:  BD CK U/F   Used for:  Diabetes mellitus, type 2 (H)        Use 2 pen needles daily.   Quantity:  200 each   Refills:  6       * insulin pen needle 31G X 6 MM   Commonly known as:  TOPCARE CLICKFINE PEN NEEDLES   Used for:  Type 2 diabetes mellitus with hyperglycemia (H)        Use pen needles daily or as directed.   Quantity:  100 each   Refills:  12       Pioglitazone HCl-Glimepiride 30-2 MG Tabs        Dose:  1 tablet   Take 1 tablet by mouth daily   Refills:  0       SIMVASTATIN PO        Dose:  20 mg   Take 20 mg by mouth daily   Refills:  0       * Notice:  This list has 2 medication(s) that are the same as other medications prescribed for you. Read the directions carefully, and ask your doctor or other care provider to review them with you.      STOP taking     CEPHALEXIN PO                Where to get your medicines      These medications were sent to [facility] [address] [address]     Phone:  [phone]     acetaminophen 325 MG tablet    clindamycin 300 MG capsule                Protect others around you: Learn how to safely use, store and throw away your medicines at www.disposemymeds.org.        ANTIBIOTIC INSTRUCTION     You've Been Prescribed an Antibiotic - Now What?  Your healthcare team thinks that you or your loved one might have an infection. Some infections can be treated with antibiotics, which are powerful, life-saving drugs. Like all medications, antibiotics have side effects and should only be used when necessary. There are some important things you should know about your antibiotic treatment.      Your healthcare team may run tests before you start taking an antibiotic.    Your team may take samples (e.g., from your blood, urine or other areas) to run tests to look for bacteria. These  test can be important to determine if you need an antibiotic at all and, if you do, which antibiotic will work best.      Within a few days, your healthcare team might change or even stop your antibiotic.    Your team may start you on an antibiotic while they are working to find out what is making you sick.    Your team might change your antibiotic because test results show that a different antibiotic would be better to treat your infection.    In some cases, once your team has more information, they learn that you do not need an antibiotic at all. They may find out that you don't have an infection, or that the antibiotic you're taking won't work against your infection. For example, an infection caused by a virus can't be treated with antibiotics. Staying on an antibiotic when you don't need it is more likely to be harmful than helpful.      You may experience side effects from your antibiotic.    Like all medications, antibiotics have side effects. Some of these can be serious.    Let you healthcare team know if you have any known allergies when you are admitted to the hospital.    One significant side effect of nearly all antibiotics is the risk of severe and sometimes deadly diarrhea caused by Clostridium difficile (C. Difficile). This occurs when a person takes antibiotics because some good germs are destroyed. Antibiotic use allows C. diificile to take over, putting patients at high risk for this serious infection.    As a patient or caregiver, it is important to understand your or your loved one's antibiotic treatment. It is especially important for caregivers to speak up when patients can't speak for themselves. Here are some important questions to ask your healthcare team.    What infection is this antibiotic treating and how do you know I have that infection?    What side effects might occur from this antibiotic?    How long will I need to take this antibiotic?    Is it safe to take this antibiotic with  other medications or supplements (e.g., vitamins) that I am taking?     Are there any special directions I need to know about taking this antibiotic? For example, should I take it with food?    How will I be monitored to know whether my infection is responding to the antibiotic?    What tests may help to make sure the right antibiotic is prescribed for me?      Information provided by:  www.cdc.gov/getsmart  U.S. Department of Health and Human Services  Centers for disease Control and Prevention  National Center for Emerging and Zoonotic Infectious Diseases  Division of Healthcare Quality Promotion             Medication List: This is a list of all your medications and when to take them. Check marks below indicate your daily home schedule. Keep this list as a reference.      Medications           Morning Afternoon Evening Bedtime As Needed    acetaminophen 325 MG tablet   Commonly known as:  TYLENOL   Take 2 tablets (650 mg) by mouth every 6 hours as needed for mild pain   Last time this was given:  650 mg on 6/11/2018  1:05 AM                                ASPIRIN PO   Take 162 mg by mouth daily   Last time this was given:  162 mg on 6/12/2018  9:11 PM                                CENTRUM SILVER per tablet   Take 1 tablet by mouth daily.                                clindamycin 300 MG capsule   Commonly known as:  CLEOCIN   Take 1 capsule (300 mg) by mouth every 8 hours for 3 days   Last time this was given:  300 mg on 6/13/2018  6:28 AM                                Cranberry 1000 MG Caps                                garlic 150 MG Tabs tablet   Take 150 mg by mouth daily.                                hypromellose 0.5 % Soln ophthalmic solution   Commonly known as:  ARTIFICIAL TEARS   1 drop every hour as needed for dry eyes                                insulin degludec 200 UNIT/ML pen   Commonly known as:  TRESIBA FLEXTOUCH   Inject 110 Units Subcutaneous daily   Last time this was given:  110 Units on  6/13/2018  9:32 AM                                * insulin pen needle 32G X 4 MM   Commonly known as:  BD CK U/F   Use 2 pen needles daily.                                * insulin pen needle 31G X 6 MM   Commonly known as:  TOPCARE CLICKFINE PEN NEEDLES   Use pen needles daily or as directed.                                Pioglitazone HCl-Glimepiride 30-2 MG Tabs   Take 1 tablet by mouth daily                                SIMVASTATIN PO   Take 20 mg by mouth daily   Last time this was given:  20 mg on 6/12/2018  9:12 PM                                * Notice:  This list has 2 medication(s) that are the same as other medications prescribed for you. Read the directions carefully, and ask your doctor or other care provider to review them with you.              More Information        Cellulitis  Cellulitis is an infection of the deep layers of skin. A break in the skin, such as a cut or scratch, can let bacteria under the skin. If the bacteria get to deep layers of the skin, it can be serious. If not treated, cellulitis can get into the bloodstream and lymph nodes. The infection can then spread throughout the body. This causes serious illness.  Cellulitis causes the affected skin to become red, swollen, warm, and sore. The reddened areas have a visible border. An open sore may leak fluid (pus). You may have a fever, chills, and pain.  Cellulitis is treated with antibiotics taken for 7 to 10 days. An open sore may be cleaned and covered with cool wet gauze. Symptoms should get better 1 to 2 days after treatment is started. Make sure to take all the antibiotics for the full number of days until they are gone. Keep taking the medicine even if your symptoms go away.  Home care  Follow these tips:    Limit the use of the part of your body with cellulitis.     If the infection is on your leg, keep your leg raised while sitting. This will help to reduce swelling.    Take all of the antibiotic medicine exactly as  directed until it is gone. Do not miss any doses, especially during the first 7 days. Don t stop taking the medicine when your symptoms get better.    Keep the affected area clean and dry.    Wash your hands with soap and warm water before and after touching your skin. Anyone else who touches your skin should also wash his or her hands. Don't share towels.  Follow-up care  Follow up with your healthcare provider, or as advised. If your infection does not go away on the first antibiotic, your healthcare provider will prescribe a different one.  When to seek medical advice  Call your healthcare provider right away if any of these occur:    Red areas that spread    Swelling or pain that gets worse    Fluid leaking from the skin (pus)    Fever higher of 100.4  F (38.0  C) or higher after 2 days on antibiotics  Date Last Reviewed: 9/1/2016 2000-2017 [employer]. [address] [place] [address]. All rights reserved. This information is not intended as a substitute for professional medical care. Always follow your healthcare professional's instructions.                Discharge Instructions for Cellulitis  You have been diagnosed with cellulitis. This is an infection in the deepest layer of the skin. In some cases, the infection also affects the muscle. Cellulitis is caused by bacteria. The bacteria can enter the body through broken skin. This can happen with a cut, scratch, animal bite, or an insect bite that has been scratched. You may have been treated in the hospital with antibiotics and fluids. You will likely be given a prescription for antibiotics to take at home. This sheet will help you take care of yourself at home.  Home care  When you are home:    Take the prescribed antibiotic medicine you are given as directed until it is gone. Take it even if you feel better. It treats the infection and stops it from returning. Not taking all the medicine can make future infections hard to  treat.    Keep the infected area clean.    When possible, raise the infected area above the level of your heart. This helps keep swelling down.    Talk with your healthcare provider if you are in pain. Ask what kind of over-the-counter medicine you can take for pain.    Apply clean bandages as advised.    Take your temperature once a day for a week.    Wash your hands often to prevent spreading the infection.  In the future, wash your hands before and after you touch cuts, scratches, or bandages. This will help prevent infection.   When to call your healthcare provider  Call your healthcare provider immediately if you have any of the following:    Difficulty or pain when moving the joints above or below the infected area    Discharge or pus draining from the area    Fever of 100.4 F (38 C) or higher, or as directed by your healthcare provider    Pain that gets worse in or around the infected     Redness that gets worse in or around the infected area, particularly if the area of redness expands to a wider area    Shaking chills    Swelling of the infected area    Vomiting   Date Last Reviewed: 8/1/2016 2000-2017 [employer]. [address] [place] [address]. All rights reserved. This information is not intended as a substitute for professional medical care. Always follow your healthcare professional's instructions.                Clindamycin capsules  Brand Name: Cleocin  What is this medicine?  CLINDAMYCIN (KLIN da MYE sin) is a lincosamide antibiotic. It is used to treat certain kinds of bacterial infections. It will not work for colds, flu, or other viral infections.  How should I use this medicine?  Take this medicine by mouth with a full glass of water. Follow the directions on the prescription label. You can take this medicine with food or on an empty stomach. If the medicine upsets your stomach, take it with food. Take your medicine at regular intervals. Do not take your medicine more  often than directed. Take all of your medicine as directed even if you think your are better. Do not skip doses or stop your medicine early.  Talk to your pediatrician regarding the use of this medicine in children. Special care may be needed.  What side effects may I notice from receiving this medicine?  Side effects that you should report to your doctor or health care professional as soon as possible:    allergic reactions like skin rash, itching or hives, swelling of the face, lips, or tongue    dark urine    pain on swallowing    redness, blistering, peeling or loosening of the skin, including inside the mouth    unusual bleeding or bruising    unusually weak or tired    yellowing of eyes or skin  Side effects that usually do not require medical attention (report to your doctor or health care professional if they continue or are bothersome):    diarrhea    itching in the rectal or genital area    joint pain    nausea, vomiting    stomach pain  What may interact with this medicine?      birth control pills    erythromycin    medicines that relax muscles for surgery    rifampin  What if I miss a dose?  If you miss a dose, take it as soon as you can. If it is almost time for your next dose, take only that dose. Do not take double or extra doses.  Where should I keep my medicine?  Keep out of the reach of children.  Store at room temperature between 20 and 25 degrees C (68 and 77 degrees F). Throw away any unused medicine after the expiration date.  What should I tell my health care provider before I take this medicine?  They need to know if you have any of these conditions:    kidney disease    liver disease    stomach problems like colitis    an unusual or allergic reaction to clindamycin, lincomycin, or other medicines, foods, dyes like tartrazine or preservatives    pregnant or trying to get pregnant    breast-feeding  What should I watch for while using this medicine?  Tell your doctor or healthcare  professional if your symptoms do not start to get better or if they get worse.  Do not treat diarrhea with over the counter products. Contact your doctor if you have diarrhea that lasts more than 2 days or if it is severe and watery.  NOTE:This sheet is a summary. It may not cover all possible information. If you have questions about this medicine, talk to your doctor, pharmacist, or health care provider. Copyright  2018 [employer]                Patient Education    Lactobacillus Acidophilus Oral capsule    Lactobacillus Acidophilus Oral capsule, modified-release    Lactobacillus Acidophilus Oral tablet    Lactobacillus Acidophilus, Lactobacillus Sp. Oral granules    Lactobacillus Acidophilus, Lactobacillus Sporogenes Oral tablet  Lactobacillus Acidophilus Oral capsule  What is this medicine?  LACTOBACILLUS (lak sole buh CHAY uhs) is a supplement. It is used to help the normal balance of bacteria in the colon. This may treat or prevent diarrhea caused by an infection or by antibiotics. The FDA has not approved this supplement for any medical use.  This supplement may be used for other purposes; ask your health care provider or pharmacist if you have questions.  What should I tell my health care provider before I take this medicine?  They need to know if you have any of these conditions:    chronic disease    immune system problems    prosthetic heart valve or valvular heart disease    an unusual or allergic reaction to Lactobacillus, any medicines, lactose or milk, other foods, dyes, or preservatives    pregnant or trying to get pregnant    breast-feeding  How should I use this medicine?  Take this medicine by mouth with a small amount of milk, fruit juice, or water. Follow the directions on the package labeling, or take as directed by your health care professional. This medicine can be taken with cereal or other food. Do not take this medicine more often than directed.  Contact your pediatrician regarding the use of  this medicine in children. Special care may be needed. This medicine is not recommended for children under 3 years old unless prescribed by a doctor.  Overdosage: If you think you have taken too much of this medicine contact a poison control center or emergency room at once.  NOTE: This medicine is only for you. Do not share this medicine with others.  What if I miss a dose?  If you miss a dose, take it as soon as you can. If it is almost time for your next dose, take only that dose. Do not take double or extra doses.  What may interact with this medicine?  Interactions are not expected.  This list may not describe all possible interactions. Give your health care provider a list of all the medicines, herbs, non-prescription drugs, or dietary supplements you use. Also tell them if you smoke, drink alcohol, or use illegal drugs. Some items may interact with your medicine.  What should I watch for while using this medicine?  See your doctor if your symptoms do not get better or if they get worse. Do not take this supplement for more than 2 days or if you have a fever unless your doctor tells you to.  If you have allergies to milk or you are sensitive to lactose, do not use this supplement.  What side effects may I notice from receiving this medicine?  Side effects that you should report to your doctor or health care professional as soon as possible:    allergic reactions like skin rash, itching or hives, swelling of the face, lips, or tongue    breathing problems    severe nausea, vomiting    unusually weak or tired  Side effects that usually do not require medical attention (report to your doctor or health care professional if they continue or are bothersome):    hiccups    stomach gas  This list may not describe all possible side effects. Call your doctor for medical advice about side effects. You may report side effects to FDA at 5-680-FDA-2307.  Where should I keep my medicine?  Keep out of the reach of  children.  Store in the refrigerator or as directed on the package label. Do not freeze. Throw away any unused medicine after the expiration date.  NOTE:This sheet is a summary. It may not cover all possible information. If you have questions about this medicine, talk to your doctor, pharmacist, or health care provider. Copyright  2016 [employer]

## 2018-06-10 LAB
BACTERIA SPEC CULT: NORMAL
BASOPHILS # BLD AUTO: 0.1 10E9/L (ref 0–0.2)
BASOPHILS NFR BLD AUTO: 0.6 %
CREAT SERPL-MCNC: 0.95 MG/DL (ref 0.66–1.25)
DIFFERENTIAL METHOD BLD: ABNORMAL
EOSINOPHIL # BLD AUTO: 0.2 10E9/L (ref 0–0.7)
EOSINOPHIL NFR BLD AUTO: 1.1 %
ERYTHROCYTE [DISTWIDTH] IN BLOOD BY AUTOMATED COUNT: 13.4 % (ref 10–15)
GFR SERPL CREATININE-BSD FRML MDRD: 77 ML/MIN/1.7M2
GLUCOSE BLDC GLUCOMTR-MCNC: 152 MG/DL (ref 70–99)
GLUCOSE BLDC GLUCOMTR-MCNC: 158 MG/DL (ref 70–99)
GLUCOSE BLDC GLUCOMTR-MCNC: 63 MG/DL (ref 70–99)
GLUCOSE BLDC GLUCOMTR-MCNC: 92 MG/DL (ref 70–99)
GLUCOSE BLDC GLUCOMTR-MCNC: 96 MG/DL (ref 70–99)
HCT VFR BLD AUTO: 34.9 % (ref 40–53)
HGB BLD-MCNC: 11.7 G/DL (ref 13.3–17.7)
IMM GRANULOCYTES # BLD: 0.1 10E9/L (ref 0–0.4)
IMM GRANULOCYTES NFR BLD: 0.6 %
LYMPHOCYTES # BLD AUTO: 2.3 10E9/L (ref 0.8–5.3)
LYMPHOCYTES NFR BLD AUTO: 16.4 %
MCH RBC QN AUTO: 30.6 PG (ref 26.5–33)
MCHC RBC AUTO-ENTMCNC: 33.5 G/DL (ref 31.5–36.5)
MCV RBC AUTO: 91 FL (ref 78–100)
MONOCYTES # BLD AUTO: 1.3 10E9/L (ref 0–1.3)
MONOCYTES NFR BLD AUTO: 9.1 %
NEUTROPHILS # BLD AUTO: 9.9 10E9/L (ref 1.6–8.3)
NEUTROPHILS NFR BLD AUTO: 72.2 %
NRBC # BLD AUTO: 0 10*3/UL
NRBC BLD AUTO-RTO: 0 /100
PLATELET # BLD AUTO: 366 10E9/L (ref 150–450)
PROCALCITONIN SERPL-MCNC: <0.05 NG/ML
RBC # BLD AUTO: 3.82 10E12/L (ref 4.4–5.9)
SPECIMEN SOURCE: NORMAL
WBC # BLD AUTO: 13.8 10E9/L (ref 4–11)

## 2018-06-10 PROCEDURE — 99232 SBSQ HOSP IP/OBS MODERATE 35: CPT | Performed by: INTERNAL MEDICINE

## 2018-06-10 PROCEDURE — 25000128 H RX IP 250 OP 636: Performed by: INTERNAL MEDICINE

## 2018-06-10 PROCEDURE — 36415 COLL VENOUS BLD VENIPUNCTURE: CPT | Performed by: INTERNAL MEDICINE

## 2018-06-10 PROCEDURE — 82565 ASSAY OF CREATININE: CPT | Performed by: INTERNAL MEDICINE

## 2018-06-10 PROCEDURE — 25000131 ZZH RX MED GY IP 250 OP 636 PS 637: Performed by: HOSPITALIST

## 2018-06-10 PROCEDURE — 12000000 ZZH R&B MED SURG/OB

## 2018-06-10 PROCEDURE — 25000128 H RX IP 250 OP 636: Performed by: HOSPITALIST

## 2018-06-10 PROCEDURE — 00000146 ZZHCL STATISTIC GLUCOSE BY METER IP

## 2018-06-10 PROCEDURE — 85025 COMPLETE CBC W/AUTO DIFF WBC: CPT | Performed by: INTERNAL MEDICINE

## 2018-06-10 PROCEDURE — 84145 PROCALCITONIN (PCT): CPT | Performed by: INTERNAL MEDICINE

## 2018-06-10 PROCEDURE — 25000132 ZZH RX MED GY IP 250 OP 250 PS 637: Performed by: HOSPITALIST

## 2018-06-10 RX ORDER — CEFTRIAXONE SODIUM 1 G/50ML
1 INJECTION, SOLUTION INTRAVENOUS EVERY 24 HOURS
Status: DISCONTINUED | OUTPATIENT
Start: 2018-06-10 | End: 2018-06-12

## 2018-06-10 RX ADMIN — VANCOMYCIN HYDROCHLORIDE 1500 MG: 1 INJECTION, POWDER, LYOPHILIZED, FOR SOLUTION INTRAVENOUS at 10:15

## 2018-06-10 RX ADMIN — INSULIN DEGLUDEC INJECTION 110 UNITS: 200 INJECTION, SOLUTION SUBCUTANEOUS at 09:36

## 2018-06-10 RX ADMIN — SIMVASTATIN 20 MG: 20 TABLET, FILM COATED ORAL at 21:58

## 2018-06-10 RX ADMIN — ENOXAPARIN SODIUM 40 MG: 40 INJECTION SUBCUTANEOUS at 19:56

## 2018-06-10 RX ADMIN — ACETAMINOPHEN 650 MG: 325 TABLET, FILM COATED ORAL at 02:57

## 2018-06-10 RX ADMIN — CEFTRIAXONE SODIUM 1 G: 1 INJECTION, SOLUTION INTRAVENOUS at 09:33

## 2018-06-10 RX ADMIN — ACETAMINOPHEN 650 MG: 325 TABLET, FILM COATED ORAL at 19:56

## 2018-06-10 RX ADMIN — VANCOMYCIN HYDROCHLORIDE 1500 MG: 1 INJECTION, POWDER, LYOPHILIZED, FOR SOLUTION INTRAVENOUS at 21:57

## 2018-06-10 RX ADMIN — ASPIRIN 81 MG 162 MG: 81 TABLET ORAL at 21:58

## 2018-06-10 RX ADMIN — INSULIN ASPART 1 UNITS: 100 INJECTION, SOLUTION INTRAVENOUS; SUBCUTANEOUS at 11:39

## 2018-06-10 ASSESSMENT — PAIN DESCRIPTION - DESCRIPTORS
DESCRIPTORS: ACHING;TIGHTNESS;PRESSURE
DESCRIPTORS: CONSTANT

## 2018-06-10 NOTE — PROGRESS NOTES
AROLA, DUANE E  Patient visit during  rounds. Patient had no spiritual care requests at this time.

## 2018-06-10 NOTE — PLAN OF CARE
[facility] Inpatient Admission Note:    Patient admitted to 3116 /3116-1 at approximately 1958 via cart accompanied by transport tech from emergency room . Report received from [facility] in SBAR format at 1936 via telephone. Patient transferred to bed via self.. Patient is alert and oriented X 3, denies pain; rates at 0 on 0-10 scale.  Patient oriented to room, unit, hourly rounding, and plan of care. Explained admission packet and patient handbook with patient bill of rights brochure. Will continue to monitor and document as needed.     Inpatient Nursing criteria listed below was met:    Health care directives status obtained and documented: Yes    Care Everywhere authorization obtained No    MRSA swab completed for patient 65 years and older: Yes    Patient identifies a surrogate decision maker: Yes If yes, who: Jcarlos alvarez Contact Information:[phone]    Core Measure diagnosis present:No. If yes, state diagnosis: N/A     If initial lactic acid >2.0, repeat lactic acid drawn within one hour of arrival to unit: NA. If no, state reason: under 2    Vaccination assessment and education completed: Yes   Vaccinations received prior to admission: Pneumovax yes  Influenza(seasonal)  YES   Vaccination(s) ordered: not given today because UTD    Clergy visit ordered if patient requests: Yes    Skin issues/needs documented: Yes    Isolation Patient: no Education given, correct sign in place and documentation row added to PCS:  No    Fall Prevention No: Care plan updated, education given and documented, sticker and magnet in place: No    Care Plan initiated: Yes    Education Documented (including assessment): Yes    Patient has discharge needs : No If yes, please explain: unknown at this time

## 2018-06-10 NOTE — PLAN OF CARE
Problem: Skin and Soft Tissue Infection (Adult)  Goal: Signs and Symptoms of Listed Potential Problems Will be Absent, Minimized or Managed (Skin and Soft Tissue Infection)  Signs and symptoms of listed potential problems will be absent, minimized or managed by discharge/transition of care (reference Skin and Soft Tissue Infection (Adult) CPG).    06/09/18 2055   Skin and Soft Tissue Infection   Problems Assessed (Skin and Soft Tissue Infection) all   Problems Present (Skin/Soft Tissue Inf) infection progression;pain     VSS except low grade temp as charted. A, O, and ambulating independently in room. C/o pain in RLE when touched, no pain otherwise. RLE outlined with skin marker, more reddened, swollen, and warm compared to LLE.   Face to face report given with opportunity to observe patient.    Report given to ARNOLDO Salas   6/9/2018  11:18 PM

## 2018-06-10 NOTE — PHARMACY
[facility]    Pharmacy      Antimicrobial Stewardship Note     Current antimicrobial therapy:  Anti-infectives (Future)    Start     Dose/Rate Route Frequency Ordered Stop    06/10/18 1000  vancomycin (VANCOCIN) 1,500 mg in sodium chloride 0.9 % 500 mL intermittent infusion      1,500 mg  over 2 Hours Intravenous EVERY 12 HOURS 06/09/18 2012      06/10/18 0900  cefTRIAXone in d5w (ROCEPHIN) intermittent infusion 1 g      1 g  100 mL/hr over 30 Minutes Intravenous EVERY 24 HOURS 06/10/18 0856            Indication: SSTI    Days of Therapy: 2     Pertinent labs:  Creatinine   Creatinine   Date Value Ref Range Status   2018 0.98 0.66 - 1.25 mg/dL Final   2018 0.97 0.66 - 1.25 mg/dL Final   10/04/2017 1.00 0.80 - 1.50 mg/dL Final     WBC   WBC   Date Value Ref Range Status   2018 14.5 (H) 4.0 - 11.0 10e9/L Final   2013 14.8 (H) 4.0 - 11.0 10e9/L Final   2013 15.7 (H) 4.0 - 11.0 10e9/L Final     Procalcitonin No results found for: PCAL  CRP   CRP Inflammation   Date Value Ref Range Status   2013 9.1 (H) 0.0 - 3.0 mg/L Final     Comment:      reference ranges have not been established.  C-reactive protein   values   should be interpreted as a comparison of serial measurements.       Culture Results: no growth     Recommendations/Interventions:  1. none      Alma Clemons RPH  Latoya 10, 2018

## 2018-06-10 NOTE — PLAN OF CARE
"Problem: Patient Care Overview  Goal: Plan of Care/Patient Progress Review  Outcome: Improving  Reason for hospital stay:  Cellulitis    Most recent vitals: /64  Pulse 84  Temp 97.3  F (36.3  C) (Tympanic)  Resp 18  Ht 1.753 m (5' 9\")  Wt 113.1 kg (249 lb 5.4 oz)  SpO2 96%  BMI 36.82 kg/m2  Pain Management:  Denies pain when asked directly, but reports some pain during discussion. Offered interventions with decline  Orientation:  A&O  Cardiac:  wnl  Respiratory:  LS clear. Denies sob. On RA  GI:  BS active. Denies nausea.  :  Voids into urinal without difficulty  Diet: Reg, BG 63 and 158, administered ordered medications  Skin Issues:  RLE is very red, slight purple in some areas. Area is outlined and does not exceed boarders. Edema +2-3 in leg, ankle, and foot. Pt reports it is much better than it was.  IVF:  Saline locked  ABX:  Vanco and Rocephin  Mobility:  Stand by assist with walker or cane.  Safety:  Call light within reach, uses appropriately.    Comments:    6/10/2018  1:51 PM  Shira Kim        "

## 2018-06-10 NOTE — PROGRESS NOTES
[facility]    Hospitalist Progress Note    Date of Service (when I saw the patient): 06/10/2018    Assessment & Plan   Duane E Arola is a 73 year old  man who was admitted on 6/9/2018. History includes of hypertension, hyperlipidemia, and type 2 diabetes mellitus as well as an apparently unremarkable laparoscopic cholecystectomy about a month ago. He presented to the ED noting cellulitis of his right leg going on for 11 days.  The patient started having redness and swelling of the right leg 11 days ago.  He went to his primary care physician when he was prescribed 10 days course of Keflex.  This is not fully resolved after 7 days he was reevaluated and received a further course of antibiotics.  He felt he was improving, but about 2 days ago the leg has been getting more swollen with redness also has worsening pain to the extent that he could not walk today.  He recalls a period although transient of intense  rigors a day or so ago.  He presented ED for further evaluation.    Evaluation showed a leukocytosis without neutrophilia.  Venous duplex did not demonstrate thromboembolic disease.  He was admitted for further evaluation and management.    Right leg cellulitis:   Examination shows several areas of pustules with otherwise erythema and warmth which appears to be superficial.  Quite consistent with cellulitis involving the leg and not progressing proximally to the thigh.  Area of erythema has regressed over the 15 hours is so since treatment was begun.  No joint involvement.  Quite reasonable to treat as typical cellulitis.  Doubt that the spectrum of cefepime he is required and will de-escalate to ceftriaxone although if not a multi-organism infection Staphylococcus is most likely.  Unfortunately cultures unlikely to be revealing.  Blood cultures positive in less than 5% of patients with cellulitis and he has been receiving antibiotics.  In any event another day or so of IV antibiotics appears to be  warranted.  Have advised patient to keep his leg elevated except when he is walking but also have encouraged regular walking and similar activity.  Pro-calcitonin largely to quantitate the intensity of infectious inflammation.       Type 2 diabetes mellitus:   Appetite is been excellent throughout his course.  He indicates that recently his blood sugar is been under good control and indeed hemoglobin A1c obtained on admission 6.9 compared to 9 in October of last year, 7.7 January this year. Continue regular dose of Tresiba 110 units Qdaily.  Cover with correction scale insulin.  Check HbA1c.     Primary Hypertension:   Mildly elevated blood pressures on presentation, now improved. Related to acute illness.  On admission low-dose of lisinopril begun in light of his underlying diabetes and would be reasonable regardless of his chronic blood pressure control.      Hyperlipidemia:   Continuing chronic dose of simvastatin    Principal Problem:    Cellulitis of leg  Active Problems:    Diabetes mellitus, type 2 (H)    Cellulitis      # Pain Assessment:  Current Pain Score 6/10/2018   Patient currently in pain? denies   Pain score (0-10) -   Pain location -   Pain descriptors -   Duane s pain level was assessed and he currently denies pain.  He initially had marked pain in his right leg.  This responded to morphine sulfate in emergency department.  Since then he is only needed acetaminophen for pain control and currently has no difficulties with pain.     DVT Prophylaxis: Enoxaparin subcutaneously  Code Status: Full Code    Disposition: Expected discharge in 2-3 days once trajectory of infection clearly improved and able to make a transition to oral antibiotics    William Melendrez    Interval History   Awake alert and interactive.  No discomfort.  Good appetite.  No significant pain and believes erythema and especially discomfort in his right leg has markedly improved.    -Data reviewed today: I reviewed all new labs and  imaging results over the last 24 hours. I personally reviewed right leg venous duplex ultrasound.  No evidence of thromboembolic disease.    Peripheral IV 06/09/18 Right Hand (Active)   Site Assessment WDL 6/10/2018  8:17 AM   Line Status Saline locked;Checked every 1-2 hour 6/10/2018  8:17 AM   Phlebitis Scale 0-->no symptoms 6/10/2018  8:17 AM   Infiltration Scale 0 6/10/2018  8:17 AM   Number of days:1       Wound 09/29/15 Distal;Anterior Finger (Comment which one) Laceration 3cm lac (Active)   Number of days:985       Incision/Surgical Site 03/22/16 Right Eye (Active)   Number of days:810       Incision/Surgical Site 11/02/17 Left Eye (Active)   Number of days:220     Line/device assessment(s) completed for medical necessity Latoya 10    Physical Exam   Temp: 97.3  F (36.3  C) Temp src: Tympanic BP: 136/64 Pulse: 84 Heart Rate: 76 Resp: 20 SpO2: 96 % O2 Device: None (Room air)    Vitals:    06/09/18 1751 06/09/18 2002 06/10/18 0528   Weight: 110.7 kg (244 lb) 114.1 kg (251 lb 8.7 oz) 113.1 kg (249 lb 5.4 oz)     Vital Signs with Ranges  Temp:  [97  F (36.1  C)-99.7  F (37.6  C)] 97.3  F (36.3  C)  Pulse:  [84-90] 84  Heart Rate:  [76-88] 76  Resp:  [18-20] 20  BP: (129-181)/(60-90) 136/64  SpO2:  [95 %-100 %] 96 %  I/O last 3 completed shifts:  In: 910 [I.V.:910]  Out: 925 [Urine:925]    Awake, alert, concentration preserved.  Pleasant and interactive.    HEENT: Pupils equal, conjugate. No icterus or nystagmus. Oral mucosa moist. No facial asymmetry.   Neck: Supple, jugular veins not elevated. Trachea midline   Chest: No chest wall movement asymmetry. Aeration preserved to bases.. Accessory muscles not in use. Expiratory time not increased. No tidal wheezes. No rhonchi. No discrete crackles.   Cardiac: PMI not displaced. S1, S2 unremarkable. No S3, S4. P2 not accentuated. No murmurs. Carotid upstroke preserved. Carotid amplitude preserved.   Abdomen: Soft. No palpation or percussion tenderness. No distention.  Normoactive bowel sounds. Liver and spleen not increased in size. No bruits, masses, or pulsations.   Extremities: Right leg with mild erythema and increased warmth.  Several areas of superficial pustules anteriorly.  Skin overall intact.  Marked area of erythema on leg shows regression.  Extremities warm distally.  Easily palpable peripheral pulses.  Brisk capillary refill. No eccymoses, clubbing.   Neurologic: Mental state above. Motor 5/5 and bilaterally equal. Tone preserved. No fasiculations or tremors. Sensation intact to light touch. DTR 2/4 and bilaterally equal.     Medications       aspirin chewable tablet 162 mg  162 mg Oral QPM     cefTRIAXone  1 g Intravenous Q24H     enoxaparin  40 mg Subcutaneous Q24H     insulin aspart  1-10 Units Subcutaneous TID AC     insulin aspart  1-7 Units Subcutaneous At Bedtime     insulin degludec  110 Units Subcutaneous Daily     simvastatin (ZOCOR) tablet 20 mg  20 mg Oral QPM     vancomycin (VANCOCIN) IV  1,500 mg Intravenous Q12H           Data     Recent Labs  Lab 06/10/18  0922 06/09/18  1748   WBC 13.8* 14.5*   HGB 11.7* 11.4*   MCV 91 90    375   NA  --  138   POTASSIUM  --  4.4   CHLORIDE  --  103   CO2  --  28   BUN  --  23   CR  --  0.98   ANIONGAP  --  7   BUSTER  --  8.8   GLC  --  142*   ALBUMIN  --  3.3*   PROTTOTAL  --  8.8   BILITOTAL  --  0.4   ALKPHOS  --  80   ALT  --  38   AST  --  28       Lactic Acid   Date Value Ref Range Status   06/09/2018 1.2 0.4 - 2.0 mmol/L Final       Recent Results (from the past 24 hour(s))   US Lower Extremity Venous Duplex Right    Narrative    US LOWER EXTREMITY VENOUS DUPLEX RIGHT  6/9/2018 7:59 PM    History:Male, age 73 years SWELLING AND PAIN, ERYTHEMA, NO H/O DVT;     Comparison: None.    Technique: Grayscale and color Doppler ultrasound of the right  lower  extremity deep venous structures.    Findings:   The deep venous structures are patent and fully compressible. There is  normal augmentation of flow.       Impression    Impression:  1.  No evidence of DVT.    AIXA BURNS MD

## 2018-06-10 NOTE — PLAN OF CARE
Face to face report given with opportunity to observe patient.    Report given to ARNOLDO Hdz   6/10/2018  3:31 PM

## 2018-06-10 NOTE — PHARMACY-VANCOMYCIN DOSING SERVICE
Pharmacy Vancomycin Initial Note  Date of Service 2018  Patient's  1945  73 year old, male    Indication: Skin and Soft Tissue Infection    Current estimated CrCl = Estimated Creatinine Clearance: 81.2 mL/min (based on Cr of 0.98).    Creatinine for last 3 days  2018:  5:48 PM Creatinine 0.98 mg/dL    Recent Vancomycin Level(s) for last 3 days  No results found for requested labs within last 72 hours.      Vancomycin IV Administrations (past 72 hours)                   vancomycin (VANCOCIN) 2,500 mg in sodium chloride 0.9 % 500 mL intermittent infusion (mg) 2,500 mg New Bag 18 1857                Nephrotoxins and other renal medications (Future)    Start     Dose/Rate Route Frequency Ordered Stop    06/10/18 1000  vancomycin (VANCOCIN) 1,500 mg in sodium chloride 0.9 % 500 mL intermittent infusion      1,500 mg  over 2 Hours Intravenous EVERY 12 HOURS 18            Contrast Orders - past 72 hours     None                Plan:  1.  Start vancomycin  2500 mg once, then 1500 mg IV q12h.   2.  Goal Trough Level: 10-15 mg/L   3.  Pharmacy will check trough levels as appropriate in 1-3 Days.    4. Serum creatinine levels will be ordered daily for the first week of therapy and at least twice weekly for subsequent weeks.    5. [place] method utilized to dose vancomycin therapy: Method 1    Donavan Blair

## 2018-06-10 NOTE — PLAN OF CARE
Problem: Skin and Soft Tissue Infection (Adult)  Goal: Signs and Symptoms of Listed Potential Problems Will be Absent, Minimized or Managed (Skin and Soft Tissue Infection)  Signs and symptoms of listed potential problems will be absent, minimized or managed by discharge/transition of care (reference Skin and Soft Tissue Infection (Adult) CPG).   Outcome: No Change  Alert and oriented. VSS. PRN tylenol for pain in RLE. RLE red, warm to touch, skin intact, markings unchanged. Pulse weak in RLE but palpable. RLE edema +3 in leg and +2 in ankle and foot. Pt stated tight feeling in foot improving, however, weight bearing difficult- requiring walker and SBA. IV- NS at 100ml/hr. Call light use appropriate.

## 2018-06-10 NOTE — PROGRESS NOTES
Met with Duane.  Assessment completed see flowsheet.      LOC: alert    Others present: Patient    Dx: cellutitis       Lives with: Lives With: friend(s)- Marlee, whom Duane grew up with     Living at: Living Arrangements: house    Equipment used: none; does have Marlee's walker here     Support System: Description of Support System: Involved, Supportive        Primary PCP: Donavan Melgar    POA/Guardian: No      Health Care Directive: NO not currently interested     Pharmacy: mail order through [employer] or [facility]    :  N/a     Homecare/[facility] Services:   n/a      Adequate Resources for needs (housing, utilities, food/med): YES    Meds and appointments management: YES    Work: NO    Transportation: YES       ADLs: independent     Falls: No    Able to Return to Prior Living Arrangements: YES    [place]: YES; contacted [facility] for hospital notification      Goals: return home    Barriers: no barriers identified     Needs: Demonstrates Competency    YELENA: average     Discharge Plan: return home either one of his sons or Marlee will pick him up.  Duane denies questions or concerns.  No needs identified.  CTS will remain available.     Duane lives independently at home with his life long friend, Marlee.  They shared household chores.  Duane enjoys making fishing lures and going fishing with his sons.  He is an [employer]  and is connected with the [facility].  He manages his own medications with a calender and a sharpie to his pens when used.  He sees Dr. Melgar, or another [facility] provider that's available regularly.  He goes to the dental clinic in [place].  Sees Dr. Soria regularly for eye care.

## 2018-06-10 NOTE — PLAN OF CARE
Face to face report given with opportunity to observe patient.    Report given to ARNOLDO Silva   6/10/2018  7:20 AM

## 2018-06-11 LAB
ANION GAP SERPL CALCULATED.3IONS-SCNC: 7 MMOL/L (ref 3–14)
BACTERIA SPEC CULT: NORMAL
BACTERIA SPEC CULT: NORMAL
BASOPHILS # BLD AUTO: 0.1 10E9/L (ref 0–0.2)
BASOPHILS NFR BLD AUTO: 0.6 %
BUN SERPL-MCNC: 17 MG/DL (ref 7–30)
CALCIUM SERPL-MCNC: 8.9 MG/DL (ref 8.5–10.1)
CHLORIDE SERPL-SCNC: 105 MMOL/L (ref 94–109)
CO2 SERPL-SCNC: 27 MMOL/L (ref 20–32)
CREAT SERPL-MCNC: 0.97 MG/DL (ref 0.66–1.25)
DIFFERENTIAL METHOD BLD: ABNORMAL
EOSINOPHIL # BLD AUTO: 0.2 10E9/L (ref 0–0.7)
EOSINOPHIL NFR BLD AUTO: 1.2 %
ERYTHROCYTE [DISTWIDTH] IN BLOOD BY AUTOMATED COUNT: 13.3 % (ref 10–15)
GFR SERPL CREATININE-BSD FRML MDRD: 75 ML/MIN/1.7M2
GLUCOSE BLDC GLUCOMTR-MCNC: 203 MG/DL (ref 70–99)
GLUCOSE BLDC GLUCOMTR-MCNC: 212 MG/DL (ref 70–99)
GLUCOSE BLDC GLUCOMTR-MCNC: 220 MG/DL (ref 70–99)
GLUCOSE BLDC GLUCOMTR-MCNC: 77 MG/DL (ref 70–99)
GLUCOSE SERPL-MCNC: 104 MG/DL (ref 70–99)
HCT VFR BLD AUTO: 36.2 % (ref 40–53)
HGB BLD-MCNC: 11.8 G/DL (ref 13.3–17.7)
IMM GRANULOCYTES # BLD: 0.1 10E9/L (ref 0–0.4)
IMM GRANULOCYTES NFR BLD: 0.4 %
LYMPHOCYTES # BLD AUTO: 2.7 10E9/L (ref 0.8–5.3)
LYMPHOCYTES NFR BLD AUTO: 20.8 %
MCH RBC QN AUTO: 29.9 PG (ref 26.5–33)
MCHC RBC AUTO-ENTMCNC: 32.6 G/DL (ref 31.5–36.5)
MCV RBC AUTO: 92 FL (ref 78–100)
MONOCYTES # BLD AUTO: 1.4 10E9/L (ref 0–1.3)
MONOCYTES NFR BLD AUTO: 10.7 %
NEUTROPHILS # BLD AUTO: 8.6 10E9/L (ref 1.6–8.3)
NEUTROPHILS NFR BLD AUTO: 66.3 %
NRBC # BLD AUTO: 0 10*3/UL
NRBC BLD AUTO-RTO: 0 /100
PLATELET # BLD AUTO: 365 10E9/L (ref 150–450)
POTASSIUM SERPL-SCNC: 4.4 MMOL/L (ref 3.4–5.3)
RBC # BLD AUTO: 3.95 10E12/L (ref 4.4–5.9)
SODIUM SERPL-SCNC: 139 MMOL/L (ref 133–144)
SPECIMEN SOURCE: NORMAL
VANCOMYCIN SERPL-MCNC: 17.7 MG/L
WBC # BLD AUTO: 13 10E9/L (ref 4–11)

## 2018-06-11 PROCEDURE — 25000128 H RX IP 250 OP 636: Performed by: HOSPITALIST

## 2018-06-11 PROCEDURE — 12000000 ZZH R&B MED SURG/OB

## 2018-06-11 PROCEDURE — 80048 BASIC METABOLIC PNL TOTAL CA: CPT | Performed by: INTERNAL MEDICINE

## 2018-06-11 PROCEDURE — 25000132 ZZH RX MED GY IP 250 OP 250 PS 637: Performed by: HOSPITALIST

## 2018-06-11 PROCEDURE — 85025 COMPLETE CBC W/AUTO DIFF WBC: CPT | Performed by: INTERNAL MEDICINE

## 2018-06-11 PROCEDURE — 36415 COLL VENOUS BLD VENIPUNCTURE: CPT | Performed by: INTERNAL MEDICINE

## 2018-06-11 PROCEDURE — 99232 SBSQ HOSP IP/OBS MODERATE 35: CPT | Performed by: INTERNAL MEDICINE

## 2018-06-11 PROCEDURE — 25000128 H RX IP 250 OP 636: Performed by: INTERNAL MEDICINE

## 2018-06-11 PROCEDURE — 00000146 ZZHCL STATISTIC GLUCOSE BY METER IP

## 2018-06-11 PROCEDURE — 80202 ASSAY OF VANCOMYCIN: CPT | Performed by: INTERNAL MEDICINE

## 2018-06-11 RX ORDER — HEPARIN SODIUM 5000 [USP'U]/.5ML
5000 INJECTION, SOLUTION INTRAVENOUS; SUBCUTANEOUS EVERY 12 HOURS
Status: DISCONTINUED | OUTPATIENT
Start: 2018-06-11 | End: 2018-06-13 | Stop reason: HOSPADM

## 2018-06-11 RX ADMIN — INSULIN ASPART 3 UNITS: 100 INJECTION, SOLUTION INTRAVENOUS; SUBCUTANEOUS at 17:42

## 2018-06-11 RX ADMIN — ACETAMINOPHEN 650 MG: 325 TABLET, FILM COATED ORAL at 01:05

## 2018-06-11 RX ADMIN — INSULIN ASPART 3 UNITS: 100 INJECTION, SOLUTION INTRAVENOUS; SUBCUTANEOUS at 12:21

## 2018-06-11 RX ADMIN — SIMVASTATIN 20 MG: 20 TABLET, FILM COATED ORAL at 21:07

## 2018-06-11 RX ADMIN — CEFTRIAXONE SODIUM 1 G: 1 INJECTION, SOLUTION INTRAVENOUS at 09:12

## 2018-06-11 RX ADMIN — POLYETHYLENE GLYCOL 3350 17 G: 17 POWDER, FOR SOLUTION ORAL at 17:50

## 2018-06-11 RX ADMIN — HEPARIN SODIUM 5000 UNITS: 5000 INJECTION, SOLUTION INTRAVENOUS; SUBCUTANEOUS at 21:10

## 2018-06-11 RX ADMIN — HEPARIN SODIUM 5000 UNITS: 5000 INJECTION, SOLUTION INTRAVENOUS; SUBCUTANEOUS at 21:07

## 2018-06-11 RX ADMIN — VANCOMYCIN HYDROCHLORIDE 1500 MG: 1 INJECTION, POWDER, LYOPHILIZED, FOR SOLUTION INTRAVENOUS at 21:19

## 2018-06-11 RX ADMIN — INSULIN DEGLUDEC INJECTION 110 UNITS: 200 INJECTION, SOLUTION SUBCUTANEOUS at 09:14

## 2018-06-11 RX ADMIN — VANCOMYCIN HYDROCHLORIDE 1500 MG: 1 INJECTION, POWDER, LYOPHILIZED, FOR SOLUTION INTRAVENOUS at 10:40

## 2018-06-11 RX ADMIN — ASPIRIN 81 MG 162 MG: 81 TABLET ORAL at 21:07

## 2018-06-11 ASSESSMENT — PAIN DESCRIPTION - DESCRIPTORS
DESCRIPTORS: TENDER;TIGHTNESS
DESCRIPTORS: TENDER;TIGHTNESS

## 2018-06-11 NOTE — PLAN OF CARE
Face to face report given with opportunity to observe patient.    Report given to ARNOLDO Silva   6/11/2018  7:17 AM

## 2018-06-11 NOTE — PLAN OF CARE
Problem: Patient Care Overview  Goal: Plan of Care/Patient Progress Review  Outcome: Improving  Alert and oriented. VSS. Afebrile. Pain control with tylenol. Partial weight bearing with RLE. Edema 2+ in RLE, redness receding from marked boarders, pulse WDL. IV- saline locked. ABX- rocephin and vanco. SBA with walker in room. Call light within reach.

## 2018-06-11 NOTE — PLAN OF CARE
Problem: Patient Care Overview  Goal: Plan of Care/Patient Progress Review  Outcome: Improving  VSS. Alert, oriented, and using call light appropriately. Up ambulating independently in room with cane from home. RLE appears less swollen than yesterday and red is receding from drawn borders. PRN tylenol given for c/o pain 2/10 in RLE. Receiving IV vanco otherwise IV SL    Problem: Skin and Soft Tissue Infection (Adult)  Goal: Signs and Symptoms of Listed Potential Problems Will be Absent, Minimized or Managed (Skin and Soft Tissue Infection)  Signs and symptoms of listed potential problems will be absent, minimized or managed by discharge/transition of care (reference Skin and Soft Tissue Infection (Adult) CPG).    06/10/18 1610 06/10/18 2135   Skin and Soft Tissue Infection   Problems Assessed (Skin and Soft Tissue Infection) --  all   Problems Present (Skin/Soft Tissue Inf) situational response;pain --        Face to face report given with opportunity to observe patient.    Report given to ARNOLDO Salas   6/10/2018  11:21 PM

## 2018-06-11 NOTE — PROGRESS NOTES
[facility]    Hospitalist Progress Note    Date of Service (when I saw the patient): 06/11/2018    Assessment & Plan   Duane E Arola is a 73 year old  man who was admitted on 6/9/2018. History includes of hypertension, hyperlipidemia, and type 2 diabetes mellitus (well controlled) and recent laparoscopic cholecystectomy. He presented to the ED noting cellulitis of his right leg going on for 11 days. He went to his primary care physician when he was prescribed 10 days course of Keflex.  This is not fully resolved after 7 days he was reevaluated and received a further course of antibiotics.  He felt he was improving, but about 2 days prior to admission the leg has been getting more swollen with redness also has worsening pain to the extent that he could not walk today.  He recalls a period although transient of intense  rigors a day or so ago.  He presented ED for further evaluation.    Evaluation showed a leukocytosis without neutrophilia.  Venous duplex did not demonstrate thromboembolic disease.  He was admitted for inpatient IV antibiotic treatment and evaluation    Right leg cellulitis: primary  Started to improve (pain and erythema area)  Continue Vancomycin / Rocephin for now  Not ready to go home yet     Type 2 diabetes mellitus:   good control  Continue regular dose of Tresiba 110 units Qdaily.  Cover with correction scale insulin.     Essential Hypertension:   Treated and acceptable     Hyperlipidemia:   Continuing chronic dose of simvastatin          # Pain Assessment:  Current Pain Score 6/10/2018   Patient currently in pain? denies   Pain score (0-10) -   Pain location -   Pain descriptors -   Duane s pain level was assessed and he currently denies pain.      DVT Prophylaxis: Enoxaparin subcutaneously    Code Status: Full Code    Disposition: Expected discharge in 2-3 days   Elizabeth  [facility] Hospitalist    Interval History   6.11.2018: erythema and pain substantially improved. Still not  ready to go home. Will keep another day. Possibly will dc home tomorrow      Line/device assessment(s) completed for medical necessity June 11    Physical Exam   Temp: 97.3  F (36.3  C) Temp src: Tympanic BP: 136/64 Pulse: 84 Heart Rate: 76 Resp: 20 SpO2: 96 % O2 Device: None (Room air)    Vitals:    06/09/18 1751 06/09/18 2002 06/10/18 0528   Weight: 110.7 kg (244 lb) 114.1 kg (251 lb 8.7 oz) 113.1 kg (249 lb 5.4 oz)     Vital Signs with Ranges  Temp:  [97  F (36.1  C)-99.7  F (37.6  C)] 97.3  F (36.3  C)  Pulse:  [84-90] 84  Heart Rate:  [76-88] 76  Resp:  [18-20] 20  BP: (129-181)/(60-90) 136/64  SpO2:  [95 %-100 %] 96 %  I/O last 3 completed shifts:  In: 910 [I.V.:910]  Out: 925 [Urine:925]    General: awake, alert, not in any distress  Heent: MMM. No lesions  Cardiac: regular, no murmurs  Lungs; clear bilaterally, good air entry - exit  Abdomen: Soft. No palpation or percussion tenderness. No distention. Normoactive bowel sounds. Liver and spleen not increased in size. No pulsatile masses.  Extremities: Right leg with erythema and increased warmth.  Several areas of superficial pustules anteriorly which were present yesterday, dried now.  Skin overall intact.  Overall, erythema area decreased (what was market yesterday).   Easily palpable peripheral pulses.  Brisk capillary refill. Skin discoloration LLE due to chronic venous insuff  Neurologic: not done, not indicated    Medications       aspirin chewable tablet 162 mg  162 mg Oral QPM     cefTRIAXone  1 g Intravenous Q24H     enoxaparin  40 mg Subcutaneous Q24H     insulin aspart  1-10 Units Subcutaneous TID AC     insulin aspart  1-7 Units Subcutaneous At Bedtime     insulin degludec  110 Units Subcutaneous Daily     simvastatin (ZOCOR) tablet 20 mg  20 mg Oral QPM     vancomycin (VANCOCIN) IV  1,500 mg Intravenous Q12H           Data     Noted, leukocytosis deceasing.     Lactic Acid   Date Value Ref Range Status   06/09/2018 1.2 0.4 - 2.0 mmol/L Final        Recent Results (from the past 24 hour(s))   US Lower Extremity Venous Duplex Right    Narrative    US LOWER EXTREMITY VENOUS DUPLEX RIGHT  6/9/2018 7:59 PM    History:Male, age 73 years SWELLING AND PAIN, ERYTHEMA, NO H/O DVT;     Comparison: None.    Technique: Grayscale and color Doppler ultrasound of the right  lower  extremity deep venous structures.    Findings:   The deep venous structures are patent and fully compressible. There is  normal augmentation of flow.      Impression    Impression:  1.  No evidence of DVT.    AIXA BURNS MD

## 2018-06-11 NOTE — PHARMACY
[facility]    Pharmacy      Antimicrobial Stewardship Note     Current antimicrobial therapy:  Anti-infectives (Future)    Start     Dose/Rate Route Frequency Ordered Stop    06/10/18 1000  vancomycin (VANCOCIN) 1,500 mg in sodium chloride 0.9 % 500 mL intermittent infusion      1,500 mg  over 2 Hours Intravenous EVERY 12 HOURS 06/09/18 2012      06/10/18 0900  cefTRIAXone in d5w (ROCEPHIN) intermittent infusion 1 g      1 g  100 mL/hr over 30 Minutes Intravenous EVERY 24 HOURS 06/10/18 0856            Indication: Cellulitis    Days of Therapy: Day 3 of vanco, Day 2 of ceftriaxone.     Pertinent labs:  Creatinine   Creatinine   Date Value Ref Range Status   2018 0.97 0.66 - 1.25 mg/dL Final   06/10/2018 0.95 0.66 - 1.25 mg/dL Final   2018 0.98 0.66 - 1.25 mg/dL Final     WBC   WBC   Date Value Ref Range Status   2018 13.0 (H) 4.0 - 11.0 10e9/L Final   06/10/2018 13.8 (H) 4.0 - 11.0 10e9/L Final   2018 14.5 (H) 4.0 - 11.0 10e9/L Final     Procalcitonin   Procalcitonin   Date Value Ref Range Status   06/10/2018 <0.05 ng/ml Final     Comment:     <0.05 ng/ml  Normal  Recommendation: Very low risk of bacterial infection.   Discourage antibiotics unless strong clinical suspicion for serious infection.       CRP   CRP Inflammation   Date Value Ref Range Status   2013 9.1 (H) 0.0 - 3.0 mg/L Final     Comment:      reference ranges have not been established.  C-reactive protein   values   should be interpreted as a comparison of serial measurements.       Culture Results:   Procedure Component Value Units Date/Time      Active MRSA Surveillance Culture [[id number]] Collected: 18     Order Status: Completed Lab Status: Final result Updated: 06/10/18 1248     Specimen: Nares from Nose       Specimen Description Nares      Culture Micro No MRSA isolated     Blood culture [[id number]] Collected: 18 1803     Order Status: Completed Lab Status: Preliminary result Updated:  06/11/18 0616     Specimen: Blood       Specimen Description Blood Left Arm      Special Requests 10ML EACH      Culture Micro No growth after 2 days     Blood culture      Order Status: Canceled Lab Status: No result      Specimen: Blood      Blood culture [[id number]] Collected: 06/09/18 1748     Order Status: Completed Lab Status: Preliminary result Updated: 06/11/18 0616     Specimen: Right Hand       Specimen Description Right Hand      Culture Micro No growth after 2 days     Active MRSA Surveillance Culture [[id number]] Collected: 06/09/18 0628     Order Status: Completed Lab Status: Final result Updated: 06/11/18 0630     Specimen: Nares from Nose       Specimen Description Nares      Culture Micro Duplicate request       Canceled, Test credited          Recommendations/Interventions:  1. None at this time. Will continue to monitor.    ADRIANA Lutz Student  June 11, 2018

## 2018-06-11 NOTE — PLAN OF CARE
"Problem: Patient Care Overview  Goal: Plan of Care/Patient Progress Review  Outcome: Improving  Reason for hospital stay:  Cellulitis RLE    Most recent vitals: /72  Pulse 84  Temp 98.3  F (36.8  C) (Tympanic)  Resp 16  Ht 1.753 m (5' 9\")  Wt 111.8 kg (246 lb 7.6 oz)  SpO2 95%  BMI 36.4 kg/m2  Pain Management:  C/o RLE pain during movement. Declines any type of intervention or medication  Orientation:  A&O, very pleasant and talkative  Cardiac:  wnl  Respiratory:  LS clear. Denies sob. No use of O2  GI:  BS active. Denies nausea.  :  Voids without difficulty into urinal  Diet: Reg. BG 77 and 203, coverage given per order.  Skin Issues:  RLE is red/purple/kenia with mild edema to the calf and trace to the ankle and foot  IVF:  Saline locked  ABX:  Vanco and Rocephin  Mobility:  Stand by assist with walker  Safety:  Call light within reach    Comments:    6/11/2018  2:26 PM  Shira Kim    Face to face report given with opportunity to observe patient.    Report given to ARNOLDO Stephens   6/11/2018  3:14 PM      "

## 2018-06-11 NOTE — PHARMACY
Pharmacy Vancomycin Note  Date of Service 2018  Patient's  1945   73 year old, male    Indication: Skin and Soft Tissue Infection  Goal Trough Level: 15-20 mg/L, (possible MRSA infection)  Day of Therapy: 3  Current Vancomycin regimen:  1500 mg IV q12h    Current estimated CrCl = Estimated Creatinine Clearance: 81.1 mL/min (based on Cr of 0.97).    Creatinine for last 3 days  2018:  5:48 PM Creatinine 0.98 mg/dL  6/10/2018:  9:22 AM Creatinine 0.95 mg/dL  2018:  5:25 AM Creatinine 0.97 mg/dL    Recent Vancomycin Levels (past 3 days)  2018:  9:01 AM Vancomycin Level 17.7 mg/L    Vancomycin IV Administrations (past 72 hours)                   vancomycin (VANCOCIN) 1,500 mg in sodium chloride 0.9 % 500 mL intermittent infusion (mg) 1,500 mg New Bag 06/10/18 2157     1,500 mg New Bag  1015    vancomycin (VANCOCIN) 2,500 mg in sodium chloride 0.9 % 500 mL intermittent infusion (mg) 2,500 mg New Bag 18 1857                Nephrotoxins and other renal medications (Future)    Start     Dose/Rate Route Frequency Ordered Stop    06/10/18 1000  vancomycin (VANCOCIN) 1,500 mg in sodium chloride 0.9 % 500 mL intermittent infusion      1,500 mg  over 2 Hours Intravenous EVERY 12 HOURS 18               Contrast Orders - past 72 hours     None          Interpretation of levels and current regimen:  Trough level is  Therapeutic    Has serum creatinine changed > 50% in last 72 hours: No    Urine output:  unable to determine    Renal Function: Stable    Plan:  1.  Continue Current Dose  2.  Pharmacy will check trough levels as appropriate in 1-3 Days.    3. Serum creatinine levels will be ordered daily for the first week of therapy and at least twice weekly for subsequent weeks.      Candelario Barrera D.      .

## 2018-06-12 ENCOUNTER — APPOINTMENT (OUTPATIENT)
Dept: GENERAL RADIOLOGY | Facility: HOSPITAL | Age: 73
DRG: 603 | End: 2018-06-12
Attending: INTERNAL MEDICINE
Payer: COMMERCIAL

## 2018-06-12 LAB
CREAT SERPL-MCNC: 0.92 MG/DL (ref 0.66–1.25)
CRP SERPL-MCNC: 36.6 MG/L (ref 0–8)
GFR SERPL CREATININE-BSD FRML MDRD: 80 ML/MIN/1.7M2
GLUCOSE BLDC GLUCOMTR-MCNC: 117 MG/DL (ref 70–99)
GLUCOSE BLDC GLUCOMTR-MCNC: 172 MG/DL (ref 70–99)
GLUCOSE BLDC GLUCOMTR-MCNC: 182 MG/DL (ref 70–99)
GLUCOSE BLDC GLUCOMTR-MCNC: 220 MG/DL (ref 70–99)
GLUCOSE BLDC GLUCOMTR-MCNC: 72 MG/DL (ref 70–99)
PROCALCITONIN SERPL-MCNC: <0.05 NG/ML
URATE SERPL-MCNC: 3.5 MG/DL (ref 3.5–7.2)

## 2018-06-12 PROCEDURE — 25000128 H RX IP 250 OP 636: Performed by: INTERNAL MEDICINE

## 2018-06-12 PROCEDURE — 86140 C-REACTIVE PROTEIN: CPT | Performed by: INTERNAL MEDICINE

## 2018-06-12 PROCEDURE — 12000000 ZZH R&B MED SURG/OB

## 2018-06-12 PROCEDURE — 36415 COLL VENOUS BLD VENIPUNCTURE: CPT | Performed by: INTERNAL MEDICINE

## 2018-06-12 PROCEDURE — 25000132 ZZH RX MED GY IP 250 OP 250 PS 637: Performed by: INTERNAL MEDICINE

## 2018-06-12 PROCEDURE — 84550 ASSAY OF BLOOD/URIC ACID: CPT | Performed by: INTERNAL MEDICINE

## 2018-06-12 PROCEDURE — 99232 SBSQ HOSP IP/OBS MODERATE 35: CPT | Performed by: INTERNAL MEDICINE

## 2018-06-12 PROCEDURE — 25000132 ZZH RX MED GY IP 250 OP 250 PS 637: Performed by: HOSPITALIST

## 2018-06-12 PROCEDURE — 00000146 ZZHCL STATISTIC GLUCOSE BY METER IP

## 2018-06-12 PROCEDURE — 84145 PROCALCITONIN (PCT): CPT | Performed by: INTERNAL MEDICINE

## 2018-06-12 PROCEDURE — 73610 X-RAY EXAM OF ANKLE: CPT | Mod: TC,RT

## 2018-06-12 PROCEDURE — 82565 ASSAY OF CREATININE: CPT | Performed by: INTERNAL MEDICINE

## 2018-06-12 RX ORDER — CLINDAMYCIN HCL 300 MG
300 CAPSULE ORAL EVERY 8 HOURS SCHEDULED
Status: DISCONTINUED | OUTPATIENT
Start: 2018-06-12 | End: 2018-06-13 | Stop reason: HOSPADM

## 2018-06-12 RX ADMIN — CLINDAMYCIN HYDROCHLORIDE 300 MG: 300 CAPSULE ORAL at 21:12

## 2018-06-12 RX ADMIN — CEFTRIAXONE SODIUM 1 G: 1 INJECTION, SOLUTION INTRAVENOUS at 09:27

## 2018-06-12 RX ADMIN — INSULIN ASPART 2 UNITS: 100 INJECTION, SOLUTION INTRAVENOUS; SUBCUTANEOUS at 17:39

## 2018-06-12 RX ADMIN — HEPARIN SODIUM 5000 UNITS: 5000 INJECTION, SOLUTION INTRAVENOUS; SUBCUTANEOUS at 21:08

## 2018-06-12 RX ADMIN — INSULIN DEGLUDEC INJECTION 110 UNITS: 200 INJECTION, SOLUTION SUBCUTANEOUS at 08:19

## 2018-06-12 RX ADMIN — HEPARIN SODIUM 5000 UNITS: 5000 INJECTION, SOLUTION INTRAVENOUS; SUBCUTANEOUS at 08:18

## 2018-06-12 RX ADMIN — SIMVASTATIN 20 MG: 20 TABLET, FILM COATED ORAL at 21:12

## 2018-06-12 RX ADMIN — CLINDAMYCIN HYDROCHLORIDE 300 MG: 300 CAPSULE ORAL at 13:02

## 2018-06-12 RX ADMIN — INSULIN ASPART 2 UNITS: 100 INJECTION, SOLUTION INTRAVENOUS; SUBCUTANEOUS at 12:59

## 2018-06-12 RX ADMIN — ASPIRIN 81 MG 162 MG: 81 TABLET ORAL at 21:11

## 2018-06-12 NOTE — PLAN OF CARE
Face to face report given with opportunity to observe patient.    Report given to ARNOLDO Moctezuma   6/12/2018  3:33 PM

## 2018-06-12 NOTE — PROGRESS NOTES
[facility]    Hospitalist Progress Note    Date of Service (when I saw the patient): 06/11/2018    Assessment & Plan   Duane E Arola is a 73 year old  man who was admitted on 6/9/2018. History includes of hypertension, hyperlipidemia, and type 2 diabetes mellitus (well controlled) and recent laparoscopic cholecystectomy. He presented to the ED noting cellulitis of his right leg going on for 11 days. He went to his primary care physician when he was prescribed 10 days course of Keflex.  This is not fully resolved after 7 days he was reevaluated and received a further course of antibiotics.  He felt he was improving, but about 2 days prior to admission the leg has been getting more swollen with redness also has worsening pain to the extent that he could not walk today.  He recalls a period although transient of intense  rigors a day or so ago.  He presented ED for further evaluation.    Evaluation showed a leukocytosis without neutrophilia.  Venous duplex did not demonstrate thromboembolic disease.  He was admitted for inpatient IV antibiotic treatment and evaluation    Right leg cellulitis: primary  Improving.  IV abx changed to clindamycin (in preparation for dc)     Type 2 diabetes mellitus:   good control  Continue regular dose of Tresiba 110 units Qdaily.  Cover with correction scale insulin.     Essential Hypertension:   Treated and acceptable     Hyperlipidemia:   Continuing chronic dose of simvastatin    R ankle pain  - XR done- showed old fracture only  - uric acid 3.5          # Pain Assessment:  Current Pain Score 6/10/2018   Patient currently in pain? denies   Pain score (0-10) -   Pain location -   Pain descriptors -   Duane s pain level was assessed and he currently denies pain.      DVT Prophylaxis: Enoxaparin subcutaneously    Code Status: Full Code    Disposition: Expected discharge in 2-3 days   Elizabeth Alvarez Hospitalist    Interval History   6.11.2018: erythema and pain  substantially improved. Still not ready to go home. Will keep another day. Possibly will dc home tomorrow  6.12.2018: checked for gout       Line/device assessment(s) completed for medical necessity June 11    Physical Exam   Temp: 97.3  F (36.3  C) Temp src: Tympanic BP: 136/64 Pulse: 84 Heart Rate: 76 Resp: 20 SpO2: 96 % O2 Device: None (Room air)    Vitals:    06/09/18 1751 06/09/18 2002 06/10/18 0528   Weight: 110.7 kg (244 lb) 114.1 kg (251 lb 8.7 oz) 113.1 kg (249 lb 5.4 oz)     Vital Signs with Ranges  Temp:  [97  F (36.1  C)-99.7  F (37.6  C)] 97.3  F (36.3  C)  Pulse:  [84-90] 84  Heart Rate:  [76-88] 76  Resp:  [18-20] 20  BP: (129-181)/(60-90) 136/64  SpO2:  [95 %-100 %] 96 %  I/O last 3 completed shifts:  In: 910 [I.V.:910]   Out: 925 [Urine:925]    General: awake, alert, not in any distress   Heent: MMM. No lesions  Cardiac: regular, no murmurs  Lungs; clear bilaterally, good air entry - exit  Abdomen: Soft. No palpation or percussion tenderness. No distention. Normoactive bowel sounds. Liver and spleen not increased in size. No pulsatile masses.  Extremities: Right leg with erythema and increased warmth.  Several areas of superficial pustules anteriorly which were present yesterday, dried now.  Skin overall intact.  Edema, pain and redness continue to decrease.   Easily palpable peripheral pulses.  Brisk capillary refill. Skin discoloration LLE due to chronic venous insuff. Very tender when I touch Malleolus lateralis, but XR is quite benign  Neurologic: not done, not indicated    Medications       aspirin chewable tablet 162 mg  162 mg Oral QPM     cefTRIAXone  1 g Intravenous Q24H     enoxaparin  40 mg Subcutaneous Q24H     insulin aspart  1-10 Units Subcutaneous TID AC     insulin aspart  1-7 Units Subcutaneous At Bedtime     insulin degludec  110 Units Subcutaneous Daily     simvastatin (ZOCOR) tablet 20 mg  20 mg Oral QPM     vancomycin (VANCOCIN) IV  1,500 mg Intravenous Q12H           Data      Noted, leukocytosis deceasing.     Lactic Acid   Date Value Ref Range Status   06/09/2018 1.2 0.4 - 2.0 mmol/L Final       Recent Results (from the past 24 hour(s))   US Lower Extremity Venous Duplex Right    Narrative    US LOWER EXTREMITY VENOUS DUPLEX RIGHT  6/9/2018 7:59 PM    History:Male, age 73 years SWELLING AND PAIN, ERYTHEMA, NO H/O DVT;     Comparison: None.    Technique: Grayscale and color Doppler ultrasound of the right  lower  extremity deep venous structures.    Findings:   The deep venous structures are patent and fully compressible. There is  normal augmentation of flow.      Impression    Impression:  1.  No evidence of DVT.    AIXA BURNS MD

## 2018-06-12 NOTE — PLAN OF CARE
"Problem: Patient Care Overview  Goal: Plan of Care/Patient Progress Review  Outcome: No Change  Reason for hospital stay:  Cellulitis    Most recent vitals: /70  Pulse 84  Temp 98.7  F (37.1  C) (Temporal)  Resp 16  Ht 1.753 m (5' 9\")  Wt 111.8 kg (246 lb 7.6 oz)  SpO2 94%  BMI 36.4 kg/m2  Pain Management:  Denies pain if not moving, Declines any interventions for pain  Orientation:  A&O  Cardiac:  wnl  Respiratory:  LS clear. Denies sob. No use of O2.  GI:  BS active. Denies nausea.   :  Voids without difficulty, uses urinal at times  Diet: Reg. BG 72 and 172, administered ordered coverage per sliding scale  Skin Issues:  RLE red/purple/kenia. Edema +2 to calf/shin and trace to ankle and foot. Denies numbness/tingling.  IVF:  Saline Locked  ABX:  Rocephin and Vanco discontinued, po cleocin started  Mobility:  Stand by assist with walker. Encouraged to get up to chair with decline. Elevates leg with pillows in bed  Safety:  Call light within reach.    Comments: Ankle xray done    6/12/2018  1:09 PM  Shira Kim          "

## 2018-06-12 NOTE — PLAN OF CARE
Problem: Patient Care Overview  Goal: Plan of Care/Patient Progress Review  Outcome: No Change  Continues on IV abtx. Pt states RLE is much improved with swelling less and redness less. Temp max this shift: 99.5. Appetite good. Reports pain but declines pain med.     Face to face report given with opportunity to observe patient.    Report given to Alejandra Hidalgo   6/11/2018  11:31 PM

## 2018-06-12 NOTE — PHARMACY
[facility]    Pharmacy      Antimicrobial Stewardship Note     Current antimicrobial therapy:  Anti-infectives (Future)    Start     Dose/Rate Route Frequency Ordered Stop    18 1400  clindamycin (CLEOCIN) capsule 300 mg      300 mg Oral EVERY 8 HOURS SCHEDULED 18 0952            Indication: Skin and Soft Tissue Infection     Days of Therapy: 1     Pertinent labs:  Creatinine   Creatinine   Date Value Ref Range Status   2018 0.92 0.66 - 1.25 mg/dL Final   2018 0.97 0.66 - 1.25 mg/dL Final   06/10/2018 0.95 0.66 - 1.25 mg/dL Final     WBC   WBC   Date Value Ref Range Status   2018 13.0 (H) 4.0 - 11.0 10e9/L Final   06/10/2018 13.8 (H) 4.0 - 11.0 10e9/L Final   2018 14.5 (H) 4.0 - 11.0 10e9/L Final     Procalcitonin   Procalcitonin   Date Value Ref Range Status   2018 <0.05 ng/ml Final     Comment:     <0.05 ng/ml  Normal  Recommendation: Very low risk of bacterial infection.   Discourage antibiotics unless strong clinical suspicion for serious infection.     06/10/2018 <0.05 ng/ml Final     Comment:     <0.05 ng/ml  Normal  Recommendation: Very low risk of bacterial infection.   Discourage antibiotics unless strong clinical suspicion for serious infection.       CRP   CRP Inflammation   Date Value Ref Range Status   2018 36.6 (H) 0.0 - 8.0 mg/L Final   2013 9.1 (H) 0.0 - 3.0 mg/L Final     Comment:      reference ranges have not been established.  C-reactive protein   values   should be interpreted as a comparison of serial measurements.       Culture Results:    Active MRSA Surveillance Culture [[id number]] Collected: 18     Order Status: Completed Lab Status: Final result Updated: 06/10/18 1248     Specimen: Nares from Nose       Specimen Description Nares      Culture Micro No MRSA isolated     Blood culture [[id number]] Collected: 18 1803     Order Status: Completed Lab Status: Preliminary result Updated: 18 0621      Specimen: Blood       Specimen Description Blood Left Arm      Special Requests 10ML EACH      Culture Micro No growth after 3 days     Blood culture      Order Status: Canceled Lab Status: No result      Specimen: Blood      Blood culture [[id number]] Collected: 06/09/18 1748     Order Status: Completed Lab Status: Preliminary result Updated: 06/12/18 0621     Specimen: Right Hand       Specimen Description Right Hand      Culture Micro No growth after 3 days     Active MRSA Surveillance Culture [[id number]] Collected: 06/09/18 0628     Order Status: Completed Lab Status: Final result Updated: 06/11/18 0630     Specimen: Nares from Nose       Specimen Description Nares      Culture Micro Duplicate request       Canceled, Test credited          Recommendations/Interventions:  1. No recommendations at this time.    Navin Mae RPH  June 12, 2018

## 2018-06-12 NOTE — PROGRESS NOTES
Checked in with Duane and Dollie.  He is eager to return home tomorrow.  Hoping to be able to go fishing this weekend.  No questions or concerns.

## 2018-06-12 NOTE — PLAN OF CARE
"Problem: Skin and Soft Tissue Infection (Adult)  Goal: Signs and Symptoms of Listed Potential Problems Will be Absent, Minimized or Managed (Skin and Soft Tissue Infection)  Signs and symptoms of listed potential problems will be absent, minimized or managed by discharge/transition of care (reference Skin and Soft Tissue Infection (Adult) CPG).   Outcome: Improving   06/12/18 0030   Skin and Soft Tissue Infection   Problems Assessed (Skin and Soft Tissue Infection) all   Problems Present (Skin/Soft Tissue Inf) pain     Most Recent Vitals: /73  Pulse 84  Temp 98.7  F (37.1  C) (Tympanic)  Resp 18  Ht 1.753 m (5' 9\")  Wt 111.8 kg (246 lb 7.6 oz)  SpO2 94%  BMI 36.4 kg/m2  Orientation: Alert and oriented  Pain Management: Denies pain while at rest. Some pain in RLE when ambulating  Cardiac: WDL  Respiratory: WDL  GI: Bowels active in all quadrants. Tolerates diet. Passing gas  : WDL  Skin: RLE cellulitis, has somewhat receded from drawn border. Very dark red, kenia, hot. Pink towards border.  IV Fluids: Saline locked  ABX: Vanco  Mobility: SBA with cane. Steady gait  Safety: Calls appropriately  Comments:  at approx 0200  Will continue to monitor.      Face to face report given with opportunity to observe patient.    Report given to Shira Noriega   6/12/2018  7:28 AM      "

## 2018-06-13 VITALS
HEIGHT: 69 IN | WEIGHT: 241.62 LBS | HEART RATE: 84 BPM | BODY MASS INDEX: 35.79 KG/M2 | DIASTOLIC BLOOD PRESSURE: 77 MMHG | RESPIRATION RATE: 18 BRPM | OXYGEN SATURATION: 95 % | SYSTOLIC BLOOD PRESSURE: 150 MMHG | TEMPERATURE: 98.1 F

## 2018-06-13 LAB
CREAT SERPL-MCNC: 0.93 MG/DL (ref 0.66–1.25)
GFR SERPL CREATININE-BSD FRML MDRD: 79 ML/MIN/1.7M2
GLUCOSE BLDC GLUCOMTR-MCNC: 80 MG/DL (ref 70–99)
GLUCOSE BLDC GLUCOMTR-MCNC: 92 MG/DL (ref 70–99)

## 2018-06-13 PROCEDURE — 40000893 ZZH STATISTIC PT IP EVAL DEFER

## 2018-06-13 PROCEDURE — 36415 COLL VENOUS BLD VENIPUNCTURE: CPT | Performed by: INTERNAL MEDICINE

## 2018-06-13 PROCEDURE — 25000132 ZZH RX MED GY IP 250 OP 250 PS 637: Performed by: INTERNAL MEDICINE

## 2018-06-13 PROCEDURE — 82565 ASSAY OF CREATININE: CPT | Performed by: INTERNAL MEDICINE

## 2018-06-13 PROCEDURE — 25000128 H RX IP 250 OP 636: Performed by: INTERNAL MEDICINE

## 2018-06-13 PROCEDURE — 00000146 ZZHCL STATISTIC GLUCOSE BY METER IP

## 2018-06-13 PROCEDURE — 99238 HOSP IP/OBS DSCHRG MGMT 30/<: CPT | Performed by: INTERNAL MEDICINE

## 2018-06-13 RX ORDER — CLINDAMYCIN HCL 300 MG
300 CAPSULE ORAL EVERY 8 HOURS
Qty: 9 CAPSULE | Refills: 0 | Status: SHIPPED | OUTPATIENT
Start: 2018-06-13 | End: 2018-06-16

## 2018-06-13 RX ORDER — ACETAMINOPHEN 325 MG/1
650 TABLET ORAL EVERY 6 HOURS PRN
Qty: 100 TABLET | Refills: 0 | Status: SHIPPED | OUTPATIENT
Start: 2018-06-13

## 2018-06-13 RX ADMIN — INSULIN DEGLUDEC INJECTION 110 UNITS: 200 INJECTION, SOLUTION SUBCUTANEOUS at 09:32

## 2018-06-13 RX ADMIN — HEPARIN SODIUM 5000 UNITS: 5000 INJECTION, SOLUTION INTRAVENOUS; SUBCUTANEOUS at 09:32

## 2018-06-13 RX ADMIN — CLINDAMYCIN HYDROCHLORIDE 300 MG: 300 CAPSULE ORAL at 06:28

## 2018-06-13 NOTE — PHARMACY
[facility]    Pharmacy      Antimicrobial Stewardship Note     Current antimicrobial therapy:  Anti-infectives (Future)    Start     Dose/Rate Route Frequency Ordered Stop    18 0000  clindamycin (CLEOCIN) 300 MG capsule      300 mg Oral EVERY 8 HOURS 18 0946 18 2359    18 1400  clindamycin (CLEOCIN) capsule 300 mg      300 mg Oral EVERY 8 HOURS SCHEDULED 18 0952            Indication: Cellulitis    Days of Therapy: 2     Pertinent labs:  Creatinine   Creatinine   Date Value Ref Range Status   2018 0.93 0.66 - 1.25 mg/dL Final   2018 0.92 0.66 - 1.25 mg/dL Final   2018 0.97 0.66 - 1.25 mg/dL Final     WBC   WBC   Date Value Ref Range Status   2018 13.0 (H) 4.0 - 11.0 10e9/L Final   06/10/2018 13.8 (H) 4.0 - 11.0 10e9/L Final   2018 14.5 (H) 4.0 - 11.0 10e9/L Final     Procalcitonin   Procalcitonin   Date Value Ref Range Status   2018 <0.05 ng/ml Final     Comment:     <0.05 ng/ml  Normal  Recommendation: Very low risk of bacterial infection.   Discourage antibiotics unless strong clinical suspicion for serious infection.     06/10/2018 <0.05 ng/ml Final     Comment:     <0.05 ng/ml  Normal  Recommendation: Very low risk of bacterial infection.   Discourage antibiotics unless strong clinical suspicion for serious infection.       CRP   CRP Inflammation   Date Value Ref Range Status   2018 36.6 (H) 0.0 - 8.0 mg/L Final   2013 9.1 (H) 0.0 - 3.0 mg/L Final     Comment:      reference ranges have not been established.  C-reactive protein   values   should be interpreted as a comparison of serial measurements.       Culture Results:   Procedure Component Value Units Date/Time      Active MRSA Surveillance Culture [[id number]] Collected: 18     Order Status: Completed Lab Status: Final result Updated: 06/10/18 1248     Specimen: Nares from Nose       Specimen Description Nares      Culture Micro No MRSA isolated      Blood culture [[id number]] Collected: 06/09/18 1803     Order Status: Completed Lab Status: Preliminary result Updated: 06/13/18 0623     Specimen: Blood       Specimen Description Blood Left Arm      Special Requests 10ML EACH      Culture Micro No growth after 4 days     Blood culture      Order Status: Canceled Lab Status: No result      Specimen: Blood      Blood culture [[id number]] Collected: 06/09/18 1748     Order Status: Completed Lab Status: Preliminary result Updated: 06/13/18 0623     Specimen: Right Hand       Specimen Description Right Hand      Culture Micro No growth after 4 days     Active MRSA Surveillance Culture [[id number]] Collected: 06/09/18 0628     Order Status: Completed Lab Status: Final result Updated: 06/11/18 0630     Specimen: Nares from Nose       Specimen Description Nares      Culture Micro Duplicate request       Canceled, Test credited          Recommendations/Interventions:  1. No recommendations at this time. Patient discharging today.    ADRIANA Lutz Student  June 13, 2018

## 2018-06-13 NOTE — PLAN OF CARE
Problem: Patient Care Overview  Goal: Plan of Care/Patient Progress Review  Client has refused an evaluation. I did reiterate Dr. Deshpande wishes for him to use the FWW when at home. He has agreed and does not want any PT follow up.

## 2018-06-13 NOTE — PLAN OF CARE
Problem: Patient Care Overview  Goal: Discharge Needs Assessment  Outcome: Adequate for Discharge Date Met: 06/13/18  Patient discharged at 12:25 PM via wheel chair accompanied by significant other and staff. Prescriptions sent to patients preferred pharmacy. All belongings sent with patient.     Discharge instructions reviewed with Duane. All belongings gathered and returned to patient.     Patient discharged to home.   Report called to N/A    Core Measures and Vaccines  Core Measures applicable during stay: No. If yes, state diagnosis: N/A  Pneumonia and Influenza given prior to discharge, if indicated: N/A    Surgical Patient   Surgical Procedures during stay: no  Did patient receive discharge instruction on wound care and recognition of infection symptoms? N/A    MISC  Follow up appointment made:  Yes  Home and hospital aquired medications returned to patient: Yes  Patient reports pain was well managed at discharge: Yes

## 2018-06-13 NOTE — PLAN OF CARE
"Problem: Patient Care Overview  Goal: Plan of Care/Patient Progress Review  Outcome: Improving  Reason for hospital stay:  RLE Cellulitis    Most recent vitals: /71  Pulse 84  Temp 98.7  F (37.1  C) (Oral)  Resp 16  Ht 1.753 m (5' 9\")  Wt 111.8 kg (246 lb 7.6 oz)  SpO2 97%  BMI 36.4 kg/m2    Pain Management:  Denies any pain except when trying to bear weight on RLE    Orientation:  A+O x4    Skin Issues:  RLE hot and blanchable red and dark red cellulitic area remains receding from marked borders.     IVF:  SL    ABX:  Continues on PO Clindamycin    Mobility:  Uses walker and does not bear any weight on RLE. Up in chair for supper and walked in room and in hallway using walker and standby assist. RLE elevated as much as possible while in bed.      Safety:  Alarms on        Face to face report given with opportunity to observe patient.    Report given to Chen Connell   6/12/2018  11:45 PM             06/12/18 1207   OTHER   Plan Of Care Reviewed With patient   Plan of Care Review   Progress no change       Problem: Skin and Soft Tissue Infection (Adult)  Goal: Signs and Symptoms of Listed Potential Problems Will be Absent, Minimized or Managed (Skin and Soft Tissue Infection)  Signs and symptoms of listed potential problems will be absent, minimized or managed by discharge/transition of care (reference Skin and Soft Tissue Infection (Adult) CPG).   Outcome: Improving   06/12/18 1720   Skin and Soft Tissue Infection   Problems Assessed (Skin and Soft Tissue Infection) all   Problems Present (Skin/Soft Tissue Inf) pain         "

## 2018-06-13 NOTE — PLAN OF CARE
Problem: Patient Care Overview  Goal: Plan of Care/Patient Progress Review  Outcome: No Change  A/O, VSS. RLE reddened/kenia, warm to touch continues to recede from marked borders. Denies pain at rest. Pt states he is anxious to bear weight on RLE d/t pain upon ambulation/movement. Ambulated to bathroom this shift. Pt stated he feels better today. Rec'd po clindomycin. Uses call light appropriately, makes needs known. IV SL.       06/13/18 0514   OTHER   Plan Of Care Reviewed With patient   Plan of Care Review   Progress no change       Problem: Skin and Soft Tissue Infection (Adult)  Goal: Signs and Symptoms of Listed Potential Problems Will be Absent, Minimized or Managed (Skin and Soft Tissue Infection)  Signs and symptoms of listed potential problems will be absent, minimized or managed by discharge/transition of care (reference Skin and Soft Tissue Infection (Adult) CPG).   Outcome: No Change   06/13/18 0514   Skin and Soft Tissue Infection   Problems Assessed (Skin and Soft Tissue Infection) all   Problems Present (Skin/Soft Tissue Inf) pain

## 2018-06-13 NOTE — DISCHARGE SUMMARY
[facility]    Discharge Summary  Hospitalist    Date of Admission:  6/9/2018  Date of Discharge:  6/13/2018  Discharging Provider: Nara Gracia  Date of Service (when I saw the patient): 06/13/18    Discharge Diagnoses   Right LE cellulitis (primary)  DM-2  Essential HTN  Dyslipidemia  R ankle pain      History of Present Illness   Duane E Arola is an 73 year old male who presented with RLE redness/swelling and pain.   Duane E Arola is a 73 year old male with history of hypertension, hyperlipidemia, type 2 diabetes mellitus who underwent lap cholecystectomy about a month ago presented to the ED with a complaint of cellulitis of his right leg going on for 11 days.  The patient started having redness and swelling of the right leg 11 days ago.  He went to his primary care physician when he was prescribed 10 days course of Keflex.  He was getting slightly better but about 2 days ago the leg has been getting more swollen with redness also has worsening pain that he could not walk today.  He had to come to the ED for further evaluation.  The patient denies any chest pain or shortness of breath.  He denies fevers or chills.  No nausea or vomiting (from admitting physician HPI)    Hospital Course   Duane E Arola was admitted on 6/9/2018. He failed OP Abx treatment with cephalexin. On admission to , he was started on vancomycin and Rocephin and started improving. 3 days prior to discharge, he was started on clindamycin with no relapse. The next day after discharge, procalcitonin was lower detectable level.   During hospital stay we r/o gout and R ankle problems - initially he refused to bear the weight. PT was involved and he gradually started ambulating with w bearing. He refused to accept home PT but agreed to use walker with ambulation and elevate his LE when resting  During this hospital stay, DVT was r/o and blood cx obtained. Final blood cx still pending. Recommend to primary to follow with next OP  "visit.   The following problems were addressed during his hospitalization:    Right LE cellulitis (primary)  - treated with rocephin/vancomycin and clindamycin.   - dc on clindamycin 400 mg po tid for 3 more days  DM-2  - treated with home insulin regimen  Essential HTN  - treated and controlled  Dyslipidemia  - statins continued  R ankle pain  - XR done, \"old fracture deformity\" found on XR; uric acid 3.5      Arvydas Urbonas    Significant Results and Procedures   LE venous duplex = -ve for DVT  XR - R ankle - no acute injuries  Blood cultures: pending.     Pending Results   These results will be followed up by Primary  Unresulted Labs Ordered in the Past 30 Days of this Admission     Date and Time Order Name Status Description    6/9/2018 1751 Blood culture Preliminary     6/9/2018 1750 Blood culture Preliminary           Code Status   Full Code       Primary Care Physician   Donavan Melgar    Physical Exam   Temp: 98.1  F (36.7  C) Temp src: Tympanic BP: 150/77   Heart Rate: 87 Resp: 18 SpO2: 95 % O2 Device: None (Room air)    Vitals:    06/10/18 0528 06/11/18 0540 06/13/18 0541   Weight: 113.1 kg (249 lb 5.4 oz) 111.8 kg (246 lb 7.6 oz) 109.6 kg (241 lb 10 oz)     Vital Signs with Ranges  Temp:  [98.1  F (36.7  C)-98.8  F (37.1  C)] 98.1  F (36.7  C)  Heart Rate:  [86-87] 87  Resp:  [16-18] 18  BP: (131-165)/(66-77) 150/77  SpO2:  [94 %-97 %] 95 %  I/O last 3 completed shifts:  In: 720 [P.O.:720]  Out: 2525 [Urine:2525]    Not in distress  Regular hear rate  Lungs clear bilaterally  GI: soft non-tender abdomen  : no CVA tenderness  MS/skin: erythema redness decreasing. Also - edema significantly decreased. (Please see the picture of the RLE and compare to how the leg looks like on follow up)    # Discharge Pain Plan:   - During his hospitalization, Duane experienced pain due to cellulitis.  The pain plan for discharge was discussed with Duane and the plan was created in a collaborative fashion.    - tylenol " will be the pain medications Duane will be taking.      Discharge Disposition   Discharged to home  Condition at discharge: Stable    Consultations This Hospital Stay   PHARMACY TO DOSE [facility]  PHARMACY TO DOSE [facility] SERVICES IP CONSULT  PHYSICAL THERAPY ADULT IP CONSULT    Time Spent on this Encounter   Nara FRENCH, personally saw the patient today and spent less than or equal to 30 minutes discharging this patient.    Discharge Orders     Reason for your hospital stay   Pt treated for RLE cellulitis with IV abx (failed OP oral Abx treatment).     Activity   Your activity upon discharge: activity as tolerated and elevate the RLE when resting. Use walker with activity     When to contact your care team   Call primary  if you have any of the following: temp >=101.4, pain and redness returns.     Discharge Instructions     Follow-up and recommended labs and tests    Follow up with primary care provider, Donavan Melgar, within 5 days to evaluate medication change and for hospital follow- up.  Primary will follow blood cultures final results     Full Code     Diet   Follow this diet upon discharge: Orders Placed This Encounter     Combination Diet Regular Diet Adult       Discharge Medications   Current Discharge Medication List      START taking these medications    Details   acetaminophen (TYLENOL) 325 MG tablet Take 2 tablets (650 mg) by mouth every 6 hours as needed for mild pain  Qty: 100 tablet, Refills: 0    Associated Diagnoses: Cellulitis of right lower extremity      clindamycin (CLEOCIN) 300 MG capsule Take 1 capsule (300 mg) by mouth every 8 hours for 3 days  Qty: 9 capsule, Refills: 0    Associated Diagnoses: Cellulitis of right lower extremity         CONTINUE these medications which have NOT CHANGED    Details   ASPIRIN PO Take 162 mg by mouth daily       Cranberry 1000 MG CAPS       garlic 150 MG TABS Take 150 mg by mouth daily.      insulin degludec (TRESIBA FLEXTOUCH) 200 UNIT/ML  pen Inject 110 Units Subcutaneous daily  Qty: 15 mL, Refills: 3    Associated Diagnoses: Type 2 diabetes mellitus with hyperglycemia, with long-term current use of insulin (H)      !! insulin pen needle (BD CK U/F) 32G X 4 MM Use 2 pen needles daily.  Qty: 200 each, Refills: 6    Associated Diagnoses: Diabetes mellitus, type 2 (H)      !! insulin pen needle (TOPCARE CLICKFINE PEN NEEDLES) 31G X 6 MM Use pen needles daily or as directed.  Qty: 100 each, Refills: 12    Associated Diagnoses: Type 2 diabetes mellitus with hyperglycemia (H)      Multiple Vitamins-Minerals (CENTRUM SILVER) per tablet Take 1 tablet by mouth daily.      Pioglitazone HCl-Glimepiride 30-2 MG TABS Take 1 tablet by mouth daily      SIMVASTATIN PO Take 20 mg by mouth daily      hypromellose (ARTIFICIAL TEARS) 0.5 % SOLN ophthalmic solution 1 drop every hour as needed for dry eyes       !! - Potential duplicate medications found. Please discuss with provider.      STOP taking these medications       CEPHALEXIN PO Comments:   Reason for Stopping:             Allergies   Allergies   Allergen Reactions     Metformin      Legs get weak     Seafood Hives     Data   Most Recent 3 CBC's:  Recent Labs   Lab Test  06/11/18   0525  06/10/18   0922  06/09/18   1748   WBC  13.0*  13.8*  14.5*   HGB  11.8*  11.7*  11.4*   MCV  92  91  90   PLT  365  366  375      Most Recent 3 BMP's:  Recent Labs   Lab Test  06/13/18   0527  06/12/18 0518  06/11/18   0525   06/09/18   1748  03/16/18   0631   NA   --    --   139   --   138  138   POTASSIUM   --    --   4.4   --   4.4  4.2   CHLORIDE   --    --   105   --   103  104   CO2   --    --   27   --   28  29   BUN   --    --   17   --   23  19   CR  0.93  0.92  0.97   < >  0.98  0.97   ANIONGAP   --    --   7   --   7  5   BUSTER   --    --   8.9   --   8.8  8.1*   GLC   --    --   104*   --   142*  249*    < > = values in this interval not displayed.     Most Recent 2 LFT's:  Recent Labs   Lab Test  06/09/18 1748   03/16/18   0631   AST  28  39   ALT  38  45   ALKPHOS  80  43   BILITOTAL  0.4  0.4     Most Recent INR's and Anticoagulation Dosing History:  Anticoagulation Dose History     Recent Dosing and Labs Latest Ref Rng & Units 6/30/2013    INR 0.80 - 1.20 1.17        Most Recent 3 Troponin's:  Recent Labs   Lab Test  03/16/18   0914  03/16/18   0631   TROPI  0.033  0.034     Most Recent Cholesterol Panel:  Recent Labs   Lab Test 10/04/17   CHOL  108   LDL  49   HDL  38   TRIG  105     Most Recent 6 Bacteria Isolates From Any Culture (See EPIC Reports for Culture Details):  Recent Labs   Lab Test  06/09/18 2010 06/09/18   1803  06/09/18   1748  06/09/18   0628  06/30/13   1045  06/30/13   1040   CULT  No MRSA isolated  No growth after 4 days  No growth after 4 days  Duplicate request  Canceled, Test credited  No growth after 6 days  No growth after 6 days     Most Recent TSH, T4 and A1c Labs:  Recent Labs   Lab Test  06/09/18   1748  10/04/17   TSH   --    --   1.710   A1C  6.9*   < >  9.3*    < > = values in this interval not displayed.     Results for orders placed or performed during the hospital encounter of 06/09/18   US Lower Extremity Venous Duplex Right    Narrative    US LOWER EXTREMITY VENOUS DUPLEX RIGHT  6/9/2018 7:59 PM    History:Male, age 73 years SWELLING AND PAIN, ERYTHEMA, NO H/O DVT;     Comparison: None.    Technique: Grayscale and color Doppler ultrasound of the right  lower  extremity deep venous structures.    Findings:   The deep venous structures are patent and fully compressible. There is  normal augmentation of flow.      Impression    Impression:  1.  No evidence of DVT.    AIXA BURNS MD   XR Ankle Port Right G/E 3 Views    Narrative    PROCEDURE:  XR ANKLE PORT RT G/E 3 VW    HISTORY: pain R ankle and + RLE cellulitis;     COMPARISON:  None.    TECHNIQUE:  3 views of the right ankle were obtained.    FINDINGS:  There is deformity of the distal fibula consistent with old  healed  fracture. No acute fractures or destructive lesions are noted.  There is minimal spurring at the insertion of the plantar fascia into  the calcaneus.       Impression    IMPRESSION: Mild calcaneal spur.      MYLES LANGE MD

## 2018-06-13 NOTE — PLAN OF CARE
Face to face report given with opportunity to observe patient.    Report given to Maritza Carvalho   6/13/2018  7:05 AM

## 2018-06-13 NOTE — PROGRESS NOTES
Checked in with Duane.  He states Marlee is on her way.  No concerns identified.      Name: Duane E Arola    MRN#: [id number]    Reason for Hospitalization: Cellulitis of right lower extremity [L03.115]    YELENA: average    Discharge Date: 6/13/2018    Patient / Family response to discharge plan: agreeable    Follow-Up Appt: No future appointments.    Other Providers (Care Coordinator, County Services, PCA services etc): No    Discharge Disposition: home    Shira Huber

## 2018-06-15 LAB
BACTERIA SPEC CULT: NORMAL
BACTERIA SPEC CULT: NORMAL
Lab: NORMAL
SPECIMEN SOURCE: NORMAL
SPECIMEN SOURCE: NORMAL

## 2018-07-06 ENCOUNTER — TELEPHONE (OUTPATIENT)
Dept: CASE MANAGEMENT | Facility: HOSPITAL | Age: 73
End: 2018-07-06

## 2018-07-09 ENCOUNTER — TELEPHONE (OUTPATIENT)
Dept: CASE MANAGEMENT | Facility: HOSPITAL | Age: 73
End: 2018-07-09

## 2019-09-06 ENCOUNTER — HOSPITAL ENCOUNTER (INPATIENT)
Facility: HOSPITAL | Age: 74
LOS: 4 days | Discharge: HOME | DRG: 603 | End: 2019-09-10
Attending: PHYSICIAN ASSISTANT | Admitting: INTERNAL MEDICINE
Payer: COMMERCIAL

## 2019-09-06 DIAGNOSIS — L03.115 CELLULITIS OF RIGHT LOWER EXTREMITY: ICD-10-CM

## 2019-09-06 PROBLEM — I10 BENIGN ESSENTIAL HYPERTENSION: Status: ACTIVE | Noted: 2019-09-06

## 2019-09-06 LAB
ALBUMIN SERPL-MCNC: 4 G/DL (ref 3.4–5)
ALBUMIN UR-MCNC: 10 MG/DL
ALP SERPL-CCNC: 60 U/L (ref 40–150)
ALT SERPL W P-5'-P-CCNC: 38 U/L (ref 0–70)
ANION GAP SERPL CALCULATED.3IONS-SCNC: 7 MMOL/L (ref 3–14)
APPEARANCE UR: CLEAR
AST SERPL W P-5'-P-CCNC: 26 U/L (ref 0–45)
BACTERIA #/AREA URNS HPF: ABNORMAL /HPF
BASOPHILS # BLD AUTO: 0.1 10E9/L (ref 0–0.2)
BASOPHILS NFR BLD AUTO: 0.4 %
BILIRUB SERPL-MCNC: 0.6 MG/DL (ref 0.2–1.3)
BILIRUB UR QL STRIP: NEGATIVE
BUN SERPL-MCNC: 22 MG/DL (ref 7–30)
CALCIUM SERPL-MCNC: 8.9 MG/DL (ref 8.5–10.1)
CHLORIDE SERPL-SCNC: 104 MMOL/L (ref 94–109)
CO2 SERPL-SCNC: 29 MMOL/L (ref 20–32)
COLOR UR AUTO: YELLOW
CREAT SERPL-MCNC: 0.91 MG/DL (ref 0.66–1.25)
CREAT SERPL-MCNC: 0.98 MG/DL (ref 0.66–1.25)
DIFFERENTIAL METHOD BLD: ABNORMAL
EOSINOPHIL # BLD AUTO: 0.1 10E9/L (ref 0–0.7)
EOSINOPHIL NFR BLD AUTO: 0.5 %
ERYTHROCYTE [DISTWIDTH] IN BLOOD BY AUTOMATED COUNT: 13.4 % (ref 10–15)
GFR SERPL CREATININE-BSD FRML MDRD: 75 ML/MIN/{1.73_M2}
GFR SERPL CREATININE-BSD FRML MDRD: 82 ML/MIN/{1.73_M2}
GLUCOSE BLDC GLUCOMTR-MCNC: 178 MG/DL (ref 70–99)
GLUCOSE BLDC GLUCOMTR-MCNC: 85 MG/DL (ref 70–99)
GLUCOSE SERPL-MCNC: 126 MG/DL (ref 70–99)
GLUCOSE UR STRIP-MCNC: NEGATIVE MG/DL
HCT VFR BLD AUTO: 40.9 % (ref 40–53)
HGB BLD-MCNC: 13.7 G/DL (ref 13.3–17.7)
HGB UR QL STRIP: NEGATIVE
HYALINE CASTS #/AREA URNS LPF: 1 /LPF
IMM GRANULOCYTES # BLD: 0.1 10E9/L (ref 0–0.4)
IMM GRANULOCYTES NFR BLD: 0.6 %
KETONES UR STRIP-MCNC: NEGATIVE MG/DL
LACTATE BLD-SCNC: 1.8 MMOL/L (ref 0.7–2)
LACTATE BLD-SCNC: 2.3 MMOL/L (ref 0.7–2)
LEUKOCYTE ESTERASE UR QL STRIP: NEGATIVE
LYMPHOCYTES # BLD AUTO: 0.9 10E9/L (ref 0.8–5.3)
LYMPHOCYTES NFR BLD AUTO: 5.4 %
MCH RBC QN AUTO: 30.2 PG (ref 26.5–33)
MCHC RBC AUTO-ENTMCNC: 33.5 G/DL (ref 31.5–36.5)
MCV RBC AUTO: 90 FL (ref 78–100)
MONOCYTES # BLD AUTO: 1.4 10E9/L (ref 0–1.3)
MONOCYTES NFR BLD AUTO: 8.8 %
MUCOUS THREADS #/AREA URNS LPF: PRESENT /LPF
NEUTROPHILS # BLD AUTO: 13.6 10E9/L (ref 1.6–8.3)
NEUTROPHILS NFR BLD AUTO: 84.3 %
NITRATE UR QL: NEGATIVE
NRBC # BLD AUTO: 0 10*3/UL
NRBC BLD AUTO-RTO: 0 /100
PH UR STRIP: 8 PH (ref 4.7–8)
PLATELET # BLD AUTO: 236 10E9/L (ref 150–450)
POTASSIUM SERPL-SCNC: 4.1 MMOL/L (ref 3.4–5.3)
PROCALCITONIN SERPL-MCNC: 0.22 NG/ML
PROT SERPL-MCNC: 8.2 G/DL (ref 6.8–8.8)
RBC # BLD AUTO: 4.54 10E12/L (ref 4.4–5.9)
RBC #/AREA URNS AUTO: <1 /HPF (ref 0–2)
SODIUM SERPL-SCNC: 140 MMOL/L (ref 133–144)
SOURCE: ABNORMAL
SP GR UR STRIP: 1.02 (ref 1–1.03)
UROBILINOGEN UR STRIP-MCNC: NORMAL MG/DL (ref 0–2)
WBC # BLD AUTO: 16.1 10E9/L (ref 4–11)
WBC #/AREA URNS AUTO: 2 /HPF (ref 0–5)

## 2019-09-06 PROCEDURE — 25000132 ZZH RX MED GY IP 250 OP 250 PS 637: Performed by: PHYSICIAN ASSISTANT

## 2019-09-06 PROCEDURE — 00000146 ZZHCL STATISTIC GLUCOSE BY METER IP

## 2019-09-06 PROCEDURE — 99285 EMERGENCY DEPT VISIT HI MDM: CPT | Mod: 25

## 2019-09-06 PROCEDURE — 25800030 ZZH RX IP 258 OP 636: Performed by: INTERNAL MEDICINE

## 2019-09-06 PROCEDURE — 80053 COMPREHEN METABOLIC PANEL: CPT | Performed by: FAMILY MEDICINE

## 2019-09-06 PROCEDURE — 96365 THER/PROPH/DIAG IV INF INIT: CPT

## 2019-09-06 PROCEDURE — 25800030 ZZH RX IP 258 OP 636: Performed by: PHYSICIAN ASSISTANT

## 2019-09-06 PROCEDURE — 12000000 ZZH R&B MED SURG/OB

## 2019-09-06 PROCEDURE — 83605 ASSAY OF LACTIC ACID: CPT | Performed by: PHYSICIAN ASSISTANT

## 2019-09-06 PROCEDURE — 40000786 ZZHCL STATISTIC ACTIVE MRSA SURVEILLANCE CULTURE: Performed by: INTERNAL MEDICINE

## 2019-09-06 PROCEDURE — 83605 ASSAY OF LACTIC ACID: CPT | Performed by: FAMILY MEDICINE

## 2019-09-06 PROCEDURE — 96361 HYDRATE IV INFUSION ADD-ON: CPT

## 2019-09-06 PROCEDURE — 85025 COMPLETE CBC W/AUTO DIFF WBC: CPT | Performed by: FAMILY MEDICINE

## 2019-09-06 PROCEDURE — 81001 URINALYSIS AUTO W/SCOPE: CPT | Performed by: PHYSICIAN ASSISTANT

## 2019-09-06 PROCEDURE — 99284 EMERGENCY DEPT VISIT MOD MDM: CPT | Mod: Z6 | Performed by: PHYSICIAN ASSISTANT

## 2019-09-06 PROCEDURE — 87040 BLOOD CULTURE FOR BACTERIA: CPT | Performed by: PHYSICIAN ASSISTANT

## 2019-09-06 PROCEDURE — 99222 1ST HOSP IP/OBS MODERATE 55: CPT | Performed by: INTERNAL MEDICINE

## 2019-09-06 PROCEDURE — 82565 ASSAY OF CREATININE: CPT | Performed by: INTERNAL MEDICINE

## 2019-09-06 PROCEDURE — 84145 PROCALCITONIN (PCT): CPT | Performed by: PHYSICIAN ASSISTANT

## 2019-09-06 PROCEDURE — 36415 COLL VENOUS BLD VENIPUNCTURE: CPT | Performed by: PHYSICIAN ASSISTANT

## 2019-09-06 PROCEDURE — 25000128 H RX IP 250 OP 636: Performed by: PHYSICIAN ASSISTANT

## 2019-09-06 PROCEDURE — 25000132 ZZH RX MED GY IP 250 OP 250 PS 637: Performed by: INTERNAL MEDICINE

## 2019-09-06 PROCEDURE — 25000128 H RX IP 250 OP 636: Performed by: INTERNAL MEDICINE

## 2019-09-06 PROCEDURE — 96375 TX/PRO/DX INJ NEW DRUG ADDON: CPT

## 2019-09-06 RX ORDER — LIDOCAINE 40 MG/G
CREAM TOPICAL
Status: DISCONTINUED | OUTPATIENT
Start: 2019-09-06 | End: 2019-09-07

## 2019-09-06 RX ORDER — LIDOCAINE 40 MG/G
CREAM TOPICAL
Status: DISCONTINUED | OUTPATIENT
Start: 2019-09-06 | End: 2019-09-10 | Stop reason: HOSPADM

## 2019-09-06 RX ORDER — PIOGLITAZONEHYDROCHLORIDE 30 MG/1
30 TABLET ORAL DAILY
Status: DISCONTINUED | OUTPATIENT
Start: 2019-09-07 | End: 2019-09-10 | Stop reason: HOSPADM

## 2019-09-06 RX ORDER — NALOXONE HYDROCHLORIDE 0.4 MG/ML
.1-.4 INJECTION, SOLUTION INTRAMUSCULAR; INTRAVENOUS; SUBCUTANEOUS
Status: DISCONTINUED | OUTPATIENT
Start: 2019-09-06 | End: 2019-09-10 | Stop reason: HOSPADM

## 2019-09-06 RX ORDER — AMOXICILLIN 250 MG
2 CAPSULE ORAL 2 TIMES DAILY
Status: DISCONTINUED | OUTPATIENT
Start: 2019-09-06 | End: 2019-09-10 | Stop reason: HOSPADM

## 2019-09-06 RX ORDER — ONDANSETRON 4 MG/1
4 TABLET, ORALLY DISINTEGRATING ORAL EVERY 6 HOURS PRN
Status: DISCONTINUED | OUTPATIENT
Start: 2019-09-06 | End: 2019-09-10 | Stop reason: HOSPADM

## 2019-09-06 RX ORDER — NICOTINE POLACRILEX 4 MG
15-30 LOZENGE BUCCAL
Status: DISCONTINUED | OUTPATIENT
Start: 2019-09-06 | End: 2019-09-10 | Stop reason: HOSPADM

## 2019-09-06 RX ORDER — AMOXICILLIN 250 MG
2 CAPSULE ORAL 2 TIMES DAILY PRN
Status: DISCONTINUED | OUTPATIENT
Start: 2019-09-06 | End: 2019-09-10 | Stop reason: HOSPADM

## 2019-09-06 RX ORDER — DEXTROSE MONOHYDRATE 25 G/50ML
25-50 INJECTION, SOLUTION INTRAVENOUS
Status: DISCONTINUED | OUTPATIENT
Start: 2019-09-06 | End: 2019-09-10 | Stop reason: HOSPADM

## 2019-09-06 RX ORDER — PIOGLITAZONE AND GLIMEPIRIDE 30; 2 MG/1; MG/1
1 TABLET ORAL DAILY
Status: DISCONTINUED | OUTPATIENT
Start: 2019-09-06 | End: 2019-09-06 | Stop reason: RX

## 2019-09-06 RX ORDER — AMOXICILLIN 250 MG
1 CAPSULE ORAL 2 TIMES DAILY
Status: DISCONTINUED | OUTPATIENT
Start: 2019-09-06 | End: 2019-09-10 | Stop reason: HOSPADM

## 2019-09-06 RX ORDER — ACETAMINOPHEN 325 MG/1
975 TABLET ORAL ONCE
Status: COMPLETED | OUTPATIENT
Start: 2019-09-06 | End: 2019-09-06

## 2019-09-06 RX ORDER — ONDANSETRON 2 MG/ML
4 INJECTION INTRAMUSCULAR; INTRAVENOUS EVERY 6 HOURS PRN
Status: DISCONTINUED | OUTPATIENT
Start: 2019-09-06 | End: 2019-09-10 | Stop reason: HOSPADM

## 2019-09-06 RX ORDER — GLIMEPIRIDE 2 MG/1
2 TABLET ORAL DAILY
Status: DISCONTINUED | OUTPATIENT
Start: 2019-09-07 | End: 2019-09-10 | Stop reason: HOSPADM

## 2019-09-06 RX ORDER — HYDROMORPHONE HYDROCHLORIDE 1 MG/ML
0.5 INJECTION, SOLUTION INTRAMUSCULAR; INTRAVENOUS; SUBCUTANEOUS
Status: COMPLETED | OUTPATIENT
Start: 2019-09-06 | End: 2019-09-06

## 2019-09-06 RX ORDER — IBUPROFEN 600 MG/1
600 TABLET, FILM COATED ORAL EVERY 6 HOURS PRN
Status: DISCONTINUED | OUTPATIENT
Start: 2019-09-06 | End: 2019-09-10 | Stop reason: HOSPADM

## 2019-09-06 RX ORDER — AMOXICILLIN 250 MG
1 CAPSULE ORAL 2 TIMES DAILY PRN
Status: DISCONTINUED | OUTPATIENT
Start: 2019-09-06 | End: 2019-09-10 | Stop reason: HOSPADM

## 2019-09-06 RX ORDER — SODIUM CHLORIDE 9 MG/ML
INJECTION, SOLUTION INTRAVENOUS CONTINUOUS
Status: DISCONTINUED | OUTPATIENT
Start: 2019-09-06 | End: 2019-09-10 | Stop reason: HOSPADM

## 2019-09-06 RX ORDER — ACETAMINOPHEN 325 MG/1
975 TABLET ORAL EVERY 6 HOURS PRN
Status: DISCONTINUED | OUTPATIENT
Start: 2019-09-06 | End: 2019-09-10 | Stop reason: HOSPADM

## 2019-09-06 RX ORDER — ASPIRIN 81 MG/1
162 TABLET, CHEWABLE ORAL DAILY
Status: DISCONTINUED | OUTPATIENT
Start: 2019-09-06 | End: 2019-09-10 | Stop reason: HOSPADM

## 2019-09-06 RX ORDER — SIMVASTATIN 20 MG
20 TABLET ORAL AT BEDTIME
Status: DISCONTINUED | OUTPATIENT
Start: 2019-09-06 | End: 2019-09-10 | Stop reason: HOSPADM

## 2019-09-06 RX ORDER — CEFAZOLIN SODIUM 1 G/50ML
2000 SOLUTION INTRAVENOUS ONCE
Status: COMPLETED | OUTPATIENT
Start: 2019-09-06 | End: 2019-09-06

## 2019-09-06 RX ORDER — CEFAZOLIN SODIUM 1 G/50ML
1750 SOLUTION INTRAVENOUS
Status: DISCONTINUED | OUTPATIENT
Start: 2019-09-07 | End: 2019-09-08

## 2019-09-06 RX ADMIN — SODIUM CHLORIDE 1000 ML: 9 INJECTION, SOLUTION INTRAVENOUS at 16:24

## 2019-09-06 RX ADMIN — IBUPROFEN 600 MG: 600 TABLET ORAL at 20:30

## 2019-09-06 RX ADMIN — ONDANSETRON 4 MG: 2 INJECTION INTRAMUSCULAR; INTRAVENOUS at 20:30

## 2019-09-06 RX ADMIN — SODIUM CHLORIDE: 9 INJECTION, SOLUTION INTRAVENOUS at 19:17

## 2019-09-06 RX ADMIN — SIMVASTATIN 20 MG: 20 TABLET, FILM COATED ORAL at 20:29

## 2019-09-06 RX ADMIN — VANCOMYCIN HYDROCHLORIDE 2000 MG: 1 INJECTION, POWDER, LYOPHILIZED, FOR SOLUTION INTRAVENOUS at 17:08

## 2019-09-06 RX ADMIN — Medication 975 MG: at 16:24

## 2019-09-06 RX ADMIN — ASPIRIN 81 MG 162 MG: 81 TABLET ORAL at 20:25

## 2019-09-06 RX ADMIN — HYDROMORPHONE HYDROCHLORIDE 0.5 MG: 1 INJECTION, SOLUTION INTRAMUSCULAR; INTRAVENOUS; SUBCUTANEOUS at 16:24

## 2019-09-06 RX ADMIN — ENOXAPARIN SODIUM 40 MG: 40 INJECTION SUBCUTANEOUS at 20:23

## 2019-09-06 ASSESSMENT — ENCOUNTER SYMPTOMS
FATIGUE: 1
CHEST TIGHTNESS: 0
NAUSEA: 0
COUGH: 0
DIARRHEA: 0
FEVER: 1
VOMITING: 0
WEAKNESS: 1
CHILLS: 1
APPETITE CHANGE: 0
SHORTNESS OF BREATH: 0
ABDOMINAL PAIN: 0
ACTIVITY CHANGE: 1
ROS SKIN COMMENTS: SEE HPI
PALPITATIONS: 0
LIGHT-HEADEDNESS: 1

## 2019-09-06 ASSESSMENT — ACTIVITIES OF DAILY LIVING (ADL)
WHICH_OF_THE_ABOVE_FUNCTIONAL_RISKS_HAD_A_RECENT_ONSET_OR_CHANGE?: AMBULATION
COGNITION: 0 - NO COGNITION ISSUES REPORTED
SWALLOWING: 0-->SWALLOWS FOODS/LIQUIDS WITHOUT DIFFICULTY
DRESS: 0-->INDEPENDENT
ADLS_ACUITY_SCORE: 13
FALL_HISTORY_WITHIN_LAST_SIX_MONTHS: NO
RETIRED_EATING: 0-->INDEPENDENT
BATHING: 1-->ASSISTIVE EQUIPMENT
RETIRED_COMMUNICATION: 0-->UNDERSTANDS/COMMUNICATES WITHOUT DIFFICULTY
TOILETING: 1-->ASSISTIVE EQUIPMENT

## 2019-09-06 NOTE — PROVIDER NOTIFICATION
SIGNIFICANT LAB VALUE    DATE: 9/6/2019    TIME OF RECEIPT FROM LAB: 1630    Lactic Acid: 2.3    Reported to: OLEKSANDR Neves    TIME LAB VALUE REPORTED TO PROVIDER: 0750

## 2019-09-06 NOTE — ED PROVIDER NOTES
History     Chief Complaint   Patient presents with     Cellulitis     The history is provided by the patient.     Duane E Arola is a 74 year old male who presented to the emergency department via wheelchair for evaluation of weakness, chills, fevers, and right leg swelling and redness.  Similar presentation approximately 1 year ago in June 2018.  Reports chronic mild erythema to the right leg with a rather abrupt onset this morning of the above symptoms including severe redness and heat to the extremity.  Past medical history is most significant for diabetes.  Required vancomycin and Rocephin on his previous admission.  Denies any abdominal pain, lower urinary tract symptoms, dyspnea, or chest pain.    Allergies:  Allergies   Allergen Reactions     Metformin      Legs get weak     Seafood Hives       Problem List:    Patient Active Problem List    Diagnosis Date Noted     Cellulitis of leg 06/30/2013     Priority: High     ACP (advance care planning) 03/15/2017     Priority: Medium     Advance Care Planning 3/15/2017: ACP Review of Chart / Resources Provided:  Reviewed chart for advance care plan.  Duane E Arola has no plan or code status on file. Discussed available resources and patient declined information.Juliet Cartagena               Fibula fracture 06/30/2013     Priority: Medium     Diabetes mellitus, type 2 (H) 06/30/2013     Priority: Medium     Cellulitis 06/30/2013     Priority: Medium        Past Medical History:    No past medical history on file.    Past Surgical History:    Past Surgical History:   Procedure Laterality Date     COLONOSCOPY N/A 1/26/2018    Procedure: COLONOSCOPY;  COLONOSCOPY;  Surgeon: Arnel Childers MD;  Location: [facility] OR     COLONOSCOPY - HIM SCAN  06/2012    [facility]     COLONOSCOPY - HIM SCAN  06/01/2006    Colonoscopy, Dr Marquez, [facility] 6/2006     PHACOEMULSIFICATION WITH STANDARD INTRAOCULAR LENS IMPLANT Right 3/22/2016    Procedure: PHACOEMULSIFICATION WITH STANDARD INTRAOCULAR  LENS IMPLANT;  Surgeon: Ramses Mcnulty MD;  Location: [facility] OR     PHACOEMULSIFICATION WITH STANDARD INTRAOCULAR LENS IMPLANT Left 11/2/2017    Procedure: PHACOEMULSIFICATION WITH STANDARD INTRAOCULAR LENS IMPLANT;  CATARACT EXTRACTION LEFT EYE WITH IMPLANT;  Surgeon: Severiano Martin MD;  Location: [facility] OR       Family History:    No family history on file.    Social History:  Marital Status:   [5]  Social History     Tobacco Use     Smoking status: Never Smoker     Smokeless tobacco: Never Used   Substance Use Topics     Alcohol use: No     Drug use: Not on file        Medications:      acetaminophen (TYLENOL) 325 MG tablet   ASPIRIN PO   Cranberry 1000 MG CAPS   garlic 150 MG TABS   hypromellose (ARTIFICIAL TEARS) 0.5 % SOLN ophthalmic solution   insulin degludec (TRESIBA FLEXTOUCH) 200 UNIT/ML pen   Multiple Vitamins-Minerals (CENTRUM SILVER) per tablet   Pioglitazone HCl-Glimepiride 30-2 MG TABS   SIMVASTATIN PO   insulin pen needle (BD CK U/F) 32G X 4 MM   insulin pen needle (TOPCARE CLICKFINE PEN NEEDLES) 31G X 6 MM         Review of Systems   Constitutional: Positive for activity change, chills, fatigue and fever. Negative for appetite change.   HENT: Negative.    Respiratory: Negative for cough, chest tightness and shortness of breath.    Cardiovascular: Negative for chest pain and palpitations.   Gastrointestinal: Negative for abdominal pain, diarrhea, nausea and vomiting.   Genitourinary: Negative.    Musculoskeletal:        See HPI   Skin:        See HPI   Neurological: Positive for weakness and light-headedness.       Physical Exam   BP: 113/69  Pulse: (!) 126  Heart Rate: 120  Temp: (!) 103.1  F (39.5  C)  Resp: 16  Weight: 117 kg (258 lb)  SpO2: 95 %      Physical Exam   Constitutional: He is oriented to person, place, and time. He appears well-developed and well-nourished.   Eyes: Conjunctivae and EOM are normal.   Cardiovascular: Regular rhythm.   Mild tachycardia   Pulmonary/Chest: Effort  normal and breath sounds normal.   Abdominal: Soft. He exhibits no distension. There is no tenderness. There is no guarding.   Musculoskeletal:   Examination shows rather significant cellulitis of the right lower extremity.   Neurological: He is alert and oriented to person, place, and time.   Skin: Skin is warm and dry. Capillary refill takes less than 2 seconds.   Psychiatric: He has a normal mood and affect.   Nursing note and vitals reviewed.      ED Course        Procedures               Critical Care time:  none               Results for orders placed or performed during the hospital encounter of 09/06/19 (from the past 24 hour(s))   Procalcitonin   Result Value Ref [facility]    Procalcitonin 0.22 ng/ml   Comprehensive metabolic panel   Result Value Ref [facility]    Sodium 140 133 - 144 mmol/L    Potassium 4.1 3.4 - 5.3 mmol/L    Chloride 104 94 - 109 mmol/L    Carbon Dioxide 29 20 - 32 mmol/L    Anion Gap 7 3 - 14 mmol/L    Glucose 126 (H) 70 - 99 mg/dL    Urea Nitrogen 22 7 - 30 mg/dL    Creatinine 0.98 0.66 - 1.25 mg/dL    GFR Estimate 75 >60 mL/min/[1.73_m2]    GFR Estimate If Black 87 >60 mL/min/[1.73_m2]    Calcium 8.9 8.5 - 10.1 mg/dL    Bilirubin Total 0.6 0.2 - 1.3 mg/dL    Albumin 4.0 3.4 - 5.0 g/dL    Protein Total 8.2 6.8 - 8.8 g/dL    Alkaline Phosphatase 60 40 - 150 U/L    ALT 38 0 - 70 U/L    AST 26 0 - 45 U/L   CBC with platelets differential   Result Value Ref [facility]    WBC 16.1 (H) 4.0 - 11.0 10e9/L    RBC Count 4.54 4.4 - 5.9 10e12/L    Hemoglobin 13.7 13.3 - 17.7 g/dL    Hematocrit 40.9 40.0 - 53.0 %    MCV 90 78 - 100 fl    MCH 30.2 26.5 - 33.0 pg    MCHC 33.5 31.5 - 36.5 g/dL    RDW 13.4 10.0 - 15.0 %    Platelet Count 236 150 - 450 10e9/L    Diff Method Automated Method     % Neutrophils 84.3 %    % Lymphocytes 5.4 %    % Monocytes 8.8 %    % Eosinophils 0.5 %    % Basophils 0.4 %    % Immature Granulocytes 0.6 %    Nucleated RBCs 0 0 /100    Absolute Neutrophil 13.6 (H) 1.6 - 8.3 10e9/L     Absolute Lymphocytes 0.9 0.8 - 5.3 10e9/L    Absolute Monocytes 1.4 (H) 0.0 - 1.3 10e9/L    Absolute Eosinophils 0.1 0.0 - 0.7 10e9/L    Absolute Basophils 0.1 0.0 - 0.2 10e9/L    Abs Immature Granulocytes 0.1 0 - 0.4 10e9/L    Absolute Nucleated RBC 0.0    Lactate for Sepsis Protocol   Result Value Ref Range    Lactate for Sepsis Protocol 2.3 (H) 0.7 - 2.0 mmol/L   UA reflex to Microscopic   Result Value Ref Range    Color Urine Yellow     Appearance Urine Clear     Glucose Urine Negative NEG^Negative mg/dL    Bilirubin Urine Negative NEG^Negative    Ketones Urine Negative NEG^Negative mg/dL    Specific Gravity Urine 1.022 1.003 - 1.035    Blood Urine Negative NEG^Negative    pH Urine 8.0 4.7 - 8.0 pH    Protein Albumin Urine 10 (A) NEG^Negative mg/dL    Urobilinogen mg/dL Normal 0.0 - 2.0 mg/dL    Nitrite Urine Negative NEG^Negative    Leukocyte Esterase Urine Negative NEG^Negative    Source Midstream Urine     RBC Urine <1 0 - 2 /HPF    WBC Urine 2 0 - 5 /HPF    Bacteria Urine None (A) NEG^Negative /HPF    Mucous Urine Present (A) NEG^Negative /LPF    Hyaline Casts 1 (A) OTO2^O - 2 /LPF       Medications   lidocaine 1 % 1 mL (has no administration in time range)   lidocaine (LMX4) kit (has no administration in time range)   sodium chloride (PF) 0.9% PF flush 3 mL (has no administration in time range)   sodium chloride (PF) 0.9% PF flush 3 mL (3 mLs Intravenous Not Given 9/6/19 1626)   vancomycin (VANCOCIN) 2,000 mg in sodium chloride 0.9 % 500 mL intermittent infusion (2,000 mg Intravenous Given 9/6/19 1708)   sodium chloride 0.9% infusion (has no administration in time range)   0.9% sodium chloride BOLUS (1,000 mLs Intravenous New Bag 9/6/19 1624)   acetaminophen (TYLENOL) tablet 975 mg (975 mg Oral Given 9/6/19 1624)   HYDROmorphone (PF) (DILAUDID) injection 0.5 mg (0.5 mg Intravenous Given 9/6/19 1624)       Assessments & Plan (with Medical Decision Making)   Certainly would fit any reasonable criteria for  admission.  Started on vancomycin after blood cultures.  Discussed and graciously accepted by Dr. Gracia.     This document was prepared using a combination of typing and voice generated software.  While every attempt was made for accuracy, spelling and grammatical errors may exist.    I have reviewed the nursing notes.    I have reviewed the findings, diagnosis, plan and need for follow up with the patient.       New Prescriptions    No medications on file       Final diagnoses:   Cellulitis of right lower extremity       9/6/2019   [facility]     Kristie Sousa PA-C  09/06/19 0535

## 2019-09-06 NOTE — ED NOTES
Pt here from clinic due to cellulitis of RLE. Pt went to clinic and presented with tachycardia and fever so was directed to come to ED. Pt states he bumped his shin a few weeks ago. Scab is noted to R shin surrounded by what appear to be pus filled blisters beneath the skin on shin. RLE is edematous, reddened, hot, shiny. CMS is intact to R foot. Borders of redness marked. Wife here, questions answered as appropriate.

## 2019-09-07 LAB
ANION GAP SERPL CALCULATED.3IONS-SCNC: 5 MMOL/L (ref 3–14)
BUN SERPL-MCNC: 21 MG/DL (ref 7–30)
CALCIUM SERPL-MCNC: 7.7 MG/DL (ref 8.5–10.1)
CHLORIDE SERPL-SCNC: 107 MMOL/L (ref 94–109)
CO2 SERPL-SCNC: 29 MMOL/L (ref 20–32)
CREAT SERPL-MCNC: 1.04 MG/DL (ref 0.66–1.25)
ERYTHROCYTE [DISTWIDTH] IN BLOOD BY AUTOMATED COUNT: 13.7 % (ref 10–15)
GFR SERPL CREATININE-BSD FRML MDRD: 69 ML/MIN/{1.73_M2}
GLUCOSE BLDC GLUCOMTR-MCNC: 118 MG/DL (ref 70–99)
GLUCOSE BLDC GLUCOMTR-MCNC: 119 MG/DL (ref 70–99)
GLUCOSE BLDC GLUCOMTR-MCNC: 123 MG/DL (ref 70–99)
GLUCOSE BLDC GLUCOMTR-MCNC: 124 MG/DL (ref 70–99)
GLUCOSE SERPL-MCNC: 89 MG/DL (ref 70–99)
HCT VFR BLD AUTO: 34.8 % (ref 40–53)
HGB BLD-MCNC: 11.3 G/DL (ref 13.3–17.7)
LACTATE BLD-SCNC: 1.1 MMOL/L (ref 0.7–2)
MCH RBC QN AUTO: 30.1 PG (ref 26.5–33)
MCHC RBC AUTO-ENTMCNC: 32.5 G/DL (ref 31.5–36.5)
MCV RBC AUTO: 93 FL (ref 78–100)
PLATELET # BLD AUTO: 177 10E9/L (ref 150–450)
POTASSIUM SERPL-SCNC: 3.9 MMOL/L (ref 3.4–5.3)
RBC # BLD AUTO: 3.76 10E12/L (ref 4.4–5.9)
SODIUM SERPL-SCNC: 141 MMOL/L (ref 133–144)
WBC # BLD AUTO: 17.5 10E9/L (ref 4–11)

## 2019-09-07 PROCEDURE — 36415 COLL VENOUS BLD VENIPUNCTURE: CPT | Performed by: INTERNAL MEDICINE

## 2019-09-07 PROCEDURE — 25000128 H RX IP 250 OP 636: Performed by: INTERNAL MEDICINE

## 2019-09-07 PROCEDURE — 00000146 ZZHCL STATISTIC GLUCOSE BY METER IP

## 2019-09-07 PROCEDURE — 12000000 ZZH R&B MED SURG/OB

## 2019-09-07 PROCEDURE — 85027 COMPLETE CBC AUTOMATED: CPT | Performed by: INTERNAL MEDICINE

## 2019-09-07 PROCEDURE — 25000132 ZZH RX MED GY IP 250 OP 250 PS 637: Performed by: INTERNAL MEDICINE

## 2019-09-07 PROCEDURE — 80048 BASIC METABOLIC PNL TOTAL CA: CPT | Performed by: INTERNAL MEDICINE

## 2019-09-07 PROCEDURE — 99232 SBSQ HOSP IP/OBS MODERATE 35: CPT | Performed by: INTERNAL MEDICINE

## 2019-09-07 PROCEDURE — 83605 ASSAY OF LACTIC ACID: CPT | Performed by: INTERNAL MEDICINE

## 2019-09-07 PROCEDURE — 25800030 ZZH RX IP 258 OP 636: Performed by: INTERNAL MEDICINE

## 2019-09-07 RX ADMIN — SIMVASTATIN 20 MG: 20 TABLET, FILM COATED ORAL at 21:36

## 2019-09-07 RX ADMIN — ACETAMINOPHEN 975 MG: 325 TABLET, FILM COATED ORAL at 16:57

## 2019-09-07 RX ADMIN — PIOGLITAZONE 30 MG: 30 TABLET ORAL at 07:57

## 2019-09-07 RX ADMIN — GLIMEPIRIDE 2 MG: 2 TABLET ORAL at 07:57

## 2019-09-07 RX ADMIN — SENNOSIDES AND DOCUSATE SODIUM 1 TABLET: 8.6; 5 TABLET ORAL at 07:57

## 2019-09-07 RX ADMIN — INSULIN DEGLUDEC INJECTION 120 UNITS: 100 INJECTION, SOLUTION SUBCUTANEOUS at 08:02

## 2019-09-07 RX ADMIN — SODIUM CHLORIDE: 9 INJECTION, SOLUTION INTRAVENOUS at 22:50

## 2019-09-07 RX ADMIN — SODIUM CHLORIDE: 9 INJECTION, SOLUTION INTRAVENOUS at 03:28

## 2019-09-07 RX ADMIN — ENOXAPARIN SODIUM 40 MG: 40 INJECTION SUBCUTANEOUS at 19:21

## 2019-09-07 RX ADMIN — ASPIRIN 81 MG 162 MG: 81 TABLET ORAL at 19:25

## 2019-09-07 RX ADMIN — IBUPROFEN 600 MG: 600 TABLET ORAL at 19:22

## 2019-09-07 RX ADMIN — VANCOMYCIN HYDROCHLORIDE 1750 MG: 1 INJECTION, POWDER, LYOPHILIZED, FOR SOLUTION INTRAVENOUS at 11:22

## 2019-09-07 ASSESSMENT — ACTIVITIES OF DAILY LIVING (ADL)
ADLS_ACUITY_SCORE: 15
ADLS_ACUITY_SCORE: 16
ADLS_ACUITY_SCORE: 15

## 2019-09-07 NOTE — H&P
[facility]    History and Physical  Hospitalist       Date of Admission:  9/6/2019    Assessment & Plan   Duane E Arola is a 74 year old male who presents with right lower extremity redness, pain swelling.  Diagnosed with cellulitis.  Clinically severe disease requires admission.  Last admitted for same was June 2018.    Active Problems:    Cellulitis of leg    Assessment: Present on admission, severe disease    Plan: Admit to inpatient, continue vancomycin   Pharmacy will dose      Diabetes mellitus, type 2 (H)    Assessment: Present on admission treated with insulin Tresiba and pioglitazone.    Plan: Insulin will be continued   Sliding scale   Diabetic diet  Lower extremity venous insufficiency   Assessment: Present on admission.  Plan: No intervention required but this could be risk factor for his recurrent cellulitis.    Dyslipidemia  Assessment: Treated on admission  Plan: Continue simvastatin.      DVT Prophylaxis: Enoxaparin (Lovenox) SQ  Code Status: Full Code    Disposition: Expected discharge in 2-3 days once cellulitis improved and safe discharge home plan created.    Nara Gracia    Primary Care Physician   Donavan Melgar    Chief Complaint RLE redness, swelling pain    Reason for Admission: severe RLE cellulitis.     Admission status: inpatient.     History is obtained from the patient, electronic health record and emergency department physician    History of Present Illness   Duane E Arola is a 74 year old man who is known to me from previous admission June 2018 when he was admitted for right lower extremity cellulitis, who comes to emergency room for evaluation of weakness chills fevers right leg swelling and redness.  The same location and extremity as it was June 2018.Reports chronic mild erythema to the right leg and both legs discoloration due to chronic venous insufficiency. Onset abrupt onset this morning of the above symptoms including severe redness and heat to the extremity.   Past medical history is most significant for diabetes which is relatively well controlled on insulin and Pioglitazone.  Required vancomycin and Rocephin on his previous admission.  Denies chest pain, sob, palpitation, orthopnea, PND, cough, any abdominal pain, lower urinary tract symptoms, dyspnea, or chest pain.    ED events: stable patient.     ED diagnostic workup: lactic acid 2.3 > 1.8. WBC 16.1 Hg 13.7 Na 140 K 4.1 Cr 0.98    ED imaging studies: none this admission.     ED treatment: Vancomycin started. Other treatments:   Medications   lidocaine 1 % 1 mL (has no administration in time range)   lidocaine (LMX4) kit (has no administration in time range)   sodium chloride (PF) 0.9% PF flush 3 mL (has no administration in time range)   sodium chloride (PF) 0.9% PF flush 3 mL (3 mLs Intravenous Not Given 9/6/19 1626)   vancomycin (VANCOCIN) 2,000 mg in sodium chloride 0.9 % 500 mL intermittent infusion (2,000 mg Intravenous Given 9/6/19 1708)   sodium chloride 0.9% infusion (has no administration in time range)   0.9% sodium chloride BOLUS (1,000 mLs Intravenous New Bag 9/6/19 1624)   acetaminophen (TYLENOL) tablet 975 mg (975 mg Oral Given 9/6/19 1624)   HYDROmorphone (PF) (DILAUDID) injection 0.5 mg (0.5 mg Intravenous Given 9/6/19 1624)       Past Medical History    I have reviewed this patient's medical history and updated it with pertinent information if needed.   No past medical history on file.    Past Surgical History   I have reviewed this patient's surgical history and updated it with pertinent information if needed.  Past Surgical History:   Procedure Laterality Date     COLONOSCOPY N/A 1/26/2018    Procedure: COLONOSCOPY;  COLONOSCOPY;  Surgeon: Arnel Childers MD;  Location: [facility]     COLONOSCOPY - HIM SCAN  06/2012    [facility]     COLONOSCOPY - HIM SCAN  06/01/2006    Colonoscopy, Dr Marquez, [facility] 6/2006     PHACOEMULSIFICATION WITH STANDARD INTRAOCULAR LENS IMPLANT Right 3/22/2016    Procedure:  PHACOEMULSIFICATION WITH STANDARD INTRAOCULAR LENS IMPLANT;  Surgeon: Ramses Mcnulty MD;  Location: [facility] OR     PHACOEMULSIFICATION WITH STANDARD INTRAOCULAR LENS IMPLANT Left 2017    Procedure: PHACOEMULSIFICATION WITH STANDARD INTRAOCULAR LENS IMPLANT;  CATARACT EXTRACTION LEFT EYE WITH IMPLANT;  Surgeon: eSveriano Martin MD;  Location: [facility] OR       Prior to Admission Medications   Prior to Admission Medications   Prescriptions Last Dose Informant Patient Reported? Taking?   ASPIRIN PO 2019 at Unknown time  Yes Yes   Sig: Take 162 mg by mouth daily    Cranberry 1000 MG CAPS 2019 at Unknown time  Yes Yes   Multiple Vitamins-Minerals (CENTRUM SILVER) per tablet 2019 at Unknown time  Yes Yes   Sig: Take 1 tablet by mouth daily.   Pioglitazone HCl-Glimepiride 30-2 MG TABS 2019 at Unknown time  Yes Yes   Sig: Take 1 tablet by mouth daily   SIMVASTATIN PO 2019 at Unknown time  Yes Yes   Sig: Take 20 mg by mouth daily   acetaminophen (TYLENOL) 325 MG tablet 2019 at Unknown time  No Yes   Sig: Take 2 tablets (650 mg) by mouth every 6 hours as needed for mild pain   garlic 150 MG TABS 2019 at Unknown time  Yes Yes   Sig: Take 150 mg by mouth daily.   hypromellose (ARTIFICIAL TEARS) 0.5 % SOLN ophthalmic solution 2019 at Unknown time  Yes Yes   Si drop every hour as needed for dry eyes   insulin degludec (TRESIBA FLEXTOUCH) 200 UNIT/ML pen 2019 at Unknown time  No Yes   Sig: Inject 110 Units Subcutaneous daily   Patient taking differently: Inject 120 Units Subcutaneous every morning    insulin pen needle (BD CK U/F) 32G X 4 MM   No No   Sig: Use 2 pen needles daily.   insulin pen needle (TOPCARE CLICKFINE PEN NEEDLES) 31G X 6 MM   No No   Sig: Use pen needles daily or as directed.      Facility-Administered Medications: None     Allergies   Allergies   Allergen Reactions     Metformin      Legs get weak     Seafood Hives       Social History   I have reviewed this patient's  social history and updated it with pertinent information if needed. Duane E Arola  reports that he has never smoked. He has never used smokeless tobacco. He reports that he does not drink alcohol.    Family History   I have reviewed this patient's family history and updated it with pertinent information if needed.   No family history on file.    Review of Systems   All 14 Systems were reviewed and found to be negative except what's mentioned in HPI.    Physical Exam   Temp: (!) 102  F (38.9  C) Temp src: Tympanic BP: (!) 104/49 Pulse: (!) 126 Heart Rate: 108 Resp: 20 SpO2: 95 % O2 Device: Nasal cannula Oxygen Delivery: 2 LPM  Vital Signs with Ranges  Temp:  [99.9  F (37.7  C)-103.1  F (39.5  C)] 102  F (38.9  C)  Pulse:  [126] 126  Heart Rate:  [108-120] 108  Resp:  [14-26] 20  BP: (104-143)/(49-77) 104/49  SpO2:  [91 %-97 %] 95 %  258 lbs 0 oz    Physical exam:   GENERAL: Awake, alert, in no distress. Obese.  HEENT: NC/AT. MMM. OP clear.   NECK: trachea midline, no JVD.   CARDIAC: regular, normal S1,S2, no S3. No murmurs, gallops or rubs. No bruits in the neck. No peripheral edema.    PULMONARY: normal vesicular breath sounds bilaterally.  GI: abdomen not distended and not tender on deep and superficial palpation, bowel sounds present. No hepatosplenomegaly or pulsatile masses.   : CVA not tender, bladder not palpable.  MUSCULOSKELETAL: major joints without effusion, full range of motion, no sarcopenia.  NEUROLOGICAL: normal muscle strength and sensory exam. DTR 2/4, no myoclonus. Gait not tested.   PSYCHIATRIC: normal affect, normal mood. Awake, alert and oriented x 4.  INTEGUMENT: both legs with chronic discoloration due to venous insufficiency. R LE (foot and anterior shin with severe erythema, very painful. No crepitus.No lymphangitis. Pulses present. Small pustules (see picture taken in ED).    LYMPHATIC/HEMATOLOGIC: no LAD in the neck, both axillae, and groins. No petechiae, no ecchymoses.       Goals of  care were discussed with the patient and family which included but not limited to anticipated treatment course during the current hospitalization, recovery from current event, discharge planning and transitions of care responsibilities and expected outcomes. Code status was addressed on admission as well. End of life care discussion not done.    Data   Data reviewed today:  I personally reviewed blood tests results.     Recent Labs   Lab 09/06/19  1618   WBC 16.1*   HGB 13.7   MCV 90         POTASSIUM 4.1   CHLORIDE 104   CO2 29   BUN 22   CR 0.98   ANIONGAP 7   BUSTER 8.9   *   ALBUMIN 4.0   PROTTOTAL 8.2   BILITOTAL 0.6   ALKPHOS 60   ALT 38   AST 26       No results found for this or any previous visit (from the past 24 hour(s)).

## 2019-09-07 NOTE — PLAN OF CARE
Assumed care of patient at approx 9:15pm. Patient up in recliner. Denid any pain. Has not voided since down in the ER. Denies any shortness of breath. Currently sleeping in recliner with no observable signs or symptoms of discomfort. Tele on and reading SR 90s per ICU nurse.     Face to face report given with opportunity to observe patient.    Report given to Baljinder Connell RN   9/6/2019  11:26 PM

## 2019-09-07 NOTE — PROGRESS NOTES
[facility]    Hospitalist Progress Note    Date of Service (when I saw the patient): 09/07/2019    Assessment & Plan   Duane E Arola is a 74 year old male who presents with right lower extremity redness, pain swelling.  Diagnosed with cellulitis.  Clinically severe disease requires admission.     Active Problems:    Cellulitis of leg    Assessment: Present on admission, severe disease.  Improving.    Plan:  continue vancomycin              Pharmacy will dose   Look to transition to PO tomorrow if still improving       Diabetes mellitus, type 2 (H)    Assessment: Present on admission treated with insulin Tresiba and pioglitazone.    Plan: Insulin will be continued              Sliding scale              Diabetic diet    Lower extremity venous insufficiency   Assessment: Present on admission.  Plan: No intervention required but this could be risk factor for his recurrent cellulitis.     Dyslipidemia  Assessment: Treated on admission  Plan: Continue simvastatin.     DVT Prophylaxis: Enoxaparin (Lovenox) SQ  Code Status: Full Code     Disposition: Expected discharge in 2-3 days once cellulitis improved.    Anjali Nair MD      Interval History   Patient seen at bedside.  Patient reports subjective improvement.  Leg is less swollen and red.  Patient is very talkative, jovial.  Denies any subjective fever or chills.  Pain is improved.    -Data reviewed today: I reviewed all new labs and imaging results over the last 24 hours.    Physical Exam   Temp: 98.4  F (36.9  C) Temp src: Tympanic BP: (!) 112/49 Pulse: (!) 126 Heart Rate: 78 Resp: 18 SpO2: 97 % O2 Device: None (Room air) Oxygen Delivery: 2 LPM  Vitals:    09/06/19 1557   Weight: 117 kg (258 lb)     Vital Signs with Ranges  Temp:  [98.4  F (36.9  C)-103.1  F (39.5  C)] 98.4  F (36.9  C)  Pulse:  [126] 126  Heart Rate:  [] 78  Resp:  [14-26] 18  BP: (104-143)/(48-77) 112/49  SpO2:  [91 %-97 %] 97 %    Intake/Output Summary (Last 24 hours) at  9/7/2019 0801  Last data filed at 9/7/2019 0700  Gross per 24 hour   Intake 1536 ml   Output 200 ml   Net 1336 ml       Peripheral IV 09/06/19 Right (Active)   Site Assessment WDL 9/7/2019  7:00 AM   Line Status Infusing 9/7/2019  7:00 AM   Phlebitis Scale 0-->no symptoms 9/7/2019  7:00 AM   Infiltration Scale 0 9/7/2019  7:00 AM   Number of days: 1       Wound 09/29/15 Distal;Anterior Finger (Comment which one) Laceration 3cm lac (Active)   Number of days: 1439       Incision/Surgical Site 03/22/16 Right Eye (Active)   Number of days: 1264       Incision/Surgical Site 11/02/17 Left Eye (Active)   Number of days: 674     No line/device    Constitutional - AA, NAD  HEENT - atraumatic, normocephalic  Neck - supple, no masses, no JVD  CVS - S1 S2 RRR, no murmurs, rubs, gallops  Respiratory - CTA b/l  GI - soft, NT, ND, + bowel sounds, no organomegaly  Musculoskeletal - right LE with improving erythema and swelling, receding from previously marked borders  Neuro - oriented x 3, no gross focal deficits      Medications     sodium chloride 125 mL/hr at 09/07/19 0328       aspirin  162 mg Oral Daily     enoxaparin  40 mg Subcutaneous Q24H     pioglitazone  30 mg Oral Daily    And     glimepiride  2 mg Oral Daily     insulin aspart  1-3 Units Subcutaneous TID AC     insulin aspart  1-3 Units Subcutaneous At Bedtime     insulin degludec  120 Units Subcutaneous QAM     senna-docusate  1 tablet Oral BID    Or     senna-docusate  2 tablet Oral BID     simvastatin  20 mg Oral At Bedtime     sodium chloride (PF)  3 mL Intravenous Q8H     sodium chloride (PF)  3 mL Intracatheter Q8H     vancomycin (VANCOCIN) IV  1,750 mg Intravenous Q18H       Data   Recent Labs   Lab 09/07/19  0530 09/06/19  1618   WBC 17.5* 16.1*   HGB 11.3* 13.7   MCV 93 90    236    140   POTASSIUM 3.9 4.1   CHLORIDE 107 104   CO2 29 29   BUN 21 22   CR 1.04 0.91  0.98   ANIONGAP 5 7   BUSTER 7.7* 8.9   GLC 89 126*   ALBUMIN  --  4.0   PROTTOTAL  --   8.2   BILITOTAL  --  0.6   ALKPHOS  --  60   ALT  --  38   AST  --  26     Lactic Acid   Date Value Ref Range Status   09/06/2019 1.8 0.7 - 2.0 mmol/L Final   06/09/2018 1.2 0.4 - 2.0 mmol/L Final       No results found for this or any previous visit (from the past 24 hour(s)).    Anjali Nair MD, MD

## 2019-09-07 NOTE — PLAN OF CARE
"[facility]patient Admission Note:    Patient admitted to 3114/3114-1 at approximately 1900 via cart accompanied by transport tech from emergency room . Report received from Brooke in SBAR format at [phone] telephone. Patient transferred to bed via self.. Patient is alert and oriented X 3, reports pain; rates at unable to rate on 0-10 scale.  Patient oriented to room, unit, hourly rounding, and plan of care. Explained admission packet and patient handbook with patient bill of rights brochure. Will continue to monitor and document as needed.     Inpatient Nursing criteria listed below was met:    Health care directives status obtained and documented: Yes    Care Everywhere authorization obtained No    MRSA swab completed for patient 65 years and older: Yes    Patient identifies a surrogate decision maker:   S / NO:[id number]::\"Yes\"} If yes, who:see index   Contact Information:see index    Core Measure diagnosis present:No.      If initial lactic acid >2.0, repeat lactic acid drawn within one hour of arrival to unit: NA. If no, state reason: was 2.3 after liter of fluids , lactic repeated and was :1.8    Vaccination assessment and education completed: Yes   Vaccinations received prior to admission: Pneumovax yes  Influenza(seasonal)  YES   Vaccination(s) ordered: not given today because up to date    Clergy visit ordered if patient requests: N/A    Skin issues/needs documented: Yes    Isolation Patient: no Education given, correct sign in place and documentation row added to PCS:  Yes    Fall Prevention Yes: Care plan updated, education given and documented, sticker and magnet in place: Yes    Care Plan initiated: Yes    Education Documented (including assessment): Yes    Patient has discharge needs : No If yes, please explain:states his son and girlfriend both live with him and they help him    "

## 2019-09-07 NOTE — PHARMACY
[facility]    Pharmacy      Antimicrobial Stewardship Note     Current antimicrobial therapy:  Anti-infectives (From now, onward)    Start     Dose/Rate Route Frequency Ordered Stop    19 1200  vancomycin (VANCOCIN) 1,750 mg in sodium chloride 0.9 % 500 mL intermittent infusion      1,750 mg  284 mL/hr over 2 Hours Intravenous EVERY 18 HOURS 19            Indication: Skin/soft tissue infection    Days of Therapy: 2     Pertinent labs:  Creatinine   Creatinine   Date Value Ref [facility] Status   2019 1.04 0.66 - 1.25 mg/dL Final   2019 0.98 0.66 - 1.25 mg/dL Final   2019 0.91 0.66 - 1.25 mg/dL Final     WBC   WBC   Date Value Ref Range Status   2019 17.5 (H) 4.0 - 11.0 10e9/L Final   2019 16.1 (H) 4.0 - 11.0 10e9/L Final   2018 13.0 (H) 4.0 - 11.0 10e9/L Final     Procalcitonin   Procalcitonin   Date Value Ref [facility] Status   2019 0.22 ng/ml Final     Comment:     0.05-0.24 ng/ml Low risk of systemic bacterial infection. Local bacterial   infection possible.  Recommendation: Assess other clinical features of   infection. Discourage antibiotics unless strong clinical suspicion for serious   infection.     2018 <0.05 ng/ml Final     Comment:     <0.05 ng/ml  Normal  Recommendation: Very low risk of bacterial infection.   Discourage antibiotics unless strong clinical suspicion for serious infection.     06/10/2018 <0.05 ng/ml Final     Comment:     <0.05 ng/ml  Normal  Recommendation: Very low risk of bacterial infection.   Discourage antibiotics unless strong clinical suspicion for serious infection.       CRP   CRP Inflammation   Date Value Ref [facility] Status   2018 36.6 (H) 0.0 - 8.0 mg/L Final   2013 9.1 (H) 0.0 - 3.0 mg/L Final     Comment:      reference ranges have not been established.  C-reactive protein   values   should be interpreted as a comparison of serial measurements.       Cultures Pending:   (19) MRSA nasal  swab pending  (9/6/19 Blood     Culture Results: none      Recommendations/Interventions:  1. Recommend switching to a cephalosporin for basic cellulitis/no history of MRSA    Alfonso Tovar, [facility]  September 7, 2019

## 2019-09-07 NOTE — PLAN OF CARE
VSS, afebrile, HRR, lungs clear, bowels active. RLE kenia, shinny with a few small blisters, hot to the touch, redness appears to be receeding from marked borders. 2+ edema in the RLE and 1+ in the LLE. Pt denies pain, currently sleeping in the recliner with leg elevated, pt states that he sleeps in the recliner at home. Pt is alert and oriented x 4, makes his needs known, and calls appropriately.     Face to face report given with opportunity to observe patient.    Report given to ARNOLDO Merida RN   9/7/2019  3:42 AM

## 2019-09-07 NOTE — PROGRESS NOTES
Assessment completed see flow sheet.    LOC: alert   Others present: Patient     Dx: Cellulitis  Chronic Disease Management: DM2, dyslipidemia    Lives with: Rodrigo  Living at:  [place]  Transportation: both drive    Primary PCP: Donavan Melgar, goes to the [facility] for dental care and Dr Martin for eye care  Insurance:  [employer] Medicare  Medicare Inpatient  letter reviewed with Duane.    Support System:  Marlee and son's  Homecare/PCA: no  /County Services:   no  [place]: YES      [facility] Referral line called: yes    Health Care Directive: NO, has information at home  Guardian: no  POA: no    Pharmacy: [employer] mail order or 's [facility]  Meds management: YES, manages own    Adequate Resources for needs (housing, utilities, food/med): YES  Household chores: shared  Work/community/social activity: YES, gets out frequently    ADLs: independent  Ambulation:uses walking stick as needed  Falls: no  Nutrition: no concern  Sleep: sleeps well    Equipment used: walking stick      Oxygen supplier: n/a      Does the supplier have valid oxygen orders: n/a    Mental health: no  Substance abuse: no  Exposure to violence/abuse: no  Stressors: denies    Able to Return to Prior Living Arrangements: YES    Choice of Vendor: n/a    Barriers: none    YELE[place]: low    Plan: home. Does not want any services.

## 2019-09-07 NOTE — PLAN OF CARE
"/53   Pulse (!) 126   Temp 98.9  F (37.2  C) (Tympanic)   Resp 18   Ht 1.753 m (5' 9\")   Wt 117 kg (258 lb)   SpO2 95%   BMI 38.10 kg/m      Pt alert and oriented upon initial assessment. Denies pain in RLE, it is blanchable red, blisters noted but no open areas. Redness is receeding from previously drawn borders.  RLE elevated t/o the day. Lungs clear. BS active. BG 89 and 123, no coverage needed. Appetite is good. Up in chair t/o day. Vanco infused. No falls or injuries this shift.     Face to face report given with opportunity to observe patient.    Report given to ARNOLDO Gale RN   9/7/2019  3:08 PM        "

## 2019-09-07 NOTE — PLAN OF CARE
"Pt admitted to 3114. Has multiple issues. Couldn't find teeth. Couldn't find cane. Wanted glucose checked. Wanted to call his gr lizette. Wanted soda that he had brought in. Opened soda and spilled all over himself and bed. Stated he sleeps in chair. Chair brought in for pt. Assisted to chair with 2 staff. After a few minutes in chair, decided that chair wasn't comfortable for pt and then req to go back to bed. Found cell phone. Hollered out \"Nurse, Nurse\". Found cell phone and had to call 2 family members to tell them. Glucose was 85. Food brought for pt but he has been too busy ruminating about things has not had time to eat. IV infusing after vanco completed. States he cant eat as he is nauseated.   "

## 2019-09-07 NOTE — PLAN OF CARE
No falls or injuries this shift. Uses call light approp. IVF @ 125/hr. Had IV Vanco. RLE red. Outlined with skin marker by ER staff. Pain only when touching leg. Up in recliner chair. States he sleeps in recliner at home. Has not voided yet since arriving on floor. States he voided in ER. Weaned from O2. (does not wear at home). Temp 102 upon arrival to floor. Med with Ibuprofen. Med with IV zofran for reports of nausea. Telemetry on. Has been on phone mult times to talk with SO. Pleasant and cooperative.     Face to face report given with opportunity to observe patient.    Report given to Samuel Hidalgo RN   9/6/2019  9:18 PM

## 2019-09-07 NOTE — PHARMACY-VANCOMYCIN DOSING SERVICE
Pharmacy Vancomycin Initial Note  Date of Service 2019  Patient's  1945  74 year old, male    Indication: Skin and Soft Tissue Infection    Current estimated CrCl = Estimated Creatinine Clearance: 80.9 mL/min (based on SCr of 0.98 mg/dL).    Creatinine for last 3 days  2019:  4:18 PM Creatinine 0.98 mg/dL    Recent Vancomycin Level(s) for last 3 days  No results found for requested labs within last 72 hours.      Vancomycin IV Administrations (past 72 hours)                   vancomycin (VANCOCIN) 2,000 mg in sodium chloride 0.9 % 500 mL intermittent infusion (mg) 2,000 mg Given 19 1708                Nephrotoxins and other renal medications (From now, onward)    Start     Dose/Rate Route Frequency Ordered Stop    19 1200  vancomycin (VANCOCIN) 1,750 mg in sodium chloride 0.9 % 500 mL intermittent infusion      1,750 mg  284 mL/hr over 2 Hours Intravenous EVERY 18 HOURS 19 [id number]  ibuprofen (ADVIL/MOTRIN) tablet 600 mg      600 mg Oral EVERY 6 HOURS PRN 19 1910            Contrast Orders - past 72 hours (72h ago, onward)    None                Plan:  1.  Start vancomycin  1750 mg IV q18h.   2.  Goal Trough Level: 10-15 mg/L   3.  Pharmacy will check trough levels as appropriate in 1-3 Days.    4. Serum creatinine levels will be ordered daily for the first week of therapy and at least twice weekly for subsequent weeks.    5. [place] method utilized to dose vancomycin therapy: [facility]    Navin Mae, Pharm D.

## 2019-09-07 NOTE — PHARMACY-VANCOMYCIN DOSING SERVICE
"Pharmacy Consult- Vancomycin Assessment, Day # 2    Duane E Arola is a 74 year old male admitted on 2019.    Vancomycin has been ordered per MD, for the indication of: Skin and Soft Tissue Infection    Current Antibiotic Regimen Includes: Vancomycin only    Patient Active Problem List   Diagnosis     Fibula fracture     Cellulitis of leg     Diabetes mellitus, type 2 (H)     Cellulitis     ACP (advance care planning)     Benign essential hypertension       Allergies (and reaction): Metformin and Seafood    Most recent flowsheet Weight: 117 kg (258 lb)  Most recent flowsheet Height: 175.3 cm (5' 9\")      Intake/Output Summary (Last 24 hours) at 2019 1421  Last data filed at 2019 1400  Gross per 24 hour   Intake 1776 ml   Output 975 ml   Net 801 ml       Tmax = Temp (24hrs), Av.2  F (37.9  C), Min:98.4  F (36.9  C), Max:103.1  F (39.5  C)      estimated creatinine clearance is 76.2 mL/min (based on SCr of 1.04 mg/dL).    Creatinine for last 3 days  2019:  4:18 PM Creatinine 0.98 mg/dL;  4:18 PM Creatinine 0.91 mg/dL  2019:  5:30 AM Creatinine 1.04 mg/dL     Nephrotoxins and other renal medications (From now, onward)    Start     Dose/Rate Route Frequency Ordered Stop    19 1200  vancomycin (VANCOCIN) 1,750 mg in sodium chloride 0.9 % 500 mL intermittent infusion      1,750 mg  284 mL/hr over 2 Hours Intravenous EVERY 18 HOURS 19 1929      19 1910  ibuprofen (ADVIL/MOTRIN) tablet 600 mg      600 mg Oral EVERY 6 HOURS PRN 19 1910            Contrast Orders - past 72 hours (72h ago, onward)    None          Vancomycin IV Administrations (past 72 hours)                   vancomycin (VANCOCIN) 1,750 mg in sodium chloride 0.9 % 500 mL intermittent infusion (mg) 1,750 mg Given 19 1122    vancomycin (VANCOCIN) 2,000 mg in sodium chloride 0.9 % 500 mL intermittent infusion (mg) 2,000 mg Given 19 1708                Cultures Pending:   (19) MRSA nasal swab " pending  (9/6/19 Blood    Culture Results: none      Assessment/Plan: Patient was on vancomycin in 6/2018. A 2500 mg loading dose was given followed by 1500 mg Q12H. A trough prior to the 4th dose at that time was 17.7 (Note weight and renal function similar at that time).    During current admission, vancomycin 2000 mg (17 mg/kg) was given followed by 1750 mg (15 mg/kg) IV Q18H has been initiated.  Will check trough prior to 3rd dose 9/8/19 at 0530.     Goal Trough Level: 10-15 mg/L       Thank You for the consult. Will continue to follow.    Alfonso Tovar, [facility] ....................  9/7/2019   2:21 PM

## 2019-09-08 LAB
ANION GAP SERPL CALCULATED.3IONS-SCNC: 5 MMOL/L (ref 3–14)
BACTERIA SPEC CULT: NORMAL
BUN SERPL-MCNC: 19 MG/DL (ref 7–30)
CALCIUM SERPL-MCNC: 7.8 MG/DL (ref 8.5–10.1)
CHLORIDE SERPL-SCNC: 108 MMOL/L (ref 94–109)
CO2 SERPL-SCNC: 27 MMOL/L (ref 20–32)
CREAT SERPL-MCNC: 1.12 MG/DL (ref 0.66–1.25)
ERYTHROCYTE [DISTWIDTH] IN BLOOD BY AUTOMATED COUNT: 13.9 % (ref 10–15)
GFR SERPL CREATININE-BSD FRML MDRD: 63 ML/MIN/{1.73_M2}
GLUCOSE BLDC GLUCOMTR-MCNC: 103 MG/DL (ref 70–99)
GLUCOSE BLDC GLUCOMTR-MCNC: 108 MG/DL (ref 70–99)
GLUCOSE BLDC GLUCOMTR-MCNC: 119 MG/DL (ref 70–99)
GLUCOSE BLDC GLUCOMTR-MCNC: 131 MG/DL (ref 70–99)
GLUCOSE BLDC GLUCOMTR-MCNC: 84 MG/DL (ref 70–99)
GLUCOSE SERPL-MCNC: 66 MG/DL (ref 70–99)
HCT VFR BLD AUTO: 32.8 % (ref 40–53)
HGB BLD-MCNC: 10.8 G/DL (ref 13.3–17.7)
LACTATE BLD-SCNC: 0.7 MMOL/L (ref 0.7–2)
MCH RBC QN AUTO: 30.5 PG (ref 26.5–33)
MCHC RBC AUTO-ENTMCNC: 32.9 G/DL (ref 31.5–36.5)
MCV RBC AUTO: 93 FL (ref 78–100)
PLATELET # BLD AUTO: 172 10E9/L (ref 150–450)
POTASSIUM SERPL-SCNC: 3.6 MMOL/L (ref 3.4–5.3)
RBC # BLD AUTO: 3.54 10E12/L (ref 4.4–5.9)
SODIUM SERPL-SCNC: 140 MMOL/L (ref 133–144)
SPECIMEN SOURCE: NORMAL
VANCOMYCIN SERPL-MCNC: 7.8 MG/L
WBC # BLD AUTO: 15.3 10E9/L (ref 4–11)

## 2019-09-08 PROCEDURE — 00000146 ZZHCL STATISTIC GLUCOSE BY METER IP

## 2019-09-08 PROCEDURE — 12000000 ZZH R&B MED SURG/OB

## 2019-09-08 PROCEDURE — 83605 ASSAY OF LACTIC ACID: CPT | Performed by: NURSE PRACTITIONER

## 2019-09-08 PROCEDURE — 36415 COLL VENOUS BLD VENIPUNCTURE: CPT | Performed by: NURSE PRACTITIONER

## 2019-09-08 PROCEDURE — 99232 SBSQ HOSP IP/OBS MODERATE 35: CPT | Performed by: NURSE PRACTITIONER

## 2019-09-08 PROCEDURE — 80048 BASIC METABOLIC PNL TOTAL CA: CPT | Performed by: NURSE PRACTITIONER

## 2019-09-08 PROCEDURE — 85027 COMPLETE CBC AUTOMATED: CPT | Performed by: NURSE PRACTITIONER

## 2019-09-08 PROCEDURE — 25000128 H RX IP 250 OP 636: Performed by: INTERNAL MEDICINE

## 2019-09-08 PROCEDURE — 25000132 ZZH RX MED GY IP 250 OP 250 PS 637: Performed by: NURSE PRACTITIONER

## 2019-09-08 PROCEDURE — 25800030 ZZH RX IP 258 OP 636: Performed by: INTERNAL MEDICINE

## 2019-09-08 PROCEDURE — 25000132 ZZH RX MED GY IP 250 OP 250 PS 637: Performed by: INTERNAL MEDICINE

## 2019-09-08 PROCEDURE — 80202 ASSAY OF VANCOMYCIN: CPT | Performed by: INTERNAL MEDICINE

## 2019-09-08 PROCEDURE — 36415 COLL VENOUS BLD VENIPUNCTURE: CPT | Performed by: INTERNAL MEDICINE

## 2019-09-08 RX ORDER — CEPHALEXIN 500 MG/1
500 CAPSULE ORAL EVERY 8 HOURS SCHEDULED
Status: DISCONTINUED | OUTPATIENT
Start: 2019-09-08 | End: 2019-09-10 | Stop reason: HOSPADM

## 2019-09-08 RX ADMIN — INSULIN DEGLUDEC INJECTION 120 UNITS: 100 INJECTION, SOLUTION SUBCUTANEOUS at 09:41

## 2019-09-08 RX ADMIN — SODIUM CHLORIDE: 9 INJECTION, SOLUTION INTRAVENOUS at 06:51

## 2019-09-08 RX ADMIN — ACETAMINOPHEN 975 MG: 325 TABLET, FILM COATED ORAL at 13:29

## 2019-09-08 RX ADMIN — PIOGLITAZONE 30 MG: 30 TABLET ORAL at 09:40

## 2019-09-08 RX ADMIN — CEPHALEXIN 500 MG: 500 CAPSULE ORAL at 13:29

## 2019-09-08 RX ADMIN — SIMVASTATIN 20 MG: 20 TABLET, FILM COATED ORAL at 21:08

## 2019-09-08 RX ADMIN — GLIMEPIRIDE 2 MG: 2 TABLET ORAL at 09:40

## 2019-09-08 RX ADMIN — ASPIRIN 81 MG 162 MG: 81 TABLET ORAL at 19:26

## 2019-09-08 RX ADMIN — ACETAMINOPHEN 975 MG: 325 TABLET, FILM COATED ORAL at 21:08

## 2019-09-08 RX ADMIN — CEPHALEXIN 500 MG: 500 CAPSULE ORAL at 21:08

## 2019-09-08 RX ADMIN — ENOXAPARIN SODIUM 40 MG: 40 INJECTION SUBCUTANEOUS at 21:08

## 2019-09-08 ASSESSMENT — ACTIVITIES OF DAILY LIVING (ADL)
ADLS_ACUITY_SCORE: 14
ADLS_ACUITY_SCORE: 16
ADLS_ACUITY_SCORE: 14
ADLS_ACUITY_SCORE: 16

## 2019-09-08 NOTE — PROGRESS NOTES
[facility]    Hospitalist Progress Note    Date of Service (when I saw the patient): 09/08/2019    Assessment & Plan       Cellulitis of leg: Improving. Though still had fever yesterday evening, WBC normal. Will switch from vanco to keflex.       Diabetes mellitus, type 2 (H): Did have a single low blood sugar, otherwise has been running on the high side. Suspect poor diet control at home as well, continue home medications plus sliding scale.       Benign essential hypertension: Stable       DVT Prophylaxis: Enoxaparin (Lovenox) SQ  Code Status: Full Code    Disposition: Expected discharge in 1-2 days once fver free.    Humera Manuel, CNP    Interval History   Feels much better. Very minimal pain to right lower leg, feels swelling has gone down as well. Denies chest pain, dyspnea, nausea. Appetitie improved today compared to recent days.     -Data reviewed today: I reviewed all new labs and imaging results over the last 24 hours.    Physical Exam   Temp: 98.6  F (37  C) Temp src: Tympanic BP: 134/55  Heart Rate: 86 Resp: 16 SpO2: 96 % O2 Device: None (Room air) Oxygen Delivery: 2 LPM  Vitals:    09/06/19 1557   Weight: 117 kg (258 lb)     Vital Signs with Ranges  Temp:  [97.6  F (36.4  C)-101.8  F (38.8  C)] 98.6  F (37  C)  Heart Rate:  [] 86  Resp:  [16-20] 16  BP: (107-134)/(49-55) 134/55  SpO2:  [90 %-96 %] 96 %  I/O last 3 completed shifts:  In: 4600 [P.O.:720; I.V.:3880]  Out: 1895 [Urine:1895]    Peripheral IV 09/06/19 Right (Active)   Site Assessment WDL 9/8/2019 11:00 AM   Line Status Infusing 9/8/2019 11:00 AM   Phlebitis Scale 0-->no symptoms 9/8/2019 11:00 AM   Infiltration Scale 0 9/8/2019 11:00 AM   Number of days: 2       Wound 09/29/15 Distal;Anterior Finger (Comment which one) Laceration 3cm lac (Active)   Number of days: 1440       Incision/Surgical Site 03/22/16 Right Eye (Active)   Number of days: 1265       Incision/Surgical Site 11/02/17 Left Eye (Active)   Number of  days: 675     Line/device assessment(s) completed for medical necessity    Constitutional: Awake,alert, no acute distress  Respiratory: Clear bilaterally, no wheezes, crackles, rhonchi.  Cardiovascular: HRR, no murmurs, rubs, thrills.   GI: Soft,nontender, bowel sounds positive.   Skin/Integumen: Bilateral venous stasis changes, right leg erythema shrinking back from borders, warm but not hot to touch.       Medications     sodium chloride 75 mL/hr at 09/08/19 0928       aspirin  162 mg Oral Daily     cephALEXin  500 mg Oral Q8H RIAN     enoxaparin  40 mg Subcutaneous Q24H     pioglitazone  30 mg Oral Daily    And     glimepiride  2 mg Oral Daily     insulin aspart  1-3 Units Subcutaneous TID AC     insulin aspart  1-3 Units Subcutaneous At Bedtime     insulin degludec  120 Units Subcutaneous QAM     senna-docusate  1 tablet Oral BID    Or     senna-docusate  2 tablet Oral BID     simvastatin  20 mg Oral At Bedtime     sodium chloride (PF)  3 mL Intravenous Q8H     sodium chloride (PF)  3 mL Intracatheter Q8H       Data   Recent Labs   Lab 09/08/19  0548 09/07/19  0530 09/06/19  1618   WBC 15.3* 17.5* 16.1*   HGB 10.8* 11.3* 13.7   MCV 93 93 90    177 236    141 140   POTASSIUM 3.6 3.9 4.1   CHLORIDE 108 107 104   CO2 27 29 29   BUN 19 21 22   CR 1.12 1.04 0.91  0.98   ANIONGAP 5 5 7   BUSTER 7.8* 7.7* 8.9   GLC 66* 89 126*   ALBUMIN  --   --  4.0   PROTTOTAL  --   --  8.2   BILITOTAL  --   --  0.6   ALKPHOS  --   --  60   ALT  --   --  38   AST  --   --  26       No results found for this or any previous visit (from the past 24 hour(s)).

## 2019-09-08 NOTE — PLAN OF CARE
VSS, febrile at start of the shift 101.8 pt given PO Tylenol, temp did come down to 100.6, pt then due for Aspirin, and was also given Ibuprofen at that time, pt temp then came down to normal 98.4. Pt RLE remains within boarders, kenia with blisters, and scab, edematous 2+. BG this afternoon has not required any sliding scale coverage. Pt is alert and oriented x 4, makes needs known, up with an assist of 1 and cane.

## 2019-09-08 NOTE — PLAN OF CARE
VSS, afebrile, this shift, HRR, telemetry reads SR 70's per ICU staff. Lungs clear, bowels active. Pt BG low this morning 66, pt given arcelia crackers and pudding per his request, and ordered breakfast right away. Pt asymptomatic. RLE 3+ edema at start of shift, down to 2+ this morning. RLE red kenia, warm to touch, blisters present, receeding from boarders. Pt denies pain. Pt is alert and oriented x 4, makes his needs known, and calls appropriately. Slept well during the night, up with a SBA and cane.     Face to face report given with opportunity to observe patient.    Report given to ARNOLDO Schaffer RN   9/8/2019  8:08 AM

## 2019-09-08 NOTE — PHARMACY-VANCOMYCIN DOSING SERVICE
"Pharmacy Consult- Vancomycin Assessment, Day # 3    Duane E Arola is a 74 year old male admitted on 2019.    Vancomycin has been ordered per MD, for the indication of: Skin and Soft Tissue Infection     Current Antibiotic Regimen Includes: Vancomycin only    Patient Active Problem List   Diagnosis     Fibula fracture     Cellulitis of leg     Diabetes mellitus, type 2 (H)     Cellulitis     ACP (advance care planning)     Benign essential hypertension       Allergies (and reaction): Metformin and Seafood    Most recent flowsheet Weight: 117 kg (258 lb)  Most recent flowsheet Height: 175.3 cm (5' 9\")      Intake/Output Summary (Last 24 hours) at 2019 0812  Last data filed at 2019 0651  Gross per 24 hour   Intake 4360 ml   Output 1695 ml   Net 2665 ml       Tmax = Temp (24hrs), Av.2  F (37.3  C), Min:97.6  F (36.4  C), Max:101.8  F (38.8  C)      estimated creatinine clearance is 70.8 mL/min (based on SCr of 1.12 mg/dL).    Creatinine for last 3 days  2019:  4:18 PM Creatinine 0.98 mg/dL;  4:18 PM Creatinine 0.91 mg/dL  2019:  5:30 AM Creatinine 1.04 mg/dL  2019:  5:48 AM Creatinine 1.12 mg/dL     Nephrotoxins and other renal medications (From now, onward)    Start     Dose/Rate Route Frequency Ordered Stop    19 1200  vancomycin (VANCOCIN) 1,750 mg in sodium chloride 0.9 % 500 mL intermittent infusion      1,750 mg  284 mL/hr over 2 Hours Intravenous EVERY 18 HOURS 19 1929      19 1910  ibuprofen (ADVIL/MOTRIN) tablet 600 mg      600 mg Oral EVERY 6 HOURS PRN 19 1910            Contrast Orders - past 72 hours (72h ago, onward)    None          Vancomycin IV Administrations (past 72 hours)                   vancomycin (VANCOCIN) 1,750 mg in sodium chloride 0.9 % 500 mL intermittent infusion (mg) 1,750 mg Given 19 1122    vancomycin (VANCOCIN) 2,000 mg in sodium chloride 0.9 % 500 mL intermittent infusion (mg) 2,000 mg Given 19 1708                Cultures " Pending:   (9/6/19) MRSA nasal swab pending  (9/6/19 Blood     Culture Results: none    Interpretation of levels and current regimen:  Trough level is  Therapeutic    Assessment/Plan: Trough is 7.8 prior to 3rd dose, which yields an estimated steady state level of 10.4. Will continue current regimen of 1750 mg (15 mg/kg) IV Q18H.    Note that creatinine has slowly been increasing. Will continue to monitor renal function closely.    Goal Trough Level: 10-15 mg/L       Thank You for the consult. Will continue to follow.    Alfonso Tovar [facility] ....................  9/8/2019   8:12 AM

## 2019-09-08 NOTE — PLAN OF CARE
"Reason for hospital stay:  right leg cellulitis  Most recent vitals: /66   Pulse (!) 126   Temp 100.7  F (38.2  C) (Tympanic)   Resp 16   Ht 1.753 m (5' 9\")   Wt 117 kg (258 lb)   SpO2 96%   BMI 38.10 kg/m    Pain Management:  denies pain.   Orientation:  alert and oriented  Cardiac:  regular, denies chest pain.   Respiratory:  maintaining 96% on room air  GI:  declines senna, stated has had bm this morning  :  voiding using urinal  Nutrition:  consistent carb diet  Skin Issues: redness extends behind knee but staying within border  IVF:  NS @ 75/hour  ABX: vanco dc'd this morning, changed to oral ceftin received first dose at 1345  Mobility:  stand by assist to bathroom, has been compliant keeping leg elevated.  Safety: calls for assistance, call light within reach.   Comments:  Reports being chilled this afternoon, vitals obtained noted T 100.7.  Medicated with tylenol, received po ABX will continue to monitor.     BG this morning 66- ordered breakfast promptly and ate all of meal, recheck at 10am 131.  Patient reports is able to tell when blood glucose is low.  Received scheduled amaryl and actos and tresiba.   Lunch time glucose 119- good appetite, will continue to monitor bg closely.     9/8/2019  1:41 PM  Karime Peters RN    Face to face report given with opportunity to observe patient.    Report given to Priscilla Peters RN   9/8/2019  3:54 PM        "

## 2019-09-09 LAB
ANION GAP SERPL CALCULATED.3IONS-SCNC: 9 MMOL/L (ref 3–14)
BUN SERPL-MCNC: 16 MG/DL (ref 7–30)
CALCIUM SERPL-MCNC: 7.9 MG/DL (ref 8.5–10.1)
CHLORIDE SERPL-SCNC: 109 MMOL/L (ref 94–109)
CO2 SERPL-SCNC: 25 MMOL/L (ref 20–32)
CREAT SERPL-MCNC: 0.96 MG/DL (ref 0.66–1.25)
ERYTHROCYTE [DISTWIDTH] IN BLOOD BY AUTOMATED COUNT: 13.8 % (ref 10–15)
GFR SERPL CREATININE-BSD FRML MDRD: 76 ML/MIN/{1.73_M2}
GLUCOSE BLDC GLUCOMTR-MCNC: 105 MG/DL (ref 70–99)
GLUCOSE BLDC GLUCOMTR-MCNC: 148 MG/DL (ref 70–99)
GLUCOSE BLDC GLUCOMTR-MCNC: 54 MG/DL (ref 70–99)
GLUCOSE BLDC GLUCOMTR-MCNC: 65 MG/DL (ref 70–99)
GLUCOSE BLDC GLUCOMTR-MCNC: 77 MG/DL (ref 70–99)
GLUCOSE BLDC GLUCOMTR-MCNC: 83 MG/DL (ref 70–99)
GLUCOSE BLDC GLUCOMTR-MCNC: 84 MG/DL (ref 70–99)
GLUCOSE BLDC GLUCOMTR-MCNC: 92 MG/DL (ref 70–99)
GLUCOSE BLDC GLUCOMTR-MCNC: 93 MG/DL (ref 70–99)
GLUCOSE BLDC GLUCOMTR-MCNC: 94 MG/DL (ref 70–99)
GLUCOSE BLDC GLUCOMTR-MCNC: 99 MG/DL (ref 70–99)
GLUCOSE SERPL-MCNC: 100 MG/DL (ref 70–99)
HCT VFR BLD AUTO: 34.8 % (ref 40–53)
HGB BLD-MCNC: 11.4 G/DL (ref 13.3–17.7)
LACTATE BLD-SCNC: 0.7 MMOL/L (ref 0.7–2)
MCH RBC QN AUTO: 30.2 PG (ref 26.5–33)
MCHC RBC AUTO-ENTMCNC: 32.8 G/DL (ref 31.5–36.5)
MCV RBC AUTO: 92 FL (ref 78–100)
PLATELET # BLD AUTO: 173 10E9/L (ref 150–450)
POTASSIUM SERPL-SCNC: 4 MMOL/L (ref 3.4–5.3)
RBC # BLD AUTO: 3.78 10E12/L (ref 4.4–5.9)
SODIUM SERPL-SCNC: 143 MMOL/L (ref 133–144)
WBC # BLD AUTO: 14.1 10E9/L (ref 4–11)

## 2019-09-09 PROCEDURE — 83605 ASSAY OF LACTIC ACID: CPT | Performed by: NURSE PRACTITIONER

## 2019-09-09 PROCEDURE — 25000128 H RX IP 250 OP 636: Performed by: INTERNAL MEDICINE

## 2019-09-09 PROCEDURE — 80048 BASIC METABOLIC PNL TOTAL CA: CPT | Performed by: NURSE PRACTITIONER

## 2019-09-09 PROCEDURE — 25800025 ZZH RX 258: Performed by: INTERNAL MEDICINE

## 2019-09-09 PROCEDURE — 25800030 ZZH RX IP 258 OP 636: Performed by: NURSE PRACTITIONER

## 2019-09-09 PROCEDURE — 25000132 ZZH RX MED GY IP 250 OP 250 PS 637: Performed by: INTERNAL MEDICINE

## 2019-09-09 PROCEDURE — 12000000 ZZH R&B MED SURG/OB

## 2019-09-09 PROCEDURE — 85027 COMPLETE CBC AUTOMATED: CPT | Performed by: NURSE PRACTITIONER

## 2019-09-09 PROCEDURE — 36415 COLL VENOUS BLD VENIPUNCTURE: CPT | Performed by: NURSE PRACTITIONER

## 2019-09-09 PROCEDURE — 00000146 ZZHCL STATISTIC GLUCOSE BY METER IP

## 2019-09-09 PROCEDURE — 99232 SBSQ HOSP IP/OBS MODERATE 35: CPT | Performed by: NURSE PRACTITIONER

## 2019-09-09 PROCEDURE — 25000132 ZZH RX MED GY IP 250 OP 250 PS 637: Performed by: NURSE PRACTITIONER

## 2019-09-09 RX ADMIN — DEXTROSE 50 % IN WATER (D50W) INTRAVENOUS SYRINGE 25 ML: at 17:07

## 2019-09-09 RX ADMIN — SODIUM CHLORIDE: 9 INJECTION, SOLUTION INTRAVENOUS at 05:49

## 2019-09-09 RX ADMIN — ASPIRIN 81 MG 162 MG: 81 TABLET ORAL at 19:04

## 2019-09-09 RX ADMIN — CEPHALEXIN 500 MG: 500 CAPSULE ORAL at 14:18

## 2019-09-09 RX ADMIN — ONDANSETRON 4 MG: 2 INJECTION INTRAMUSCULAR; INTRAVENOUS at 18:04

## 2019-09-09 RX ADMIN — SODIUM CHLORIDE: 9 INJECTION, SOLUTION INTRAVENOUS at 21:33

## 2019-09-09 RX ADMIN — GLIMEPIRIDE 2 MG: 2 TABLET ORAL at 09:09

## 2019-09-09 RX ADMIN — CEPHALEXIN 500 MG: 500 CAPSULE ORAL at 05:49

## 2019-09-09 RX ADMIN — ACETAMINOPHEN 975 MG: 325 TABLET, FILM COATED ORAL at 16:58

## 2019-09-09 RX ADMIN — IBUPROFEN 600 MG: 600 TABLET ORAL at 19:04

## 2019-09-09 RX ADMIN — INSULIN DEGLUDEC INJECTION 100 UNITS: 100 INJECTION, SOLUTION SUBCUTANEOUS at 08:59

## 2019-09-09 RX ADMIN — PIOGLITAZONE 30 MG: 30 TABLET ORAL at 09:09

## 2019-09-09 RX ADMIN — CEPHALEXIN 500 MG: 500 CAPSULE ORAL at 21:29

## 2019-09-09 RX ADMIN — DEXTROSE 50 % IN WATER (D50W) INTRAVENOUS SYRINGE 25 ML: at 17:51

## 2019-09-09 RX ADMIN — SIMVASTATIN 20 MG: 20 TABLET, FILM COATED ORAL at 21:29

## 2019-09-09 RX ADMIN — ENOXAPARIN SODIUM 40 MG: 40 INJECTION SUBCUTANEOUS at 21:31

## 2019-09-09 RX ADMIN — SENNOSIDES AND DOCUSATE SODIUM 1 TABLET: 8.6; 5 TABLET ORAL at 21:31

## 2019-09-09 ASSESSMENT — ACTIVITIES OF DAILY LIVING (ADL)
ADLS_ACUITY_SCORE: 14
ADLS_ACUITY_SCORE: 16
ADLS_ACUITY_SCORE: 14

## 2019-09-09 NOTE — PLAN OF CARE
Face to face report given with opportunity to observe patient.    Report given to Sarah Beth Palencia RN   9/9/2019  3:19 PM

## 2019-09-09 NOTE — PLAN OF CARE
VSS, afebrile, HRR, lungs clear, bowels active. Telemetry reads SR 90's per ICU staff. Pt denies pain. RLE kenia, warm, 3+ edema, more pitting today than yesterday for me, receeding from borders. Pt has been voiding lots this shift 1975 mL out this shift. BG has been low,  84 and 77. Pt given juice, crackers and pudding. Pt offered milk and sandwich but refused. Pt slept in chair on and off throughout the night, stated he was uncomfortable, but refused to try the bed. Helped reposition several times.     Face to face report given with opportunity to observe patient.    Report given to ARNOLDO Landaverde RN   9/9/2019  8:06 AM

## 2019-09-09 NOTE — PROGRESS NOTES
Checked in with Duane.  Denies questions or concerns at this time.  CTS will continue to remain available for support and resources.

## 2019-09-09 NOTE — PLAN OF CARE
The right leg cellulitis is receeding per patient and provider, the leg is reddened and bumpy but is not weeping, patient has elevated his legs as comfortable, and does make his needs known well, blood sugars have been on the lower side, but patient is not symptomatic, and patient was administered his diabetic medications this morning per providers order, patient has been able to adjust himself in the chair, and uses his cane, patient denies pain, and nausea.

## 2019-09-09 NOTE — PLAN OF CARE
Vitals: Febrile this shift. 101.5, treated with tylenol.     Pain: Denied    Orientation: A&O.    Cardiac: Tele on reading SR 80's per ICU.    Lungs: Clear    ABD: Bowels Active. Obese    Urinating: WNL    Skin: Bruising and scabs. RLE bright red. Boarders outlined and redness is receding.     IV fluids: NS at 75.    Antibiotics: PO keflex    Mobility: Up with standby assistance. Gait belt and cane used.    Safety: Call light in reach. Rounding done.    Comment: Watched tv most of shift. Bedtime BG was 80's. Gave rice crispy treat and milk.     Face to face report given with opportunity to observe patient.    Report given to Baljinder Caballero RN   9/8/2019  11:20 PM

## 2019-09-09 NOTE — PROGRESS NOTES
[facility]    Hospitalist Progress Note    Date of Service (when I saw the patient): 09/09/2019    Assessment & Plan       Cellulitis of leg: Improving. Though still had fever yesterday evening, WBC normal. Switched from vanco to keflex 9/8, redness continues to improve.       Diabetes mellitus, type 2 (H): Did have a single low blood sugar, otherwise has been running on the high side. Suspect poor diet control at home as well, continue home medications plus sliding scale. However, will decrease tresiba to 100 units but plan on return to full dose once home and eating per his normal pattern.      Benign essential hypertension: Stable       DVT Prophylaxis: Enoxaparin (Lovenox) SQ  Code Status: Full Code    Disposition: Expected discharge in 1-2 days once fever free.    Humera Manuel, CNP    Interval History   Feels much better. Very minimal pain to right lower leg, feels swelling has gone down as well. Denies chest pain, dyspnea, nausea. Appetitie improved today compared to recent days.     -Data reviewed today: I reviewed all new labs and imaging results over the last 24 hours.    Physical Exam   Temp: 99.5  F (37.5  C) Temp src: Tympanic BP: 151/69  Heart Rate: 90 Resp: 20 SpO2: 95 % O2 Device: None (Room air)    Vitals:    09/06/19 1557   Weight: 117 kg (258 lb)     Vital Signs with Ranges  Temp:  [98.5  F (36.9  C)-101.5  F (38.6  C)] 99.5  F (37.5  C)  Heart Rate:  [78-91] 90  Resp:  [16-20] 20  BP: (130-151)/(58-69) 151/69  SpO2:  [94 %-96 %] 95 %  I/O last 3 completed shifts:  In: 3055 [P.O.:1200; I.V.:1855]  Out: 3625 [Urine:3625]    Peripheral IV 09/06/19 Right (Active)   Site Assessment WDL 9/9/2019  8:00 AM   Line Status Infusing 9/9/2019  8:00 AM   Phlebitis Scale 0-->no symptoms 9/9/2019  8:00 AM   Infiltration Scale 0 9/9/2019  8:00 AM   Infiltration Site Treatment Method  None 9/9/2019  8:00 AM   Number of days: 3       Wound 09/29/15 Distal;Anterior Finger (Comment which one)  Laceration 3cm lac (Active)   Number of days: 1441       Incision/Surgical Site 03/22/16 Right Eye (Active)   Number of days: 1266       Incision/Surgical Site 11/02/17 Left Eye (Active)   Number of days: 676     Line/device assessment(s) completed for medical necessity    Constitutional: Awake,alert, no acute distress  Respiratory: Clear bilaterally, no wheezes, crackles, rhonchi.  Cardiovascular: HRR, no murmurs, rubs, thrills.   GI: Soft,nontender, bowel sounds positive.   Skin/Integumen: Bilateral venous stasis changes, right leg erythema shrinking back from borders, warm but not hot to touch.       Medications     sodium chloride 75 mL/hr at 09/09/19 0549       aspirin  162 mg Oral Daily     cephALEXin  500 mg Oral Q8H RIAN     enoxaparin  40 mg Subcutaneous Q24H     pioglitazone  30 mg Oral Daily    And     glimepiride  2 mg Oral Daily     insulin aspart  1-3 Units Subcutaneous TID AC     insulin aspart  1-3 Units Subcutaneous At Bedtime     insulin degludec  100 Units Subcutaneous QAM     senna-docusate  1 tablet Oral BID    Or     senna-docusate  2 tablet Oral BID     simvastatin  20 mg Oral At Bedtime     sodium chloride (PF)  3 mL Intravenous Q8H     sodium chloride (PF)  3 mL Intracatheter Q8H       Data   Recent Labs   Lab 09/09/19  0540 09/08/19  0548 09/07/19  0530 09/06/19  1618   WBC 14.1* 15.3* 17.5* 16.1*   HGB 11.4* 10.8* 11.3* 13.7   MCV 92 93 93 90    172 177 236    140 141 140   POTASSIUM 4.0 3.6 3.9 4.1   CHLORIDE 109 108 107 104   CO2 25 27 29 29   BUN 16 19 21 22   CR 0.96 1.12 1.04 0.91  0.98   ANIONGAP 9 5 5 7   BUSTER 7.9* 7.8* 7.7* 8.9   * 66* 89 126*   ALBUMIN  --   --   --  4.0   PROTTOTAL  --   --   --  8.2   BILITOTAL  --   --   --  0.6   ALKPHOS  --   --   --  60   ALT  --   --   --  38   AST  --   --   --  26       No results found for this or any previous visit (from the past 24 hour(s)).

## 2019-09-10 VITALS
BODY MASS INDEX: 38.21 KG/M2 | SYSTOLIC BLOOD PRESSURE: 136 MMHG | TEMPERATURE: 97.1 F | HEIGHT: 69 IN | DIASTOLIC BLOOD PRESSURE: 65 MMHG | HEART RATE: 126 BPM | WEIGHT: 258 LBS | RESPIRATION RATE: 18 BRPM | OXYGEN SATURATION: 92 %

## 2019-09-10 LAB
ERYTHROCYTE [DISTWIDTH] IN BLOOD BY AUTOMATED COUNT: 13.8 % (ref 10–15)
GLUCOSE BLDC GLUCOMTR-MCNC: 146 MG/DL (ref 70–99)
GLUCOSE BLDC GLUCOMTR-MCNC: 74 MG/DL (ref 70–99)
GLUCOSE SERPL-MCNC: 53 MG/DL (ref 70–99)
HCT VFR BLD AUTO: 33.6 % (ref 40–53)
HGB BLD-MCNC: 11.3 G/DL (ref 13.3–17.7)
MCH RBC QN AUTO: 30.8 PG (ref 26.5–33)
MCHC RBC AUTO-ENTMCNC: 33.6 G/DL (ref 31.5–36.5)
MCV RBC AUTO: 92 FL (ref 78–100)
PLATELET # BLD AUTO: 202 10E9/L (ref 150–450)
RBC # BLD AUTO: 3.67 10E12/L (ref 4.4–5.9)
WBC # BLD AUTO: 11.1 10E9/L (ref 4–11)

## 2019-09-10 PROCEDURE — 25000132 ZZH RX MED GY IP 250 OP 250 PS 637: Performed by: NURSE PRACTITIONER

## 2019-09-10 PROCEDURE — 82947 ASSAY GLUCOSE BLOOD QUANT: CPT | Performed by: NURSE PRACTITIONER

## 2019-09-10 PROCEDURE — 25000132 ZZH RX MED GY IP 250 OP 250 PS 637: Performed by: INTERNAL MEDICINE

## 2019-09-10 PROCEDURE — 99239 HOSP IP/OBS DSCHRG MGMT >30: CPT | Performed by: NURSE PRACTITIONER

## 2019-09-10 PROCEDURE — 85027 COMPLETE CBC AUTOMATED: CPT | Performed by: NURSE PRACTITIONER

## 2019-09-10 PROCEDURE — 36415 COLL VENOUS BLD VENIPUNCTURE: CPT | Performed by: NURSE PRACTITIONER

## 2019-09-10 PROCEDURE — 00000146 ZZHCL STATISTIC GLUCOSE BY METER IP

## 2019-09-10 RX ORDER — CEPHALEXIN 500 MG/1
500 CAPSULE ORAL EVERY 8 HOURS
Qty: 18 CAPSULE | Refills: 0 | Status: SHIPPED | OUTPATIENT
Start: 2019-09-10 | End: 2019-10-16

## 2019-09-10 RX ADMIN — PIOGLITAZONE 30 MG: 30 TABLET ORAL at 08:01

## 2019-09-10 RX ADMIN — SENNOSIDES AND DOCUSATE SODIUM 1 TABLET: 8.6; 5 TABLET ORAL at 08:01

## 2019-09-10 RX ADMIN — GLIMEPIRIDE 2 MG: 2 TABLET ORAL at 08:01

## 2019-09-10 RX ADMIN — CEPHALEXIN 500 MG: 500 CAPSULE ORAL at 05:52

## 2019-09-10 ASSESSMENT — ACTIVITIES OF DAILY LIVING (ADL)
ADLS_ACUITY_SCORE: 16

## 2019-09-10 NOTE — PHARMACY
[facility]    Pharmacy      Antimicrobial Stewardship Note     Current antimicrobial therapy:  Anti-infectives (From now, onward)    Start     Dose/Rate Route Frequency Ordered Stop    19 1400  cephALEXin (KEFLEX) capsule 500 mg      500 mg Oral EVERY 8 HOURS SCHEDULED 19 0849            Indication: SSTI    Days of Therapy: 3     Pertinent labs:  Creatinine   Creatinine   Date Value Ref Range Status   2019 0.96 0.66 - 1.25 mg/dL Final   2019 1.12 0.66 - 1.25 mg/dL Final   2019 1.04 0.66 - 1.25 mg/dL Final     WBC   WBC   Date Value Ref Range Status   09/10/2019 11.1 (H) 4.0 - 11.0 10e9/L Final   2019 14.1 (H) 4.0 - 11.0 10e9/L Final   2019 15.3 (H) 4.0 - 11.0 10e9/L Final     Procalcitonin   Procalcitonin   Date Value Ref Range Status   2019 0.22 ng/ml Final     Comment:     0.05-0.24 ng/ml Low risk of systemic bacterial infection. Local bacterial   infection possible.  Recommendation: Assess other clinical features of   infection. Discourage antibiotics unless strong clinical suspicion for serious   infection.     2018 <0.05 ng/ml Final     Comment:     <0.05 ng/ml  Normal  Recommendation: Very low risk of bacterial infection.   Discourage antibiotics unless strong clinical suspicion for serious infection.     06/10/2018 <0.05 ng/ml Final     Comment:     <0.05 ng/ml  Normal  Recommendation: Very low risk of bacterial infection.   Discourage antibiotics unless strong clinical suspicion for serious infection.       CRP   CRP Inflammation   Date Value Ref Range Status   2018 36.6 (H) 0.0 - 8.0 mg/L Final   2013 9.1 (H) 0.0 - 3.0 mg/L Final     Comment:      reference ranges have not been established.  C-reactive protein   values   should be interpreted as a comparison of serial measurements.       Culture Results:   Active MRSA Surveillance Culture [[id number]] Collected: 19 1918   Order Status: Completed Lab Status: Final result  Updated: 09/08/19 0611   Specimen: Nares from Nose     Specimen Description Nares    Culture Micro No MRSA isolated   Blood culture [[id number]] Collected: 09/06/19 1638   Order Status: Completed Lab Status: Preliminary result Updated: 09/10/19 0609   Specimen: Blood     Specimen Description Blood    Special Requests Left Hand    Culture Micro No growth after 4 days   Blood culture [[id number]] Collected: 09/06/19 1617   Order Status: Completed Lab Status: Preliminary result Updated: 09/10/19 0609   Specimen: Blood     Specimen Description Blood    Special Requests Right Arm    Culture Micro No growth after 4 days   Blood culture    Order Status: Canceled Lab Status: No result    Specimen: Blood           Recommendations/Interventions:  1. No recommendations at this time.    Navin Mae RPH  September 10, 2019

## 2019-09-10 NOTE — PLAN OF CARE
"Reason for hospital stay:  Cellulitis  Most recent vitals: /65   Pulse (!) 126   Temp 97.1  F (36.2  C)   Resp 18   Ht 1.753 m (5' 9\")   Wt 117 kg (258 lb)   SpO2 92%   BMI 38.10 kg/m    Pain Management:  Denies pain  Orientation:  A/O  Cardiac:  WDL  Respiratory:  Lung sounds diminished with fine crackles in bases sats 92% on room air  GI:  Bowel sounds audible et active x4  :  WDL  Nutrition:  Glucose 74 at 0130 et 53 with lab draw. Pt denies any symptoms alert et responsive  Skin Issues:   RLL red et warm to touch, no drainage, et is receding within marked boarders.   IVF:  NS 75/hr  ABX:  Keflex  Mobility:  Independently positioning in recliner  Safety:  A/O calls for assist appropriately   Comments:      9/10/2019  6:31 AM  Imani Bridges RN    "

## 2019-09-10 NOTE — DISCHARGE INSTRUCTIONS
Appointments:  1.  You have a Hospital Follow Up Appointment with Dr. Donavan Melgar on Wednesday, September 18 at 2:45.  If you have any questions, please call [phone]

## 2019-09-10 NOTE — PROGRESS NOTES
Name: Duane E Arola    MRN#: [id number]    Reason for Hospitalization: Cellulitis of right lower extremity [L03.115]    Discharge Date: 9/10/2019    Patient / Family response to discharge plan: in agreement    Follow-Up Appt: No future appointments.    Other Providers (Care Coordinator, County Services, PCA services etc): No    Discharge Disposition: home    Joann Guerrero CM

## 2019-09-10 NOTE — PLAN OF CARE
Patient has denied pain this shift, although he did c/o pain in BLE when he fell asleep in the chair with legs down after supper. He did deny pain at that time and declined offer for pain medication. VSS with the exception of continued temp spikes. Highest temp 101.3. Tylenol and Ibuprofen PO were both given because Tylenol was initially given but temp spiked again 2 hours later to 101.2, Ibuprofen was then given. Last temp check 99.5. Lung sounds were diminished with fine crackles in bilateral bases. Patient has been encouraged to deep breathe and cough frequently. Blood sugars also dropped this shift. First check 49, second check to verify was 52. At that time patient did have a fever and was feeling nauseous so 25 ml of D50 was given IV. Patient did come up to 92 and reported feeling better. Blood sugar recheck about 20 minutes was back down to 65. 25 ml of D50 was given again and blood sugar came up to 99. Patient was able to eat a cup of sherbet,  then Zofran IV was given. He was then able to eat 1 piece of cheese pizza and some grapes. Reordered supper and he ate all of it. Last blood sugar check was 148. Oral intake adequate. Urine output adequate. Patient felt like he was going to have a BM but just passed gas. Ambulating to the bathroom with stand by assist and walker. Patient sleeps in chair. He has had BLE elevated most of the shift. Patient denies numbness or tingling. RLE 3+ edema. Is quite red but within borders. No open areas. No alarms in place because patient has been calling to get up. Call light within reach. IVF continues. PO antibiotic continues.     Face to face report given with opportunity to observe patient.    Report given to Imani MCLAUGHLIN.    Sarah Beth Isaacs RN   9/10/2019  12:03 AM

## 2019-09-10 NOTE — DISCHARGE SUMMARY
[facility]    Discharge Summary  Hospitalist    Date of Admission:  9/6/2019  Date of Discharge:  9/10/2019 12:59 PM  Discharging Provider: Humera Manuel CNP  Date of Service (when I saw the patient): 9/10/19    Discharge Diagnoses   Active Problems:    Cellulitis of leg    Diabetes mellitus, type 2 (H)    Benign essential hypertension      History of Present Illness   From admission: Duane E Arola is a 74 year old man who is known to me from previous admission June 2018 when he was admitted for right lower extremity cellulitis, who comes to emergency room for evaluation of weakness chills fevers right leg swelling and redness.  The same location and extremity as it was June 2018.Reports chronic mild erythema to the right leg and both legs discoloration due to chronic venous insufficiency. Onset abrupt onset this morning of the above symptoms including severe redness and heat to the extremity.  Past medical history is most significant for diabetes which is relatively well controlled on insulin and Pioglitazone.  Required vancomycin and Rocephin on his previous admission.  Denies chest pain, sob, palpitation, orthopnea, PND, cough, any abdominal pain, lower urinary tract symptoms, dyspnea, or chest pain.    Hospital Course   Duane E Arola was admitted on 9/6/2019.  The following problems were addressed during his hospitalization:      Cellulitis of leg: Improving. Though still had fever yesterday evening, WBC normal. Switched from vanco to keflex 9/8, redness continues to improve. He feels ready for discharge. Will continue to monitor his leg for changes at home. Elevate as much as possible. Instruct to watch the eschar on the shin of the right leg.        Diabetes mellitus, type 2 (H): Did have a single low blood sugar, otherwise has been running on the high side. Suspect poor diet control at home as well, continue home medications plus sliding scale. He did require a reduction of his insulin while  he was here once infection under control due to hypoglycemia-primarily in the early morning hours. Advised once he returns home and is eating normally to slowly increase back to home dose.        Benign essential hypertension: Stable during his stay.        Humera Manuel CNP        Pending Results     Unresulted Labs Ordered in the Past 30 Days of this Admission     Date and Time Order Name Status Description    9/6/2019 1620 Blood culture Preliminary     9/6/2019 1606 Blood culture Preliminary           Code Status   Full Code       Primary Care Physician   Donavan Melgar      Discharge Disposition   Discharged to home  Condition at discharge: Stable    Consultations This Hospital Stay   PHARMACY TO DOSE VANCO  PHARMACY TO DOSE VANCO    Time Spent on this Encounter   I, Humera Manuel NP, personally saw the patient today and spent greater than 30 minutes discharging this patient.    Discharge Orders      Reason for your hospital stay    Cellulitis right leg     Follow-up and recommended labs and tests     Follow up with primary care provider, Donavan Melgar, within 7 days for hospital follow- up.  No follow up labs or test are needed.     Activity    Your activity upon discharge: activity as tolerated. elelvate right leg when at rest.     When to contact your care team    Call your primary doctor if you have any of the following: Fevers, increased swelling ,redness, pain.     Discharge Instructions    Your blood sugars having been occasionally running low while here. You received 50units or Tresiba this morning. Take this dose tomorrow, if sugars consistently elevated you may increase back to your regular dose the next day.     Full Code     Diet    Follow this diet upon discharge: Orders Placed This Encounter      Combination Diet Regular Diet Adult; [id number] Calories: High Consistent CHO (4-7 CHO units/meal)     Discharge Medications   Current Discharge Medication List      START taking these  medications    Details   cephALEXin (KEFLEX) 500 MG capsule Take 1 capsule (500 mg) by mouth every 8 hours for 6 days  Qty: 18 capsule, Refills: 0    Associated Diagnoses: Cellulitis of right lower extremity         CONTINUE these medications which have NOT CHANGED    Details   acetaminophen (TYLENOL) 325 MG tablet Take 2 tablets (650 mg) by mouth every 6 hours as needed for mild pain  Qty: 100 tablet, Refills: 0    Associated Diagnoses: Cellulitis of right lower extremity      ASPIRIN PO Take 162 mg by mouth daily       Cranberry 1000 MG CAPS       garlic 150 MG TABS Take 150 mg by mouth daily.      hypromellose (ARTIFICIAL TEARS) 0.5 % SOLN ophthalmic solution 1 drop every hour as needed for dry eyes      insulin degludec (TRESIBA FLEXTOUCH) 200 UNIT/ML pen Inject 110 Units Subcutaneous daily  Qty: 15 mL, Refills: 3    Associated Diagnoses: Type 2 diabetes mellitus with hyperglycemia, with long-term current use of insulin (H)      Multiple Vitamins-Minerals (CENTRUM SILVER) per tablet Take 1 tablet by mouth daily.      Pioglitazone HCl-Glimepiride 30-2 MG TABS Take 1 tablet by mouth daily      SIMVASTATIN PO Take 20 mg by mouth daily      !! insulin pen needle (BD CK U/F) 32G X 4 MM Use 2 pen needles daily.  Qty: 200 each, Refills: 6    Associated Diagnoses: Diabetes mellitus, type 2 (H)      !! insulin pen needle (TOPCARE CLICKFINE PEN NEEDLES) 31G X 6 MM Use pen needles daily or as directed.  Qty: 100 each, Refills: 12    Associated Diagnoses: Type 2 diabetes mellitus with hyperglycemia (H)       !! - Potential duplicate medications found. Please discuss with provider.        Allergies   Allergies   Allergen Reactions     Metformin      Legs get weak     Seafood Hives     Data   Most Recent 3 CBC's:  Recent Labs   Lab Test 09/10/19  0542 09/09/19  0540 09/08/19  0548   WBC 11.1* 14.1* 15.3*   HGB 11.3* 11.4* 10.8*   MCV 92 92 93    173 172      Most Recent 3 BMP's:  Recent Labs   Lab Test 09/10/19  0542  09/09/19  0540 09/08/19  0548 09/07/19  0530   NA  --  143 140 141   POTASSIUM  --  4.0 3.6 3.9   CHLORIDE  --  109 108 107   CO2  --  25 27 29   BUN  --  16 19 21   CR  --  0.96 1.12 1.04   ANIONGAP  --  9 5 5   BUSTER  --  7.9* 7.8* 7.7*   GLC 53* 100* 66* 89     Most Recent 2 LFT's:  Recent Labs   Lab Test 09/06/19  1618 06/09/18  1748   AST 26 28   ALT 38 38   ALKPHOS 60 80   BILITOTAL 0.6 0.4     Most Recent INR's and Anticoagulation Dosing History:  Anticoagulation Dose History     Recent Dosing and Labs Latest Ref Rng & Units 6/30/2013    INR 0.80 - 1.20 1.17        Most Recent 3 Troponin's:  Recent Labs   Lab Test 03/16/18  0914 03/16/18  0631   TROPI 0.033 0.034     Most Recent Cholesterol Panel:  Recent Labs   Lab Test 10/04/17   CHOL 108   LDL 49   HDL 38   TRIG 105     Most Recent 6 Bacteria Isolates From Any Culture (See EPIC Reports for Culture Details):  Recent Labs   Lab Test 09/06/19  1918 09/06/19  1638 09/06/19  1617 06/09/18  2010 06/09/18  1803 06/09/18  1748   CULT No MRSA isolated No growth after 6 days No growth after 6 days No MRSA isolated No growth after 6 days No growth after 6 days     Most Recent TSH, T4 and A1c Labs:  Recent Labs   Lab Test 06/09/18  1748  10/04/17   TSH  --   --  1.710   A1C 6.9*   < > 9.3*    < > = values in this interval not displayed.     Results for orders placed or performed during the hospital encounter of 06/09/18   US Lower Extremity Venous Duplex Right    Narrative    US LOWER EXTREMITY VENOUS DUPLEX RIGHT  6/9/2018 7:59 PM    History:Male, age 73 years SWELLING AND PAIN, ERYTHEMA, NO H/O DVT;     Comparison: None.    Technique: Grayscale and color Doppler ultrasound of the right  lower  extremity deep venous structures.    Findings:   The deep venous structures are patent and fully compressible. There is  normal augmentation of flow.      Impression    Impression:  1.  No evidence of DVT.    AIXA BURNS MD   XR Ankle Port Right G/E 3 Views    Narrative     PROCEDURE:  XR ANKLE PORT RT G/E 3 VW    HISTORY: pain R ankle and + RLE cellulitis;     COMPARISON:  None.    TECHNIQUE:  3 views of the right ankle were obtained.    FINDINGS:  There is deformity of the distal fibula consistent with old  healed fracture. No acute fractures or destructive lesions are noted.  There is minimal spurring at the insertion of the plantar fascia into  the calcaneus.       Impression    IMPRESSION: Mild calcaneal spur.      MYLES LANGE MD

## 2019-09-10 NOTE — PROGRESS NOTES
Patient discharged at 12:56 PM via wheel chair accompanied by son and staff. Prescriptions sent to patients preferred pharmacy. All belongings sent with patient.     Discharge instructions reviewed with pt et son. Listed belongings gathered and returned to patient. yes    Patient discharged to home.   Report called to n/a    Core Measures and Vaccines  Core Measures applicable during stay: No. If yes, state diagnosis: n/a  Pneumonia and Influenza given prior to discharge, if indicated: N/A    Surgical Patient   Surgical Procedures during stay: n/a  Did patient receive discharge instruction on wound care and recognition of infection symptoms? N/A    MISC  Follow up appointment made:  Yes  Home and hospital aquired medications returned to patient: Yes  Patient reports pain was well managed at discharge: Yes

## 2019-09-12 LAB
BACTERIA SPEC CULT: NORMAL
BACTERIA SPEC CULT: NORMAL
Lab: NORMAL
Lab: NORMAL
SPECIMEN SOURCE: NORMAL
SPECIMEN SOURCE: NORMAL

## 2019-09-14 ENCOUNTER — TELEPHONE (OUTPATIENT)
Dept: CASE MANAGEMENT | Facility: HOSPITAL | Age: 74
End: 2019-09-14

## 2019-09-21 ENCOUNTER — TELEPHONE (OUTPATIENT)
Dept: CASE MANAGEMENT | Facility: HOSPITAL | Age: 74
End: 2019-09-21

## 2019-10-02 ENCOUNTER — TRANSFERRED RECORDS (OUTPATIENT)
Dept: HEALTH INFORMATION MANAGEMENT | Facility: CLINIC | Age: 74
End: 2019-10-02

## 2019-10-04 DIAGNOSIS — L97.929: ICD-10-CM

## 2019-10-04 DIAGNOSIS — I87.2 ACUTE VENOUS STASIS DERMATITIS OF BOTH LEGS: Primary | ICD-10-CM

## 2019-10-09 ENCOUNTER — OFFICE VISIT (OUTPATIENT)
Dept: WOUND CARE | Facility: OTHER | Age: 74
End: 2019-10-09
Attending: NURSE PRACTITIONER
Payer: COMMERCIAL

## 2019-10-09 VITALS
RESPIRATION RATE: 18 BRPM | SYSTOLIC BLOOD PRESSURE: 125 MMHG | TEMPERATURE: 97.6 F | DIASTOLIC BLOOD PRESSURE: 78 MMHG | HEART RATE: 88 BPM

## 2019-10-09 DIAGNOSIS — I87.8 VENOUS STASIS: ICD-10-CM

## 2019-10-09 DIAGNOSIS — S91.001D OPEN WOUND OF RIGHT ANKLE, SUBSEQUENT ENCOUNTER: Primary | ICD-10-CM

## 2019-10-09 PROCEDURE — 99213 OFFICE O/P EST LOW 20 MIN: CPT | Performed by: NURSE PRACTITIONER

## 2019-10-09 PROCEDURE — G0463 HOSPITAL OUTPT CLINIC VISIT: HCPCS

## 2019-10-09 PROCEDURE — G0463 HOSPITAL OUTPT CLINIC VISIT: HCPCS | Mod: 25

## 2019-10-09 RX ORDER — SULFAMETHOXAZOLE/TRIMETHOPRIM 800-160 MG
TABLET ORAL
COMMUNITY
Start: 2019-10-02 | End: 2019-10-16

## 2019-10-09 ASSESSMENT — PAIN SCALES - GENERAL: PAINLEVEL: NO PAIN (0)

## 2019-10-09 NOTE — PATIENT INSTRUCTIONS
Wash hands prior to dressing change.   Clean instruments as appropriate    If old dressing sticks--just wet it with wound cleanser or water/soap   Wash wound with mild soap and water or wound cleanser, dry (okay to shower, don't soak wound)  Apply silver hydrofiber to wound base  Cover with tegaderm heel foam  Change every 3-4 days or as needed    Please call if any questions/concerns and/or problems ([phone])    Follow up   One week    Three building blocks of wound healing  1. Protein (if not contraindicated)  2. Daily vitamin with vitamin C and zinc

## 2019-10-09 NOTE — PROGRESS NOTES
"SUBJECTIVE:  Duane E Arola, 74 year old, male presents with the following Chief Complaint(s) with HPI to follow:  Chief Complaint   Patient presents with     WOUND CARE        Wound Care    HPI:  Duane is here today for the reassessment and treatment of right, medial ankle wound.  Back story (hard to get a good background):  Sounds like this wound has been off an on for awhile.   Started >1 year ago.    Recently hospitalized from 9/6/19 to 9/10/19 for RLE cellulitis. He was seen with similar presentation about 1 year ago back in June 2018.    Sent home on Keflex  10/2/19: follow up appointment with Dr. Melgar.  Wound culture at that time: positive for serratia marcescens.  Keflex was changed to Bactrim BID.  Referral to wound care at this time.   10/9/19: 1st wound care with myself.   Current tx: antibiotic ointment + gauze  Denies any fevers, chills, and/or malodorous drainage.   PMH: venous stasis disease, Type 2 diabetes, denies hx of smoking  Last A1c  Lab Results   Component Value Date    A1C 6.9 06/09/2018    A1C 7.7 01/16/2018    A1C 9.3 10/04/2017    A1C 8.3 03/15/2017    A1C 8.0 12/08/2016     Reports his BGs are \"good\"     Patient Active Problem List   Diagnosis     Fibula fracture     Cellulitis of leg     Diabetes mellitus, type 2 (H)     Cellulitis     ACP (advance care planning)     Benign essential hypertension       History reviewed. No pertinent past medical history.    Past Surgical History:   Procedure Laterality Date     COLONOSCOPY N/A 1/26/2018    Procedure: COLONOSCOPY;  COLONOSCOPY;  Surgeon: Arnel Childers MD;  Location: [facility] OR     COLONOSCOPY - HIM SCAN  06/2012    [facility]     COLONOSCOPY - HIM SCAN  06/01/2006    Colonoscopy, Dr Marquez, [facility] 6/2006     PHACOEMULSIFICATION WITH STANDARD INTRAOCULAR LENS IMPLANT Right 3/22/2016    Procedure: PHACOEMULSIFICATION WITH STANDARD INTRAOCULAR LENS IMPLANT;  Surgeon: Ramses Mcnulty MD;  Location: [facility] OR     PHACOEMULSIFICATION WITH STANDARD INTRAOCULAR " LENS IMPLANT Left 11/2/2017    Procedure: PHACOEMULSIFICATION WITH STANDARD INTRAOCULAR LENS IMPLANT;  CATARACT EXTRACTION LEFT EYE WITH IMPLANT;  Surgeon: Severiano Martin MD;  Location: [facility] OR       History reviewed. No pertinent family history.    Social History     Tobacco Use     Smoking status: Never Smoker     Smokeless tobacco: Never Used   Substance Use Topics     Alcohol use: No       Current Outpatient Medications   Medication Sig Dispense Refill     acetaminophen (TYLENOL) 325 MG tablet Take 2 tablets (650 mg) by mouth every 6 hours as needed for mild pain 100 tablet 0     ASPIRIN PO Take 162 mg by mouth daily        Cranberry 1000 MG CAPS        garlic 150 MG TABS Take 150 mg by mouth daily.       insulin degludec (TRESIBA FLEXTOUCH) 200 UNIT/ML pen Inject 110 Units Subcutaneous daily (Patient taking differently: Inject 80 Units Subcutaneous every morning ) 15 mL 3     insulin pen needle (BD CK U/F) 32G X 4 MM Use 2 pen needles daily. 200 each 6     insulin pen needle (TOPCARE CLICKFINE PEN NEEDLES) 31G X 6 MM Use pen needles daily or as directed. 100 each 12     Multiple Vitamins-Minerals (CENTRUM SILVER) per tablet Take 1 tablet by mouth daily.       order for DME Equipment being ordered:     1.  Tegaderm heel foam  Apply to affected area every 3 days  Disp: 5 ech    Wound size: 0.3 x 04 cm; 1 x 0.9 x 0.3 cm  Refill: 3 5 each 3     Pioglitazone HCl-Glimepiride 30-2 MG TABS Take 1 tablet by mouth daily       SIMVASTATIN PO Take 20 mg by mouth daily       hypromellose (ARTIFICIAL TEARS) 0.5 % SOLN ophthalmic solution 1 drop every hour as needed for dry eyes         Allergies   Allergen Reactions     Metformin      Legs get weak     Seafood Hives       REVIEW OF SYSTEMS  Skin: as noted above  Eyes: positive for history of cataracts (right eye)  Ears/Nose/Throat: negative  Respiratory: Cough- gone, No shortness of breath, No dyspnea on exertion and No hemoptysis  Cardiovascular:  negative  Gastrointestinal: negative  Genitourinary: negative  Musculoskeletal: history of arthritis; history of right shoulder surgery  Neurologic: negative   Psychiatric: negative  Hematologic/Lymphatic/Immunologic: negative  Endocrine: positive for diabetes       OBJECTIVE:  /78 (BP Location: Left arm, Patient Position: Sitting, Cuff Size: Adult Regular)   Pulse 88   Temp 97.6  F (36.4  C) (Tympanic)   Resp 18   Constitutional: alert and no distress  Musculoskeletal: extremities normal- no gross deformities noted  Skin:   Wound description: Type of Wound- venous stasis; Location- medial ankle, Drainage amount-scant, Drainage color-serous, Odor- none; Wound bed-see picture, Surrounding skin-resolving.  Measurements:   Proximal: 1 x 0.9 x 0.3 cm  Distal: 0.3 x 0.4 cm  Dressing change: Wound cleansed with soap and water, dried, dressed with silver hydrofiber + heel foam.     Psychiatric: mentation appears normal and affect normal/bright, at time tangential      LABS  Results for orders placed or performed during the hospital encounter of 09/06/19   Comprehensive metabolic panel   Result Value Ref [facility]    Sodium 140 133 - 144 mmol/L    Potassium 4.1 3.4 - 5.3 mmol/L    Chloride 104 94 - 109 mmol/L    Carbon Dioxide 29 20 - 32 mmol/L    Anion Gap 7 3 - 14 mmol/L    Glucose 126 (H) 70 - 99 mg/dL    Urea Nitrogen 22 7 - 30 mg/dL    Creatinine 0.98 0.66 - 1.25 mg/dL    GFR Estimate 75 >60 mL/min/[1.73_m2]    GFR Estimate If Black 87 >60 mL/min/[1.73_m2]    Calcium 8.9 8.5 - 10.1 mg/dL    Bilirubin Total 0.6 0.2 - 1.3 mg/dL    Albumin 4.0 3.4 - 5.0 g/dL    Protein Total 8.2 6.8 - 8.8 g/dL    Alkaline Phosphatase 60 40 - 150 U/L    ALT 38 0 - 70 U/L    AST 26 0 - 45 U/L   CBC with platelets differential   Result Value Ref [facility]    WBC 16.1 (H) 4.0 - 11.0 10e9/L    RBC Count 4.54 4.4 - 5.9 10e12/L    Hemoglobin 13.7 13.3 - 17.7 g/dL    Hematocrit 40.9 40.0 - 53.0 %    MCV 90 78 - 100 fl    MCH 30.2 26.5 - 33.0 pg     MCHC 33.5 31.5 - 36.5 g/dL    RDW 13.4 10.0 - 15.0 %    Platelet Count 236 150 - 450 10e9/L    Diff Method Automated Method     % Neutrophils 84.3 %    % Lymphocytes 5.4 %    % Monocytes 8.8 %    % Eosinophils 0.5 %    % Basophils 0.4 %    % Immature Granulocytes 0.6 %    Nucleated RBCs 0 0 /100    Absolute Neutrophil 13.6 (H) 1.6 - 8.3 10e9/L    Absolute Lymphocytes 0.9 0.8 - 5.3 10e9/L    Absolute Monocytes 1.4 (H) 0.0 - 1.3 10e9/L    Absolute Eosinophils 0.1 0.0 - 0.7 10e9/L    Absolute Basophils 0.1 0.0 - 0.2 10e9/L    Abs Immature Granulocytes 0.1 0 - 0.4 10e9/L    Absolute Nucleated RBC 0.0    Lactate for Sepsis Protocol   Result Value Ref [facility]    Lactate for Sepsis Protocol 2.3 (H) 0.7 - 2.0 mmol/L   UA reflex to Microscopic   Result Value Ref [facility]    Color Urine Yellow     Appearance Urine Clear     Glucose Urine Negative NEG^Negative mg/dL    Bilirubin Urine Negative NEG^Negative    Ketones Urine Negative NEG^Negative mg/dL    Specific Gravity Urine 1.022 1.003 - 1.035    Blood Urine Negative NEG^Negative    pH Urine 8.0 4.7 - 8.0 pH    Protein Albumin Urine 10 (A) NEG^Negative mg/dL    Urobilinogen mg/dL Normal 0.0 - 2.0 mg/dL    Nitrite Urine Negative NEG^Negative    Leukocyte Esterase Urine Negative NEG^Negative    Source Midstream Urine     RBC Urine <1 0 - 2 /HPF    WBC Urine 2 0 - 5 /HPF    Bacteria Urine None (A) NEG^Negative /HPF    Mucous Urine Present (A) NEG^Negative /LPF    Hyaline Casts 1 (A) OTO2^O - 2 /LPF   Procalcitonin   Result Value Ref [facility]    Procalcitonin 0.22 ng/ml   Lactic acid whole blood   Result Value Ref [facility]    Lactic Acid 1.8 0.7 - 2.0 mmol/L   Creatinine   Result Value Ref [facility]    Creatinine 0.91 0.66 - 1.25 mg/dL    GFR Estimate 82 >60 mL/min/[1.73_m2]    GFR Estimate If Black >90 >60 mL/min/[1.73_m2]   Glucose by meter   Result Value Ref [facility]    Glucose 85 70 - 99 mg/dL   Glucose by meter   Result Value Ref [facility]    Glucose 178 (H) 70 - 99 mg/dL   Basic metabolic  panel   Result Value Ref [facility]    Sodium 141 133 - 144 mmol/L    Potassium 3.9 3.4 - 5.3 mmol/L    Chloride 107 94 - 109 mmol/L    Carbon Dioxide 29 20 - 32 mmol/L    Anion Gap 5 3 - 14 mmol/L    Glucose 89 70 - 99 mg/dL    Urea Nitrogen 21 7 - 30 mg/dL    Creatinine 1.04 0.66 - 1.25 mg/dL    GFR Estimate 69 >60 mL/min/[1.73_m2]    GFR Estimate If Black 80 >60 mL/min/[1.73_m2]    Calcium 7.7 (L) 8.5 - 10.1 mg/dL   CBC with platelets   Result Value Ref [facility]    WBC 17.5 (H) 4.0 - 11.0 10e9/L    RBC Count 3.76 (L) 4.4 - 5.9 10e12/L    Hemoglobin 11.3 (L) 13.3 - 17.7 g/dL    Hematocrit 34.8 (L) 40.0 - 53.0 %    MCV 93 78 - 100 fl    MCH 30.1 26.5 - 33.0 pg    MCHC 32.5 31.5 - 36.5 g/dL    RDW 13.7 10.0 - 15.0 %    Platelet Count 177 150 - 450 10e9/L   Glucose by meter   Result Value Ref [facility]    Glucose 124 (H) 70 - 99 mg/dL   Glucose by meter   Result Value Ref [facility]    Glucose 123 (H) 70 - 99 mg/dL   Lactic acid level STAT for sepsis protocol   Result Value Ref [facility]    Lactate for Sepsis Protocol 1.1 0.7 - 2.0 mmol/L   Glucose by meter   Result Value Ref [facility]    Glucose 118 (H) 70 - 99 mg/dL   Glucose by meter   Result Value Ref [facility]    Glucose 119 (H) 70 - 99 mg/dL   Vancomycin level   Result Value Ref [facility]    Vancomycin Level 7.8 mg/L   Glucose by meter   Result Value Ref [facility]    Glucose 103 (H) 70 - 99 mg/dL   CBC with platelets   Result Value Ref [facility]    WBC 15.3 (H) 4.0 - 11.0 10e9/L    RBC Count 3.54 (L) 4.4 - 5.9 10e12/L    Hemoglobin 10.8 (L) 13.3 - 17.7 g/dL    Hematocrit 32.8 (L) 40.0 - 53.0 %    MCV 93 78 - 100 fl    MCH 30.5 26.5 - 33.0 pg    MCHC 32.9 31.5 - 36.5 g/dL    RDW 13.9 10.0 - 15.0 %    Platelet Count 172 150 - 450 10e9/L   Basic metabolic panel   Result Value Ref [facility]    Sodium 140 133 - 144 mmol/L    Potassium 3.6 3.4 - 5.3 mmol/L    Chloride 108 94 - 109 mmol/L    Carbon Dioxide 27 20 - 32 mmol/L    Anion Gap 5 3 - 14 mmol/L    Glucose 66 (L) 70 - 99 mg/dL    Urea Nitrogen 19 7 - 30  mg/dL    Creatinine 1.12 0.66 - 1.25 mg/dL    GFR Estimate 63 >60 mL/min/[1.73_m2]    GFR Estimate If Black 73 >60 mL/min/[1.73_m2]    Calcium 7.8 (L) 8.5 - 10.1 mg/dL   Glucose by meter   Result Value Ref [facility]    Glucose 131 (H) 70 - 99 mg/dL   Glucose by meter   Result Value Ref [facility]    Glucose 119 (H) 70 - 99 mg/dL   Glucose by meter   Result Value Ref [facility]    Glucose 108 (H) 70 - 99 mg/dL   Lactic acid level STAT for sepsis protocol   Result Value Ref [facility]    Lactate for Sepsis Protocol 0.7 0.7 - 2.0 mmol/L   Glucose by meter   Result Value Ref [facility]    Glucose 84 70 - 99 mg/dL   CBC with platelets   Result Value Ref [facility]    WBC 14.1 (H) 4.0 - 11.0 10e9/L    RBC Count 3.78 (L) 4.4 - 5.9 10e12/L    Hemoglobin 11.4 (L) 13.3 - 17.7 g/dL    Hematocrit 34.8 (L) 40.0 - 53.0 %    MCV 92 78 - 100 fl    MCH 30.2 26.5 - 33.0 pg    MCHC 32.8 31.5 - 36.5 g/dL    RDW 13.8 10.0 - 15.0 %    Platelet Count 173 150 - 450 10e9/L   Basic metabolic panel   Result Value Ref [facility]    Sodium 143 133 - 144 mmol/L    Potassium 4.0 3.4 - 5.3 mmol/L    Chloride 109 94 - 109 mmol/L    Carbon Dioxide 25 20 - 32 mmol/L    Anion Gap 9 3 - 14 mmol/L    Glucose 100 (H) 70 - 99 mg/dL    Urea Nitrogen 16 7 - 30 mg/dL    Creatinine 0.96 0.66 - 1.25 mg/dL    GFR Estimate 76 >60 mL/min/[1.73_m2]    GFR Estimate If Black 88 >60 mL/min/[1.73_m2]    Calcium 7.9 (L) 8.5 - 10.1 mg/dL   Glucose by meter   Result Value Ref [facility]    Glucose 84 70 - 99 mg/dL   Glucose by meter   Result Value Ref [facility]    Glucose 77 70 - 99 mg/dL   Glucose by meter   Result Value Ref [facility]    Glucose 83 70 - 99 mg/dL   Glucose by meter   Result Value Ref [facility]    Glucose 94 70 - 99 mg/dL   Glucose by meter   Result Value Ref [facility]    Glucose 54 (L) 70 - 99 mg/dL   Glucose by meter   Result Value Ref [facility]    Glucose 92 70 - 99 mg/dL   Glucose by meter   Result Value Ref [facility]    Glucose 65 (L) 70 - 99 mg/dL   Glucose by meter   Result Value Ref [facility]    Glucose 99 70 -  99 mg/dL   Glucose by meter   Result Value Ref [facility]    Glucose 93 70 - 99 mg/dL   Glucose by meter   Result Value Ref [facility]    Glucose 105 (H) 70 - 99 mg/dL   Lactic acid level STAT for sepsis protocol   Result Value Ref [facility]    Lactate for Sepsis Protocol 0.7 0.7 - 2.0 mmol/L   Glucose by meter   Result Value Ref [facility]    Glucose 148 (H) 70 - 99 mg/dL   CBC with platelets   Result Value Ref [facility]    WBC 11.1 (H) 4.0 - 11.0 10e9/L    RBC Count 3.67 (L) 4.4 - 5.9 10e12/L    Hemoglobin 11.3 (L) 13.3 - 17.7 g/dL    Hematocrit 33.6 (L) 40.0 - 53.0 %    MCV 92 78 - 100 fl    MCH 30.8 26.5 - 33.0 pg    MCHC 33.6 31.5 - 36.5 g/dL    RDW 13.8 10.0 - 15.0 %    Platelet Count 202 150 - 450 10e9/L     *Note: Due to a large number of results and/or encounters for the requested time period, some results have not been displayed. A complete set of results can be found in Results Review.       ASSESSMENT / PLAN:  (S91.001D) Open wound of right ankle, subsequent encounter  (primary encounter diagnosis)  Comment: noted  Plan: order for DME          Plan developed: silverhydrofiber as the wound appears to be wet    (I87.8) Venous stasis  Comment: note  Plan: order for DME          Treatment goal:  Moisture balance  Prevent infection  Promote healing    Patient Instructions   Wash hands prior to dressing change.   Clean instruments as appropriate    If old dressing sticks--just wet it with wound cleanser or water/soap   Wash wound with mild soap and water or wound cleanser, dry (okay to shower, don't soak wound)  Apply silver hydrofiber to wound base  Cover with tegaderm heel foam  Change every 3-4 days or as needed    Please call if any questions/concerns and/or problems ([phone])    Follow up   One week    Three building blocks of wound healing  1. Protein (if not contraindicated)  2. Daily vitamin with vitamin C and zinc        Time: 40 mintues  Barrier: none  Willingness to learn: accepting    Dahiana KAYE  FNP-BC  Disease Management    Cc: Dr. Melgar

## 2019-10-09 NOTE — NURSING NOTE
"Chief Complaint   Patient presents with     WOUND CARE       Initial /78 (BP Location: Left arm, Patient Position: Sitting, Cuff Size: Adult Regular)   Pulse 88   Temp 97.6  F (36.4  C) (Tympanic)   Resp 18  Estimated body mass index is 38.1 kg/m  as calculated from the following:    Height as of 9/6/19: 1.753 m (5' 9\").    Weight as of 9/6/19: 117 kg (258 lb).  Medication Reconciliation: complete  Patricia Munson LPN  "

## 2019-10-16 ENCOUNTER — OFFICE VISIT (OUTPATIENT)
Dept: WOUND CARE | Facility: OTHER | Age: 74
End: 2019-10-16
Attending: NURSE PRACTITIONER
Payer: COMMERCIAL

## 2019-10-16 VITALS
BODY MASS INDEX: 37.47 KG/M2 | SYSTOLIC BLOOD PRESSURE: 140 MMHG | WEIGHT: 253 LBS | HEIGHT: 69 IN | DIASTOLIC BLOOD PRESSURE: 70 MMHG | OXYGEN SATURATION: 94 % | HEART RATE: 93 BPM | TEMPERATURE: 97.5 F

## 2019-10-16 DIAGNOSIS — S91.001D OPEN WOUND OF RIGHT ANKLE, SUBSEQUENT ENCOUNTER: Primary | ICD-10-CM

## 2019-10-16 PROCEDURE — 97597 DBRDMT OPN WND 1ST 20 CM/<: CPT | Performed by: NURSE PRACTITIONER

## 2019-10-16 PROCEDURE — G0463 HOSPITAL OUTPT CLINIC VISIT: HCPCS | Mod: 25

## 2019-10-16 PROCEDURE — G0463 HOSPITAL OUTPT CLINIC VISIT: HCPCS

## 2019-10-16 PROCEDURE — 99213 OFFICE O/P EST LOW 20 MIN: CPT | Mod: 25 | Performed by: NURSE PRACTITIONER

## 2019-10-16 ASSESSMENT — PAIN SCALES - GENERAL: PAINLEVEL: NO PAIN (0)

## 2019-10-16 ASSESSMENT — MIFFLIN-ST. JEOR: SCORE: 1867.98

## 2019-10-16 NOTE — PROGRESS NOTES
"SUBJECTIVE:  Duane E Arola, 74 year old, male presents with the following Chief Complaint(s) with HPI to follow:  Chief Complaint   Patient presents with     WOUND CARE        Wound Care    HPI:  Duane is here today for the reassessment and treatment of right, medial ankle wound.  Back story (hard to get a good):  Sounds like this wound has been off an on for awhile.   Started >1 year ago.    Recently hospitalized from 9/6/19 to 9/10/19 for RLE cellulitis. He was seen with similar presentation about 1 year ago back in June 2018.    Sent home on Keflex  10/2/19: follow up appointment with Dr. Melgar.  Wound culture at that time: positive for serratia marcescens.  Keflex was changed to Bactrim BID.  Referral to wound care at this time.   10/9/19: 1st wound care with myself.   Home tx: antibiotic ointment + gauze  Current tx: silver hydrofiber + foam  No issues with dressing changes   Reports his pain is better  Denies any fevers, chills, and/or malodorous drainage.   PMH: venous stasis disease, Type 2 diabetes, denies hx of smoking  Last A1c  Lab Results   Component Value Date    A1C 6.9 06/09/2018    A1C 7.7 01/16/2018    A1C 9.3 10/04/2017    A1C 8.3 03/15/2017    A1C 8.0 12/08/2016     Reports his BGs continue to be \"good\"     Patient Active Problem List   Diagnosis     Fibula fracture     Cellulitis of leg     Diabetes mellitus, type 2 (H)     Cellulitis     ACP (advance care planning)     Benign essential hypertension       History reviewed. No pertinent past medical history.    Past Surgical History:   Procedure Laterality Date     COLONOSCOPY N/A 1/26/2018    Procedure: COLONOSCOPY;  COLONOSCOPY;  Surgeon: Arnel Childers MD;  Location: [facility]     COLONOSCOPY - HIM SCAN  06/2012    [facility]     COLONOSCOPY - HIM SCAN  06/01/2006    Colonoscopy, Dr Marquez, [facility] 6/2006     PHACOEMULSIFICATION WITH STANDARD INTRAOCULAR LENS IMPLANT Right 3/22/2016    Procedure: PHACOEMULSIFICATION WITH STANDARD INTRAOCULAR LENS IMPLANT;  " Surgeon: Ramses Mcnulty MD;  Location: [facility]     PHACOEMULSIFICATION WITH STANDARD INTRAOCULAR LENS IMPLANT Left 11/2/2017    Procedure: PHACOEMULSIFICATION WITH STANDARD INTRAOCULAR LENS IMPLANT;  CATARACT EXTRACTION LEFT EYE WITH IMPLANT;  Surgeon: Severiano Martin MD;  Location: [facility]       History reviewed. No pertinent family history.    Social History     Tobacco Use     Smoking status: Never Smoker     Smokeless tobacco: Never Used   Substance Use Topics     Alcohol use: No       Current Outpatient Medications   Medication Sig Dispense Refill     acetaminophen (TYLENOL) 325 MG tablet Take 2 tablets (650 mg) by mouth every 6 hours as needed for mild pain 100 tablet 0     ASPIRIN PO Take 162 mg by mouth daily        Cranberry 1000 MG CAPS        garlic 150 MG TABS Take 150 mg by mouth daily.       hypromellose (ARTIFICIAL TEARS) 0.5 % SOLN ophthalmic solution 1 drop every hour as needed for dry eyes       insulin degludec (TRESIBA FLEXTOUCH) 200 UNIT/ML pen Inject 110 Units Subcutaneous daily (Patient taking differently: Inject 80 Units Subcutaneous every morning ) 15 mL 3     insulin pen needle ([place] CK U/F) 32G X 4 MM Use 2 pen needles daily. 200 each 6     insulin pen needle (TOPCARE CLICKFINE PEN NEEDLES) 31G X 6 MM Use pen needles daily or as directed. 100 each 12     Multiple Vitamins-Minerals (CENTRUM SILVER) per tablet Take 1 tablet by mouth daily.       order for DME Equipment being ordered:     1.  Tegaderm heel foam  Apply to affected area every 3 days  Disp: 5 ech    Wound size: 0.3 x 04 cm; 1 x 0.9 x 0.3 cm  Refill: 3 5 each 3     Pioglitazone HCl-Glimepiride 30-2 MG TABS Take 1 tablet by mouth daily       SIMVASTATIN PO Take 20 mg by mouth daily         Allergies   Allergen Reactions     Metformin      Legs get weak     Seafood Hives       REVIEW OF SYSTEMS  Skin: as noted above  Eyes: positive for history of cataracts (right eye)  Ears/Nose/Throat: negative  Respiratory: Cough- gone, No  "shortness of breath, No dyspnea on exertion and No hemoptysis  Cardiovascular: negative  Gastrointestinal: negative  Genitourinary: negative  Musculoskeletal: history of arthritis; history of right shoulder surgery  Neurologic: negative   Psychiatric: negative  Hematologic/Lymphatic/Immunologic: negative  Endocrine: positive for diabetes       OBJECTIVE:  BP (!) 140/70 (BP Location: Left arm, Patient Position: Chair, Cuff Size: Adult Regular)   Pulse 93   Temp 97.5  F (36.4  C) (Tympanic)   Ht 1.753 m (5' 9\")   Wt 114.8 kg (253 lb)   SpO2 94%   BMI 37.36 kg/m    Constitutional: alert and no distress  Musculoskeletal: extremities normal- no gross deformities noted  Skin:   Wound description: Type of Wound- venous stasis; Location- medial ankle, Drainage amount-scant, Drainage color-serous, Odor- none; Wound bed-slough covered, 70% red after debridement, Surrounding skin- no heat, no erythema, no lymphangitis.  Measurements:   Proximal: 0.9 x 0.7 x 0.3 cm  Distal: 0.3 x 0.1 cm  Dressing change: Wound cleansed with soap and water, dried, dressed with iodosorb + foam.     Psychiatric: mentation appears normal and affect normal/bright    Washed wound with soap and water.  Dried.    Noted above   Conservative sharps debridement:  2 patient identifiers used and painted with betadine prior to procedure.  Explained risks and benefits of procedure.  Patient consents to continue.    I removed <20 sqcm of nonviable tissue from on top of wound using a sharp, sterile pick ups and iris scissory.    No bleeding, no pain.  Rinsed with normal saline.    Wound care includes as mentioned above        LABS  Results for orders placed or performed during the hospital encounter of 09/06/19   Comprehensive metabolic panel   Result Value Ref Range    Sodium 140 133 - 144 mmol/L    Potassium 4.1 3.4 - 5.3 mmol/L    Chloride 104 94 - 109 mmol/L    Carbon Dioxide 29 20 - 32 mmol/L    Anion Gap 7 3 - 14 mmol/L    Glucose 126 (H) 70 - 99 " mg/dL    Urea Nitrogen 22 7 - 30 mg/dL    Creatinine 0.98 0.66 - 1.25 mg/dL    GFR Estimate 75 >60 mL/min/[1.73_m2]    GFR Estimate If Black 87 >60 mL/min/[1.73_m2]    Calcium 8.9 8.5 - 10.1 mg/dL    Bilirubin Total 0.6 0.2 - 1.3 mg/dL    Albumin 4.0 3.4 - 5.0 g/dL    Protein Total 8.2 6.8 - 8.8 g/dL    Alkaline Phosphatase 60 40 - 150 U/L    ALT 38 0 - 70 U/L    AST 26 0 - 45 U/L   CBC with platelets differential   Result Value Ref Range    WBC 16.1 (H) 4.0 - 11.0 10e9/L    RBC Count 4.54 4.4 - 5.9 10e12/L    Hemoglobin 13.7 13.3 - 17.7 g/dL    Hematocrit 40.9 40.0 - 53.0 %    MCV 90 78 - 100 fl    MCH 30.2 26.5 - 33.0 pg    MCHC 33.5 31.5 - 36.5 g/dL    RDW 13.4 10.0 - 15.0 %    Platelet Count 236 150 - 450 10e9/L    Diff Method Automated Method     % Neutrophils 84.3 %    % Lymphocytes 5.4 %    % Monocytes 8.8 %    % Eosinophils 0.5 %    % Basophils 0.4 %    % Immature Granulocytes 0.6 %    Nucleated RBCs 0 0 /100    Absolute Neutrophil 13.6 (H) 1.6 - 8.3 10e9/L    Absolute Lymphocytes 0.9 0.8 - 5.3 10e9/L    Absolute Monocytes 1.4 (H) 0.0 - 1.3 10e9/L    Absolute Eosinophils 0.1 0.0 - 0.7 10e9/L    Absolute Basophils 0.1 0.0 - 0.2 10e9/L    Abs Immature Granulocytes 0.1 0 - 0.4 10e9/L    Absolute Nucleated RBC 0.0    Lactate for Sepsis Protocol   Result Value Ref Range    Lactate for Sepsis Protocol 2.3 (H) 0.7 - 2.0 mmol/L   UA reflex to Microscopic   Result Value Ref Range    Color Urine Yellow     Appearance Urine Clear     Glucose Urine Negative NEG^Negative mg/dL    Bilirubin Urine Negative NEG^Negative    Ketones Urine Negative NEG^Negative mg/dL    Specific Gravity Urine 1.022 1.003 - 1.035    Blood Urine Negative NEG^Negative    pH Urine 8.0 4.7 - 8.0 pH    Protein Albumin Urine 10 (A) NEG^Negative mg/dL    Urobilinogen mg/dL Normal 0.0 - 2.0 mg/dL    Nitrite Urine Negative NEG^Negative    Leukocyte Esterase Urine Negative NEG^Negative    Source Midstream Urine     RBC Urine <1 0 - 2 /HPF    WBC Urine 2  0 - 5 /HPF    Bacteria Urine None (A) NEG^Negative /HPF    Mucous Urine Present (A) NEG^Negative /LPF    Hyaline Casts 1 (A) OTO2^O - 2 /LPF   Procalcitonin   Result Value Ref Range    Procalcitonin 0.22 ng/ml   Lactic acid whole blood   Result Value Ref Range    Lactic Acid 1.8 0.7 - 2.0 mmol/L   Creatinine   Result Value Ref Range    Creatinine 0.91 0.66 - 1.25 mg/dL    GFR Estimate 82 >60 mL/min/[1.73_m2]    GFR Estimate If Black >90 >60 mL/min/[1.73_m2]   Glucose by meter   Result Value Ref Range    Glucose 85 70 - 99 mg/dL   Glucose by meter   Result Value Ref Range    Glucose 178 (H) 70 - 99 mg/dL   Basic metabolic panel   Result Value Ref Range    Sodium 141 133 - 144 mmol/L    Potassium 3.9 3.4 - 5.3 mmol/L    Chloride 107 94 - 109 mmol/L    Carbon Dioxide 29 20 - 32 mmol/L    Anion Gap 5 3 - 14 mmol/L    Glucose 89 70 - 99 mg/dL    Urea Nitrogen 21 7 - 30 mg/dL    Creatinine 1.04 0.66 - 1.25 mg/dL    GFR Estimate 69 >60 mL/min/[1.73_m2]    GFR Estimate If Black 80 >60 mL/min/[1.73_m2]    Calcium 7.7 (L) 8.5 - 10.1 mg/dL   CBC with platelets   Result Value Ref Range    WBC 17.5 (H) 4.0 - 11.0 10e9/L    RBC Count 3.76 (L) 4.4 - 5.9 10e12/L    Hemoglobin 11.3 (L) 13.3 - 17.7 g/dL    Hematocrit 34.8 (L) 40.0 - 53.0 %    MCV 93 78 - 100 fl    MCH 30.1 26.5 - 33.0 pg    MCHC 32.5 31.5 - 36.5 g/dL    RDW 13.7 10.0 - 15.0 %    Platelet Count 177 150 - 450 10e9/L   Glucose by meter   Result Value Ref Range    Glucose 124 (H) 70 - 99 mg/dL   Glucose by meter   Result Value Ref Range    Glucose 123 (H) 70 - 99 mg/dL   Lactic acid level STAT for sepsis protocol   Result Value Ref Range    Lactate for Sepsis Protocol 1.1 0.7 - 2.0 mmol/L   Glucose by meter   Result Value Ref Range    Glucose 118 (H) 70 - 99 mg/dL   Glucose by meter   Result Value Ref Range    Glucose 119 (H) 70 - 99 mg/dL   Vancomycin level   Result Value Ref Range    Vancomycin Level 7.8 mg/L   Glucose by meter   Result Value Ref Range    Glucose 103  (H) 70 - 99 mg/dL   CBC with platelets   Result Value Ref Range    WBC 15.3 (H) 4.0 - 11.0 10e9/L    RBC Count 3.54 (L) 4.4 - 5.9 10e12/L    Hemoglobin 10.8 (L) 13.3 - 17.7 g/dL    Hematocrit 32.8 (L) 40.0 - 53.0 %    MCV 93 78 - 100 fl    MCH 30.5 26.5 - 33.0 pg    MCHC 32.9 31.5 - 36.5 g/dL    RDW 13.9 10.0 - 15.0 %    Platelet Count 172 150 - 450 10e9/L   Basic metabolic panel   Result Value Ref Range    Sodium 140 133 - 144 mmol/L    Potassium 3.6 3.4 - 5.3 mmol/L    Chloride 108 94 - 109 mmol/L    Carbon Dioxide 27 20 - 32 mmol/L    Anion Gap 5 3 - 14 mmol/L    Glucose 66 (L) 70 - 99 mg/dL    Urea Nitrogen 19 7 - 30 mg/dL    Creatinine 1.12 0.66 - 1.25 mg/dL    GFR Estimate 63 >60 mL/min/[1.73_m2]    GFR Estimate If Black 73 >60 mL/min/[1.73_m2]    Calcium 7.8 (L) 8.5 - 10.1 mg/dL   Glucose by meter   Result Value Ref Range    Glucose 131 (H) 70 - 99 mg/dL   Glucose by meter   Result Value Ref Range    Glucose 119 (H) 70 - 99 mg/dL   Glucose by meter   Result Value Ref Range    Glucose 108 (H) 70 - 99 mg/dL   Lactic acid level STAT for sepsis protocol   Result Value Ref Range    Lactate for Sepsis Protocol 0.7 0.7 - 2.0 mmol/L   Glucose by meter   Result Value Ref Range    Glucose 84 70 - 99 mg/dL   CBC with platelets   Result Value Ref Range    WBC 14.1 (H) 4.0 - 11.0 10e9/L    RBC Count 3.78 (L) 4.4 - 5.9 10e12/L    Hemoglobin 11.4 (L) 13.3 - 17.7 g/dL    Hematocrit 34.8 (L) 40.0 - 53.0 %    MCV 92 78 - 100 fl    MCH 30.2 26.5 - 33.0 pg    MCHC 32.8 31.5 - 36.5 g/dL    RDW 13.8 10.0 - 15.0 %    Platelet Count 173 150 - 450 10e9/L   Basic metabolic panel   Result Value Ref Range    Sodium 143 133 - 144 mmol/L    Potassium 4.0 3.4 - 5.3 mmol/L    Chloride 109 94 - 109 mmol/L    Carbon Dioxide 25 20 - 32 mmol/L    Anion Gap 9 3 - 14 mmol/L    Glucose 100 (H) 70 - 99 mg/dL    Urea Nitrogen 16 7 - 30 mg/dL    Creatinine 0.96 0.66 - 1.25 mg/dL    GFR Estimate 76 >60 mL/min/[1.73_m2]    GFR Estimate If Black 88 >60  mL/min/[1.73_m2]    Calcium 7.9 (L) 8.5 - 10.1 mg/dL   Glucose by meter   Result Value Ref Range    Glucose 84 70 - 99 mg/dL   Glucose by meter   Result Value Ref Range    Glucose 77 70 - 99 mg/dL   Glucose by meter   Result Value Ref Range    Glucose 83 70 - 99 mg/dL   Glucose by meter   Result Value Ref Range    Glucose 94 70 - 99 mg/dL   Glucose by meter   Result Value Ref Range    Glucose 54 (L) 70 - 99 mg/dL   Glucose by meter   Result Value Ref Range    Glucose 92 70 - 99 mg/dL   Glucose by meter   Result Value Ref Range    Glucose 65 (L) 70 - 99 mg/dL   Glucose by meter   Result Value Ref Range    Glucose 99 70 - 99 mg/dL   Glucose by meter   Result Value Ref Range    Glucose 93 70 - 99 mg/dL   Glucose by meter   Result Value Ref Range    Glucose 105 (H) 70 - 99 mg/dL   Lactic acid level STAT for sepsis protocol   Result Value Ref Range    Lactate for Sepsis Protocol 0.7 0.7 - 2.0 mmol/L   Glucose by meter   Result Value Ref Range    Glucose 148 (H) 70 - 99 mg/dL   CBC with platelets   Result Value Ref Range    WBC 11.1 (H) 4.0 - 11.0 10e9/L    RBC Count 3.67 (L) 4.4 - 5.9 10e12/L    Hemoglobin 11.3 (L) 13.3 - 17.7 g/dL    Hematocrit 33.6 (L) 40.0 - 53.0 %    MCV 92 78 - 100 fl    MCH 30.8 26.5 - 33.0 pg    MCHC 33.6 31.5 - 36.5 g/dL    RDW 13.8 10.0 - 15.0 %    Platelet Count 202 150 - 450 10e9/L     *Note: Due to a large number of results and/or encounters for the requested time period, some results have not been displayed. A complete set of results can be found in Results Review.       ASSESSMENT / PLAN:  (S91.001D) Open wound of right ankle, subsequent encounter  (primary encounter diagnosis)  Comment: noted  Plan:   Will changed to iodosorb + foam    Patient will be going to [place] next week  Will see him the week after that    Duane has enough product to get him through  If not improving, might need to do a bx or xray    Treatment goal:  Moisture balance  Prevent infection and promote  healing      Patient Instructions   Wash hands prior to dressing change.   Clean instruments as appropriate    If old dressing sticks--just wet it with wound cleanser or water/soap   Wash wound with mild soap and water or wound cleanser, dry (okay to shower, don't soak wound)  Apply iodosorb to wound base  Cover with tegaderm heel foam  Change every 3-4 days or as needed    Please call if any questions/concerns and/or problems ([phone])    Follow up   One week    Three building blocks of wound healing  1. Protein (if not contraindicated)  2. Daily vitamin with vitamin C and zinc        Time: 35 minutes  Barrier: none  Willingness to learn: accepting    Dahiana KAYE FNP-BC  Disease Management    Cc: Dr. Melgar

## 2019-10-28 NOTE — PATIENT INSTRUCTIONS
Wash hands prior to dressing change.   Clean instruments as appropriate    If old dressing sticks--just wet it with wound cleanser or water/soap   Wash wound with mild soap and water or wound cleanser, dry (okay to shower, don't soak wound)  Apply iodosorb to wound base  Cover with tegaderm heel foam  Change every 3-4 days or as needed    Please call if any questions/concerns and/or problems ([phone])    Follow up   One week    Three building blocks of wound healing  1. Protein (if not contraindicated)  2. Daily vitamin with vitamin C and zinc

## 2019-10-30 ENCOUNTER — OFFICE VISIT (OUTPATIENT)
Dept: WOUND CARE | Facility: OTHER | Age: 74
End: 2019-10-30
Attending: NURSE PRACTITIONER
Payer: COMMERCIAL

## 2019-10-30 VITALS
SYSTOLIC BLOOD PRESSURE: 143 MMHG | RESPIRATION RATE: 16 BRPM | DIASTOLIC BLOOD PRESSURE: 76 MMHG | TEMPERATURE: 97.8 F | HEART RATE: 90 BPM

## 2019-10-30 DIAGNOSIS — S91.001D OPEN WOUND OF RIGHT ANKLE, SUBSEQUENT ENCOUNTER: Primary | ICD-10-CM

## 2019-10-30 PROCEDURE — G0463 HOSPITAL OUTPT CLINIC VISIT: HCPCS | Mod: 25

## 2019-10-30 PROCEDURE — 29581 APPL MULTLAYER CMPRN SYS LEG: CPT | Performed by: NURSE PRACTITIONER

## 2019-10-30 PROCEDURE — 99213 OFFICE O/P EST LOW 20 MIN: CPT | Mod: 25 | Performed by: NURSE PRACTITIONER

## 2019-10-30 ASSESSMENT — PAIN SCALES - GENERAL: PAINLEVEL: NO PAIN (0)

## 2019-10-30 NOTE — NURSING NOTE
"Chief Complaint   Patient presents with     WOUND CARE       Initial BP (!) 143/76 (BP Location: Left arm, Patient Position: Sitting, Cuff Size: Adult Regular)   Pulse 90   Temp 97.8  F (36.6  C) (Tympanic)   Resp 16  Estimated body mass index is 37.36 kg/m  as calculated from the following:    Height as of 10/16/19: 1.753 m (5' 9\").    Weight as of 10/16/19: 114.8 kg (253 lb).  Medication Reconciliation: complete  Patricia Munson LPN  "

## 2019-10-30 NOTE — PATIENT INSTRUCTIONS
Keep wrap dry.    If wrap gets wet, remove it and call [facility]  If wrap slips down, remove it and call [facility]ound Care Center  If wrap is too tight, snip the top of the wrap and elevate.  If this doesn't improve, remove wrap and call Wound Care Center    Elevate legs as much as possible.   Monitor salt/sodium intake.      Call if any questions/concerns and/or problems develop ([phone])    Follow up   Monday

## 2019-10-30 NOTE — PROGRESS NOTES
SUBJECTIVE:  Duane E Arola, 74 year old, male presents with the following Chief Complaint(s) with HPI to follow:  Chief Complaint   Patient presents with     WOUND CARE        Wound Care    HPI:  Duane is here today for the reassessment and treatment of right, medial ankle wound.  Back story (hard to get a good):  Sounds like this wound has been off an on for awhile.   Started >1 year ago.    Recently hospitalized from 9/6/19 to 9/10/19 for RLE cellulitis. He was seen with similar presentation about 1 year ago back in June 2018.    Sent home on Keflex  10/2/19: follow up appointment with Dr. Melgar.  Wound culture at that time: positive for serratia marcescens.  Keflex was changed to Bactrim BID.  Referral to wound care at this time.   10/9/19: 1st wound care with myself. Tx: silver hydrofiber, tegaderm heel foam  10/16/19: saw myself. Tx: iodosorb, foam  Duane was up in [employer], so he returned today--  10/30/19: seeing myself today     Home tx: antibiotic ointment + gauze  Past tx: silver hydrofiber + foam  Current tx: iodosorb, foam, change  No issues with dressing changes   Reports pain continues to be better  Occasionally, has nerve like pain  Denies any fevers, chills, and/or malodorous drainage.   PMH: venous stasis disease, Type 2 diabetes, denies hx of smoking  Last A1c  Lab Results   Component Value Date    A1C 6.9 06/09/2018    A1C 7.7 01/16/2018    A1C 9.3 10/04/2017    A1C 8.3 03/15/2017    A1C 8.0 12/08/2016       Patient Active Problem List   Diagnosis     Fibula fracture     Cellulitis of leg     Diabetes mellitus, type 2 (H)     Cellulitis     ACP (advance care planning)     Benign essential hypertension       History reviewed. No pertinent past medical history.    Past Surgical History:   Procedure Laterality Date     COLONOSCOPY N/A 1/26/2018    Procedure: COLONOSCOPY;  COLONOSCOPY;  Surgeon: Arnel Childers MD;  Location: [facility]     COLONOSCOPY - HIM SCAN  06/2012    [facility]     COLONOSCOPY - HIM  SCAN  06/01/2006    Colonoscopy, Dr Marquez, [facility] 6/2006     PHACOEMULSIFICATION WITH STANDARD INTRAOCULAR LENS IMPLANT Right 3/22/2016    Procedure: PHACOEMULSIFICATION WITH STANDARD INTRAOCULAR LENS IMPLANT;  Surgeon: Ramses Mcnulty MD;  Location: [facility]     PHACOEMULSIFICATION WITH STANDARD INTRAOCULAR LENS IMPLANT Left 11/2/2017    Procedure: PHACOEMULSIFICATION WITH STANDARD INTRAOCULAR LENS IMPLANT;  CATARACT EXTRACTION LEFT EYE WITH IMPLANT;  Surgeon: Severiaon Martin MD;  Location: [facility] OR       History reviewed. No pertinent family history.    Social History     Tobacco Use     Smoking status: Never Smoker     Smokeless tobacco: Never Used   Substance Use Topics     Alcohol use: No       Current Outpatient Medications   Medication Sig Dispense Refill     acetaminophen (TYLENOL) 325 MG tablet Take 2 tablets (650 mg) by mouth every 6 hours as needed for mild pain 100 tablet 0     ASPIRIN PO Take 162 mg by mouth daily        Cranberry 1000 MG CAPS        garlic 150 MG TABS Take 150 mg by mouth daily.       hypromellose (ARTIFICIAL TEARS) 0.5 % SOLN ophthalmic solution 1 drop every hour as needed for dry eyes       insulin degludec (TRESIBA FLEXTOUCH) 200 UNIT/ML pen Inject 110 Units Subcutaneous daily (Patient taking differently: Inject 80 Units Subcutaneous every morning ) 15 mL 3     insulin pen needle (BD CK U/F) 32G X 4 MM Use 2 pen needles daily. 200 each 6     insulin pen needle (TOPCARE CLICKFINE PEN NEEDLES) 31G X 6 MM Use pen needles daily or as directed. 100 each 12     Multiple Vitamins-Minerals (CENTRUM SILVER) per tablet Take 1 tablet by mouth daily.       order for DME Equipment being ordered:     1.  Tegaderm heel foam  Apply to affected area every 3 days  Disp: 5 ech    Wound size: 0.3 x 04 cm; 1 x 0.9 x 0.3 cm  Refill: 3 5 each 3     Pioglitazone HCl-Glimepiride 30-2 MG TABS Take 1 tablet by mouth daily       SIMVASTATIN PO Take 20 mg by mouth daily         Allergies   Allergen Reactions      Metformin      Legs get weak     Seafood Hives       REVIEW OF SYSTEMS  Skin: as noted above  Eyes: positive for history of cataracts (right eye)  Ears/Nose/Throat: negative  Respiratory: Cough- gone, No shortness of breath, No dyspnea on exertion and No hemoptysis  Cardiovascular: negative  Gastrointestinal: negative  Genitourinary: negative  Musculoskeletal: history of arthritis; history of right shoulder surgery  Neurologic: RLE--occasional nerve pain   Psychiatric: negative  Hematologic/Lymphatic/Immunologic: negative  Endocrine: positive for diabetes       OBJECTIVE:  BP (!) 143/76 (BP Location: Left arm, Patient Position: Sitting, Cuff Size: Adult Regular)   Pulse 90   Temp 97.8  F (36.6  C) (Tympanic)   Resp 16   Constitutional: alert and no distress  Musculoskeletal: extremities normal- no gross deformities noted  Skin:   Wound description: Type of Wound- venous stasis; Location- medial ankle, Drainage amount-scant, Drainage color- iodosorb stained, Odor- none; Wound bed- 90% dark red/ 10% tan, Surrounding skin- +2 pitting edema, no heat, no erythema, no lymphangitis, hemosiderin staining, peeling skin.  Measurements:   Proximal: 0.6 x 0.5 x 0.1 cm  Distal: healed  Dressing change: Wound cleansed with soap and water, dried, dressed with moistened collagen +ag, hydrofiber, 2 layer compression wrap--able to place finger between leg and wrap.     Psychiatric: mentation appears normal and affect normal/bright    LABS  Results for orders placed or performed during the hospital encounter of 09/06/19   Comprehensive metabolic panel   Result Value Ref Range    Sodium 140 133 - 144 mmol/L    Potassium 4.1 3.4 - 5.3 mmol/L    Chloride 104 94 - 109 mmol/L    Carbon Dioxide 29 20 - 32 mmol/L    Anion Gap 7 3 - 14 mmol/L    Glucose 126 (H) 70 - 99 mg/dL    Urea Nitrogen 22 7 - 30 mg/dL    Creatinine 0.98 0.66 - 1.25 mg/dL    GFR Estimate 75 >60 mL/min/[1.73_m2]    GFR Estimate If Black 87 >60 mL/min/[1.73_m2]     Calcium 8.9 8.5 - 10.1 mg/dL    Bilirubin Total 0.6 0.2 - 1.3 mg/dL    Albumin 4.0 3.4 - 5.0 g/dL    Protein Total 8.2 6.8 - 8.8 g/dL    Alkaline Phosphatase 60 40 - 150 U/L    ALT 38 0 - 70 U/L    AST 26 0 - 45 U/L   CBC with platelets differential   Result Value Ref Range    WBC 16.1 (H) 4.0 - 11.0 10e9/L    RBC Count 4.54 4.4 - 5.9 10e12/L    Hemoglobin 13.7 13.3 - 17.7 g/dL    Hematocrit 40.9 40.0 - 53.0 %    MCV 90 78 - 100 fl    MCH 30.2 26.5 - 33.0 pg    MCHC 33.5 31.5 - 36.5 g/dL    RDW 13.4 10.0 - 15.0 %    Platelet Count 236 150 - 450 10e9/L    Diff Method Automated Method     % Neutrophils 84.3 %    % Lymphocytes 5.4 %    % Monocytes 8.8 %    % Eosinophils 0.5 %    % Basophils 0.4 %    % Immature Granulocytes 0.6 %    Nucleated RBCs 0 0 /100    Absolute Neutrophil 13.6 (H) 1.6 - 8.3 10e9/L    Absolute Lymphocytes 0.9 0.8 - 5.3 10e9/L    Absolute Monocytes 1.4 (H) 0.0 - 1.3 10e9/L    Absolute Eosinophils 0.1 0.0 - 0.7 10e9/L    Absolute Basophils 0.1 0.0 - 0.2 10e9/L    Abs Immature Granulocytes 0.1 0 - 0.4 10e9/L    Absolute Nucleated RBC 0.0    Lactate for Sepsis Protocol   Result Value Ref Range    Lactate for Sepsis Protocol 2.3 (H) 0.7 - 2.0 mmol/L   UA reflex to Microscopic   Result Value Ref Range    Color Urine Yellow     Appearance Urine Clear     Glucose Urine Negative NEG^Negative mg/dL    Bilirubin Urine Negative NEG^Negative    Ketones Urine Negative NEG^Negative mg/dL    Specific Gravity Urine 1.022 1.003 - 1.035    Blood Urine Negative NEG^Negative    pH Urine 8.0 4.7 - 8.0 pH    Protein Albumin Urine 10 (A) NEG^Negative mg/dL    Urobilinogen mg/dL Normal 0.0 - 2.0 mg/dL    Nitrite Urine Negative NEG^Negative    Leukocyte Esterase Urine Negative NEG^Negative    Source Midstream Urine     RBC Urine <1 0 - 2 /HPF    WBC Urine 2 0 - 5 /HPF    Bacteria Urine None (A) NEG^Negative /HPF    Mucous Urine Present (A) NEG^Negative /LPF    Hyaline Casts 1 (A) OTO2^O - 2 /LPF   Procalcitonin   Result  Value Ref Range    Procalcitonin 0.22 ng/ml   Lactic acid whole blood   Result Value Ref Range    Lactic Acid 1.8 0.7 - 2.0 mmol/L   Creatinine   Result Value Ref Range    Creatinine 0.91 0.66 - 1.25 mg/dL    GFR Estimate 82 >60 mL/min/[1.73_m2]    GFR Estimate If Black >90 >60 mL/min/[1.73_m2]   Glucose by meter   Result Value Ref Range    Glucose 85 70 - 99 mg/dL   Glucose by meter   Result Value Ref Range    Glucose 178 (H) 70 - 99 mg/dL   Basic metabolic panel   Result Value Ref Range    Sodium 141 133 - 144 mmol/L    Potassium 3.9 3.4 - 5.3 mmol/L    Chloride 107 94 - 109 mmol/L    Carbon Dioxide 29 20 - 32 mmol/L    Anion Gap 5 3 - 14 mmol/L    Glucose 89 70 - 99 mg/dL    Urea Nitrogen 21 7 - 30 mg/dL    Creatinine 1.04 0.66 - 1.25 mg/dL    GFR Estimate 69 >60 mL/min/[1.73_m2]    GFR Estimate If Black 80 >60 mL/min/[1.73_m2]    Calcium 7.7 (L) 8.5 - 10.1 mg/dL   CBC with platelets   Result Value Ref Range    WBC 17.5 (H) 4.0 - 11.0 10e9/L    RBC Count 3.76 (L) 4.4 - 5.9 10e12/L    Hemoglobin 11.3 (L) 13.3 - 17.7 g/dL    Hematocrit 34.8 (L) 40.0 - 53.0 %    MCV 93 78 - 100 fl    MCH 30.1 26.5 - 33.0 pg    MCHC 32.5 31.5 - 36.5 g/dL    RDW 13.7 10.0 - 15.0 %    Platelet Count 177 150 - 450 10e9/L   Glucose by meter   Result Value Ref Range    Glucose 124 (H) 70 - 99 mg/dL   Glucose by meter   Result Value Ref Range    Glucose 123 (H) 70 - 99 mg/dL   Lactic acid level STAT for sepsis protocol   Result Value Ref Range    Lactate for Sepsis Protocol 1.1 0.7 - 2.0 mmol/L   Glucose by meter   Result Value Ref Range    Glucose 118 (H) 70 - 99 mg/dL   Glucose by meter   Result Value Ref Range    Glucose 119 (H) 70 - 99 mg/dL   Vancomycin level   Result Value Ref Range    Vancomycin Level 7.8 mg/L   Glucose by meter   Result Value Ref Range    Glucose 103 (H) 70 - 99 mg/dL   CBC with platelets   Result Value Ref Range    WBC 15.3 (H) 4.0 - 11.0 10e9/L    RBC Count 3.54 (L) 4.4 - 5.9 10e12/L    Hemoglobin 10.8 (L) 13.3 -  17.7 g/dL    Hematocrit 32.8 (L) 40.0 - 53.0 %    MCV 93 78 - 100 fl    MCH 30.5 26.5 - 33.0 pg    MCHC 32.9 31.5 - 36.5 g/dL    RDW 13.9 10.0 - 15.0 %    Platelet Count 172 150 - 450 10e9/L   Basic metabolic panel   Result Value Ref Range    Sodium 140 133 - 144 mmol/L    Potassium 3.6 3.4 - 5.3 mmol/L    Chloride 108 94 - 109 mmol/L    Carbon Dioxide 27 20 - 32 mmol/L    Anion Gap 5 3 - 14 mmol/L    Glucose 66 (L) 70 - 99 mg/dL    Urea Nitrogen 19 7 - 30 mg/dL    Creatinine 1.12 0.66 - 1.25 mg/dL    GFR Estimate 63 >60 mL/min/[1.73_m2]    GFR Estimate If Black 73 >60 mL/min/[1.73_m2]    Calcium 7.8 (L) 8.5 - 10.1 mg/dL   Glucose by meter   Result Value Ref Range    Glucose 131 (H) 70 - 99 mg/dL   Glucose by meter   Result Value Ref Range    Glucose 119 (H) 70 - 99 mg/dL   Glucose by meter   Result Value Ref Range    Glucose 108 (H) 70 - 99 mg/dL   Lactic acid level STAT for sepsis protocol   Result Value Ref Range    Lactate for Sepsis Protocol 0.7 0.7 - 2.0 mmol/L   Glucose by meter   Result Value Ref Range    Glucose 84 70 - 99 mg/dL   CBC with platelets   Result Value Ref Range    WBC 14.1 (H) 4.0 - 11.0 10e9/L    RBC Count 3.78 (L) 4.4 - 5.9 10e12/L    Hemoglobin 11.4 (L) 13.3 - 17.7 g/dL    Hematocrit 34.8 (L) 40.0 - 53.0 %    MCV 92 78 - 100 fl    MCH 30.2 26.5 - 33.0 pg    MCHC 32.8 31.5 - 36.5 g/dL    RDW 13.8 10.0 - 15.0 %    Platelet Count 173 150 - 450 10e9/L   Basic metabolic panel   Result Value Ref Range    Sodium 143 133 - 144 mmol/L    Potassium 4.0 3.4 - 5.3 mmol/L    Chloride 109 94 - 109 mmol/L    Carbon Dioxide 25 20 - 32 mmol/L    Anion Gap 9 3 - 14 mmol/L    Glucose 100 (H) 70 - 99 mg/dL    Urea Nitrogen 16 7 - 30 mg/dL    Creatinine 0.96 0.66 - 1.25 mg/dL    GFR Estimate 76 >60 mL/min/[1.73_m2]    GFR Estimate If Black 88 >60 mL/min/[1.73_m2]    Calcium 7.9 (L) 8.5 - 10.1 mg/dL   Glucose by meter   Result Value Ref Range    Glucose 84 70 - 99 mg/dL   Glucose by meter   Result Value Ref Range     Glucose 77 70 - 99 mg/dL   Glucose by meter   Result Value Ref Range    Glucose 83 70 - 99 mg/dL   Glucose by meter   Result Value Ref Range    Glucose 94 70 - 99 mg/dL   Glucose by meter   Result Value Ref Range    Glucose 54 (L) 70 - 99 mg/dL   Glucose by meter   Result Value Ref Range    Glucose 92 70 - 99 mg/dL   Glucose by meter   Result Value Ref Range    Glucose 65 (L) 70 - 99 mg/dL   Glucose by meter   Result Value Ref Range    Glucose 99 70 - 99 mg/dL   Glucose by meter   Result Value Ref Range    Glucose 93 70 - 99 mg/dL   Glucose by meter   Result Value Ref Range    Glucose 105 (H) 70 - 99 mg/dL   Lactic acid level STAT for sepsis protocol   Result Value Ref Range    Lactate for Sepsis Protocol 0.7 0.7 - 2.0 mmol/L   Glucose by meter   Result Value Ref Range    Glucose 148 (H) 70 - 99 mg/dL   CBC with platelets   Result Value Ref Range    WBC 11.1 (H) 4.0 - 11.0 10e9/L    RBC Count 3.67 (L) 4.4 - 5.9 10e12/L    Hemoglobin 11.3 (L) 13.3 - 17.7 g/dL    Hematocrit 33.6 (L) 40.0 - 53.0 %    MCV 92 78 - 100 fl    MCH 30.8 26.5 - 33.0 pg    MCHC 33.6 31.5 - 36.5 g/dL    RDW 13.8 10.0 - 15.0 %    Platelet Count 202 150 - 450 10e9/L     *Note: Due to a large number of results and/or encounters for the requested time period, some results have not been displayed. A complete set of results can be found in Results Review.       ASSESSMENT / PLAN:  (S91.001D) Open wound of right ankle, subsequent encounter  (primary encounter diagnosis)  Comment: noted and dimensions better  Plan:   Increase edema today.    Will trial a compression wrap--2 layer  Moisten collagen + ag and hydrofiber (in case it starts draining more with the squeeze)    Follow up Monday    We talked about seeing something regarding venous ablation.  He's a Dr. Melgar patient.  Encouraged him to talk to Dr. Melgar about a referral with Dr. Cochran    Treatment goal:  Moisture balance  Prevent infection and promote healing      Patient Instructions   Keep  wrap dry.    If wrap gets wet, remove it and call [facility] Care [facility]  If wrap slips down, remove it and call Wound Care Center  If wrap is too tight, snip the top of the wrap and elevate.  If this doesn't improve, remove wrap and call Wound Care Center    Elevate legs as much as possible.   Monitor salt/sodium intake.      Call if any questions/concerns and/or problems develop ([phone])    Follow up   Monday          Time: 3 minutes  Barrier: none  Willingness to learn: accepting    Dahiana KAYE FNP-BC  Disease Management    Cc: Dr. Melgar

## 2019-11-04 ENCOUNTER — HOSPITAL ENCOUNTER (OUTPATIENT)
Dept: WOUND CARE | Facility: HOSPITAL | Age: 74
Discharge: HOME | End: 2019-11-04
Attending: NURSE PRACTITIONER | Admitting: NURSE PRACTITIONER
Payer: COMMERCIAL

## 2019-11-04 VITALS — HEART RATE: 93 BPM | DIASTOLIC BLOOD PRESSURE: 67 MMHG | SYSTOLIC BLOOD PRESSURE: 127 MMHG | TEMPERATURE: 98.4 F

## 2019-11-04 PROCEDURE — 29581 APPL MULTLAYER CMPRN SYS LEG: CPT

## 2019-11-04 NOTE — PROGRESS NOTES
Duane E Arola, 1945  Chief Complaint   Patient presents with     WOUND CARE       Patient Active Problem List   Diagnosis     Fibula fracture     Cellulitis of leg     Diabetes mellitus, type 2 (H)     Cellulitis     ACP (advance care planning)     Benign essential hypertension       Concerns/Comments:   Duane E Arola is here for re-evaluation and tx of RLE venous stasis ulceration present on and off for over a year.  He tolerated the compression wrap placed at last visit. He does remember trying to use compression hose in the past but does not feel he would be able to apply them. He is very surprised at how much smaller his R leg is after the compression wrap tx compared to his LLE and is willing to try using Juxta lite wraps.  Tanvi Gee was kind enough to show him the system and measure him at today's visit.    Objective:   Location:   R medial ankle  Drainage:   none    Odor:   none  Measurement:   0.4x0.3cm  Edges:   attached  Base:   red  Surrounding Skin:   hemosiderin staining    Procedures:   Wound bed and leg cleansed and evaluated.  Moisturized with Atrac-tain lotion.  Tanvi Gee (Orthotist) in to evaluate LE for compression.  NS moist collagen to wound.  2 layer CoFlex TLC wrap applied.    Assessment/Response to tx:  Wound measures smaller.  Edema well controlled.    Plan of care:   Possibly transition into Juxta lite compression at his Friday visit. The Juxta is in my office.  Continue compression wrap tx with collagen.    Treatment Goal:  Epithelialization.  Prevention of re-ulceration    Supplies provided:  NA    Education:  Wound care instructions/education provided. Patient verbalized understanding of instruction/education.    CC: Donavan Melgar

## 2019-11-08 ENCOUNTER — OFFICE VISIT (OUTPATIENT)
Dept: WOUND CARE | Facility: OTHER | Age: 74
End: 2019-11-08
Attending: NURSE PRACTITIONER
Payer: COMMERCIAL

## 2019-11-08 VITALS
DIASTOLIC BLOOD PRESSURE: 69 MMHG | SYSTOLIC BLOOD PRESSURE: 128 MMHG | HEART RATE: 90 BPM | RESPIRATION RATE: 16 BRPM | TEMPERATURE: 97.6 F

## 2019-11-08 DIAGNOSIS — S91.001D OPEN WOUND OF RIGHT ANKLE, SUBSEQUENT ENCOUNTER: Primary | ICD-10-CM

## 2019-11-08 PROCEDURE — G0463 HOSPITAL OUTPT CLINIC VISIT: HCPCS

## 2019-11-08 PROCEDURE — G0463 HOSPITAL OUTPT CLINIC VISIT: HCPCS | Mod: 25

## 2019-11-08 PROCEDURE — 99213 OFFICE O/P EST LOW 20 MIN: CPT | Performed by: NURSE PRACTITIONER

## 2019-11-08 ASSESSMENT — PAIN SCALES - GENERAL: PAINLEVEL: NO PAIN (0)

## 2019-11-08 NOTE — PROGRESS NOTES
SUBJECTIVE:  Duane E Arola, 74 year old, male presents with the following Chief Complaint(s) with HPI to follow:  Chief Complaint   Patient presents with     WOUND CARE        Wound Care    HPI:  Duane is here today for the reassessment and treatment of right, medial ankle wound.  Back story (hard to get a good):  Sounds like this wound has been off an on for awhile.   Started >1 year ago.    Recently hospitalized from 9/6/19 to 9/10/19 for RLE cellulitis. He was seen with similar presentation about 1 year ago back in June 2018.    Sent home on Keflex  10/2/19: follow up appointment with Dr. Melgar.  Wound culture at that time: positive for serratia marcescens.  Keflex was changed to Bactrim BID.  Referral to wound care at this time.   10/9/19: 1st wound care with myself. Tx: silver hydrofiber, tegaderm heel foam  10/16/19: saw myself. Tx: iodosorb, foam  Duane was up in [employer], so he returned today--  10/30/19: saw myself   11/4/19: saw Michaelle TOLENTINO RN [facility]. Tx: collagen + 2 layer wrap applied.  Tanvi Gee here to evaluate for compression  11/8/19: here today to see myself      Home tx: antibiotic ointment + gauze  Past tx: silver hydrofiber + foam; iodosorb, foam, change  Current tx: collagen + 2 layer wrap  No issues with the compression wrap   Denies any fevers, chills, and/or malodorous drainage.   PMH: venous stasis disease, Type 2 diabetes, denies hx of smoking  Last A1c  Lab Results   Component Value Date    A1C 6.9 06/09/2018    A1C 7.7 01/16/2018    A1C 9.3 10/04/2017    A1C 8.3 03/15/2017    A1C 8.0 12/08/2016       Patient Active Problem List   Diagnosis     Fibula fracture     Cellulitis of leg     Diabetes mellitus, type 2 (H)     Cellulitis     ACP (advance care planning)     Benign essential hypertension       History reviewed. No pertinent past medical history.    Past Surgical History:   Procedure Laterality Date     COLONOSCOPY N/A 1/26/2018    Procedure: COLONOSCOPY;  COLONOSCOPY;  Surgeon:  Arnel Childers MD;  Location: [facility]     COLONOSCOPY - HIM SCAN  06/2012    [facility]     COLONOSCOPY - HIM SCAN  06/01/2006    Colonoscopy, Dr Marquez, [facility] 6/2006     PHACOEMULSIFICATION WITH STANDARD INTRAOCULAR LENS IMPLANT Right 3/22/2016    Procedure: PHACOEMULSIFICATION WITH STANDARD INTRAOCULAR LENS IMPLANT;  Surgeon: Ramses Mcnulty MD;  Location: [facility]     PHACOEMULSIFICATION WITH STANDARD INTRAOCULAR LENS IMPLANT Left 11/2/2017    Procedure: PHACOEMULSIFICATION WITH STANDARD INTRAOCULAR LENS IMPLANT;  CATARACT EXTRACTION LEFT EYE WITH IMPLANT;  Surgeon: Severiano Martin MD;  Location: [facility] OR       History reviewed. No pertinent family history.    Social History     Tobacco Use     Smoking status: Never Smoker     Smokeless tobacco: Never Used   Substance Use Topics     Alcohol use: No       Current Outpatient Medications   Medication Sig Dispense Refill     acetaminophen (TYLENOL) 325 MG tablet Take 2 tablets (650 mg) by mouth every 6 hours as needed for mild pain 100 tablet 0     ASPIRIN PO Take 162 mg by mouth daily        Cranberry 1000 MG CAPS        garlic 150 MG TABS Take 150 mg by mouth daily.       hypromellose (ARTIFICIAL TEARS) 0.5 % SOLN ophthalmic solution 1 drop every hour as needed for dry eyes       insulin degludec (TRESIBA FLEXTOUCH) 200 UNIT/ML pen Inject 110 Units Subcutaneous daily (Patient taking differently: Inject 95 Units Subcutaneous every morning ) 15 mL 3     insulin pen needle (BD CK U/F) 32G X 4 MM Use 2 pen needles daily. 200 each 6     Multiple Vitamins-Minerals (CENTRUM SILVER) per tablet Take 1 tablet by mouth daily.       order for DME Equipment being ordered:     1.  Tegaderm heel foam  Apply to affected area every 3 days  Disp: 5 ech    Wound size: 0.3 x 04 cm; 1 x 0.9 x 0.3 cm  Refill: 3 5 each 3     Pioglitazone HCl-Glimepiride 30-2 MG TABS Take 1 tablet by mouth daily       SIMVASTATIN PO Take 20 mg by mouth daily         Allergies   Allergen Reactions     Metformin       Legs get weak     Seafood Hives       REVIEW OF SYSTEMS  Skin: as noted above  Eyes: positive for history of cataracts (right eye)  Ears/Nose/Throat: negative  Respiratory: Cough- gone, No shortness of breath, No dyspnea on exertion and No hemoptysis  Cardiovascular: negative  Gastrointestinal: negative  Genitourinary: negative  Musculoskeletal: history of arthritis; history of right shoulder surgery  Neurologic: RLE--occasional nerve pain   Psychiatric: negative  Hematologic/Lymphatic/Immunologic: negative  Endocrine: positive for diabetes       OBJECTIVE:  /69 (BP Location: Left arm, Patient Position: Sitting, Cuff Size: Adult Regular)   Pulse 90   Temp 97.6  F (36.4  C) (Tympanic)   Resp 16   Constitutional: alert and no distress  Musculoskeletal: extremities normal- no gross deformities noted  Skin:   Wound description: Type of Wound- venous stasis; Location- medial ankle, Drainage amount-none, Drainage color- n/a; Odor- none; Wound bed- 100% healed, Surrounding skin- no edema, no heat, no erythema, no lymphangitis, hemosiderin staining, dry skin.  Measurements:   Proximal; healed  Distal: healed  Dressing change: Wound cleansed with soap and water, dried, applied vanicream and new compression wrap (Juxalite)     Psychiatric: mentation appears normal and affect normal/bright    LABS  No results found for any visits on 11/08/19.    ASSESSMENT / PLAN:  (S91.001D) Open wound of right ankle, subsequent encounter  (primary encounter diagnosis)  Comment: healed  Plan:   Transitioned into daily moisturizer and compression wrap/sock as directed    Duane states he will be talking to Dr. Melgar about seeing the vascular specialist (re: vein ablation)     Follow up as needed    Treatment goal:  Daily moisturizing  Daily compression wrap/sock  Prevent wound from reopening      Patient Instructions   Wash wound with mild soap and water, dry  Apply non-scented moisturizer (Gold Bond Diabetic lotion is great)  Wear your  compression wrap daily as directed--on in the morning, off at night      Please call if any questions/concerns and/or problems.  OR if another wound develops or this one reopens ([phone])    Follow up   As needed          Time: 25 minutes  Barrier: none  Willingness to learn: accepting    Dahiana KAYE FNP-BC  Disease Management    Cc: Dr. Melgar

## 2019-11-08 NOTE — NURSING NOTE
"Chief Complaint   Patient presents with     WOUND CARE       Initial /69 (BP Location: Left arm, Patient Position: Sitting, Cuff Size: Adult Regular)   Pulse 90   Temp 97.6  F (36.4  C) (Tympanic)   Resp 16  Estimated body mass index is 37.36 kg/m  as calculated from the following:    Height as of 10/16/19: 1.753 m (5' 9\").    Weight as of 10/16/19: 114.8 kg (253 lb).  Medication Reconciliation: complete  Patricia Munson LPN  "

## 2019-11-08 NOTE — PATIENT INSTRUCTIONS
Wash wound with mild soap and water, dry  Apply non-scented moisturizer (Gold Bond Diabetic lotion is great)  Wear your compression wrap daily as directed--on in the morning, off at night      Please call if any questions/concerns and/or problems.  OR if another wound develops or this one reopens ([phone])    Follow up   As needed

## 2020-03-02 ENCOUNTER — HEALTH MAINTENANCE LETTER (OUTPATIENT)
Age: 75
End: 2020-03-02

## 2020-12-20 ENCOUNTER — HEALTH MAINTENANCE LETTER (OUTPATIENT)
Age: 75
End: 2020-12-20

## 2021-04-18 ENCOUNTER — HEALTH MAINTENANCE LETTER (OUTPATIENT)
Age: 76
End: 2021-04-18

## 2021-08-08 ENCOUNTER — HEALTH MAINTENANCE LETTER (OUTPATIENT)
Age: 76
End: 2021-08-08

## 2021-10-03 ENCOUNTER — HEALTH MAINTENANCE LETTER (OUTPATIENT)
Age: 76
End: 2021-10-03

## 2022-03-19 ENCOUNTER — HEALTH MAINTENANCE LETTER (OUTPATIENT)
Age: 77
End: 2022-03-19

## 2022-05-14 ENCOUNTER — HEALTH MAINTENANCE LETTER (OUTPATIENT)
Age: 77
End: 2022-05-14

## 2022-09-04 ENCOUNTER — HEALTH MAINTENANCE LETTER (OUTPATIENT)
Age: 77
End: 2022-09-04

## 2023-01-15 ENCOUNTER — HEALTH MAINTENANCE LETTER (OUTPATIENT)
Age: 78
End: 2023-01-15

## 2023-07-23 ENCOUNTER — HEALTH MAINTENANCE LETTER (OUTPATIENT)
Age: 78
End: 2023-07-23

## 2024-02-18 ENCOUNTER — HEALTH MAINTENANCE LETTER (OUTPATIENT)
Age: 79
End: 2024-02-18

## (undated) DEVICE — TUBING-SUCTION 20FT

## (undated) DEVICE — PACK-EYE-CUSTOM

## (undated) DEVICE — GLV-7.5 PROTEXIS PI CLASSIC LF/PF

## (undated) DEVICE — KNIFE-MVR 20GA/45 DEGREE ANGLED

## (undated) DEVICE — KNIFE-LASEREDGE CLEAR CORNEAL 2.75MM ANGLED DOUBLE BEVEL

## (undated) DEVICE — INSTRUMENT WIPE-VISIWIPE

## (undated) DEVICE — PACK-PHACO STELLARIS

## (undated) DEVICE — BIN-LENS IMPLANT CART

## (undated) DEVICE — BIN-CATARACT BIN

## (undated) DEVICE — IRRIGATION-H2O 1000ML

## (undated) DEVICE — GLV-7.0 PROTEXIS PI CLASSIC LF/PF

## (undated) DEVICE — CYSTOTOME-IRRIGATING  25G

## (undated) DEVICE — CANISTER-SUCTION 2000CC

## (undated) DEVICE — LUBRICANT JELLY 2OZ. TUBE

## (undated) DEVICE — BIN-TECNIS DCB00 LENSES

## (undated) DEVICE — HANDPIECE-CAPSULEGUARD I/A STELLARIS

## (undated) DEVICE — BETADINE 5% STERILE OPHTHALMIC SOLUTION 1 OZ.

## (undated) DEVICE — DRSG-TEGADERM MEDIUM #1626

## (undated) RX ORDER — PROPOFOL 10 MG/ML
INJECTION, EMULSION INTRAVENOUS
Status: DISPENSED
Start: 2018-01-26